# Patient Record
Sex: MALE | Race: BLACK OR AFRICAN AMERICAN | Employment: FULL TIME | URBAN - METROPOLITAN AREA
[De-identification: names, ages, dates, MRNs, and addresses within clinical notes are randomized per-mention and may not be internally consistent; named-entity substitution may affect disease eponyms.]

---

## 2017-01-01 ENCOUNTER — HOSPITAL ENCOUNTER (INPATIENT)
Age: 67
LOS: 9 days | Discharge: HOSPICE | DRG: 308 | End: 2017-07-28
Attending: EMERGENCY MEDICINE | Admitting: HOSPITALIST
Payer: MEDICARE

## 2017-01-01 ENCOUNTER — HOSPICE ADMISSION (OUTPATIENT)
Dept: HOSPICE | Facility: HOSPICE | Age: 67
End: 2017-01-01

## 2017-01-01 ENCOUNTER — APPOINTMENT (OUTPATIENT)
Dept: GENERAL RADIOLOGY | Age: 67
DRG: 308 | End: 2017-01-01
Attending: INTERNAL MEDICINE
Payer: MEDICARE

## 2017-01-01 ENCOUNTER — APPOINTMENT (OUTPATIENT)
Dept: GENERAL RADIOLOGY | Age: 67
DRG: 308 | End: 2017-01-01
Attending: PHYSICIAN ASSISTANT
Payer: MEDICARE

## 2017-01-01 ENCOUNTER — APPOINTMENT (OUTPATIENT)
Dept: GENERAL RADIOLOGY | Age: 67
DRG: 308 | End: 2017-01-01
Attending: EMERGENCY MEDICINE
Payer: MEDICARE

## 2017-01-01 ENCOUNTER — APPOINTMENT (OUTPATIENT)
Dept: CT IMAGING | Age: 67
DRG: 308 | End: 2017-01-01
Attending: NURSE PRACTITIONER
Payer: MEDICARE

## 2017-01-01 ENCOUNTER — HOME CARE VISIT (OUTPATIENT)
Dept: HOSPICE | Facility: HOSPICE | Age: 67
End: 2017-01-01

## 2017-01-01 ENCOUNTER — HOSPITAL ENCOUNTER (INPATIENT)
Age: 67
LOS: 1 days | DRG: 297 | End: 2017-07-28
Attending: INTERNAL MEDICINE | Admitting: INTERNAL MEDICINE
Payer: MEDICARE

## 2017-01-01 VITALS
WEIGHT: 217 LBS | DIASTOLIC BLOOD PRESSURE: 40 MMHG | OXYGEN SATURATION: 84 % | BODY MASS INDEX: 28.76 KG/M2 | HEART RATE: 76 BPM | SYSTOLIC BLOOD PRESSURE: 74 MMHG | TEMPERATURE: 100.2 F | HEIGHT: 73 IN | RESPIRATION RATE: 14 BRPM

## 2017-01-01 DIAGNOSIS — I46.9 CARDIAC ARREST (HCC): Primary | ICD-10-CM

## 2017-01-01 DIAGNOSIS — I47.20 VENTRICULAR TACHYCARDIA: ICD-10-CM

## 2017-01-01 DIAGNOSIS — R73.9 HYPERGLYCEMIA: ICD-10-CM

## 2017-01-01 DIAGNOSIS — N28.9 RENAL INSUFFICIENCY: ICD-10-CM

## 2017-01-01 LAB
ADMINISTERED INITIALS, ADMINIT: NORMAL
ALBUMIN SERPL BCP-MCNC: 1.7 G/DL (ref 3.4–5)
ALBUMIN SERPL BCP-MCNC: 2.1 G/DL (ref 3.4–5)
ALBUMIN SERPL BCP-MCNC: 2.4 G/DL (ref 3.4–5)
ALBUMIN SERPL BCP-MCNC: 2.9 G/DL (ref 3.4–5)
ALBUMIN SERPL BCP-MCNC: 2.9 G/DL (ref 3.4–5)
ALBUMIN SERPL BCP-MCNC: 3 G/DL (ref 3.4–5)
ALBUMIN SERPL BCP-MCNC: 3.1 G/DL (ref 3.4–5)
ALBUMIN SERPL BCP-MCNC: 3.2 G/DL (ref 3.4–5)
ALBUMIN SERPL BCP-MCNC: 3.4 G/DL (ref 3.4–5)
ALBUMIN SERPL BCP-MCNC: 3.5 G/DL (ref 3.4–5)
ALBUMIN SERPL BCP-MCNC: 3.6 G/DL (ref 3.4–5)
ALBUMIN/GLOB SERPL: 0.3 {RATIO} (ref 0.8–1.7)
ALBUMIN/GLOB SERPL: 0.4 {RATIO} (ref 0.8–1.7)
ALBUMIN/GLOB SERPL: 0.5 {RATIO} (ref 0.8–1.7)
ALBUMIN/GLOB SERPL: 0.6 {RATIO} (ref 0.8–1.7)
ALBUMIN/GLOB SERPL: 0.8 {RATIO} (ref 0.8–1.7)
ALBUMIN/GLOB SERPL: 0.9 {RATIO} (ref 0.8–1.7)
ALBUMIN/GLOB SERPL: 0.9 {RATIO} (ref 0.8–1.7)
ALBUMIN/GLOB SERPL: 1 {RATIO} (ref 0.8–1.7)
ALBUMIN/GLOB SERPL: 1 {RATIO} (ref 0.8–1.7)
ALP SERPL-CCNC: 101 U/L (ref 45–117)
ALP SERPL-CCNC: 59 U/L (ref 45–117)
ALP SERPL-CCNC: 63 U/L (ref 45–117)
ALP SERPL-CCNC: 67 U/L (ref 45–117)
ALP SERPL-CCNC: 68 U/L (ref 45–117)
ALP SERPL-CCNC: 69 U/L (ref 45–117)
ALP SERPL-CCNC: 72 U/L (ref 45–117)
ALP SERPL-CCNC: 82 U/L (ref 45–117)
ALP SERPL-CCNC: 83 U/L (ref 45–117)
ALP SERPL-CCNC: 83 U/L (ref 45–117)
ALP SERPL-CCNC: 86 U/L (ref 45–117)
ALP SERPL-CCNC: 91 U/L (ref 45–117)
ALP SERPL-CCNC: 92 U/L (ref 45–117)
ALP SERPL-CCNC: 97 U/L (ref 45–117)
ALT SERPL-CCNC: 132 U/L (ref 16–61)
ALT SERPL-CCNC: 207 U/L (ref 16–61)
ALT SERPL-CCNC: 242 U/L (ref 16–61)
ALT SERPL-CCNC: 316 U/L (ref 16–61)
ALT SERPL-CCNC: 386 U/L (ref 16–61)
ALT SERPL-CCNC: 394 U/L (ref 16–61)
ALT SERPL-CCNC: 401 U/L (ref 16–61)
ALT SERPL-CCNC: 43 U/L (ref 16–61)
ALT SERPL-CCNC: 437 U/L (ref 16–61)
ALT SERPL-CCNC: 54 U/L (ref 16–61)
ALT SERPL-CCNC: 548 U/L (ref 16–61)
ALT SERPL-CCNC: 574 U/L (ref 16–61)
ALT SERPL-CCNC: 65 U/L (ref 16–61)
ALT SERPL-CCNC: 85 U/L (ref 16–61)
AMPHET UR QL SCN: NEGATIVE
ANION GAP BLD CALC-SCNC: 10 MMOL/L (ref 3–18)
ANION GAP BLD CALC-SCNC: 10 MMOL/L (ref 3–18)
ANION GAP BLD CALC-SCNC: 11 MMOL/L (ref 3–18)
ANION GAP BLD CALC-SCNC: 12 MMOL/L (ref 3–18)
ANION GAP BLD CALC-SCNC: 13 MMOL/L (ref 3–18)
ANION GAP BLD CALC-SCNC: 13 MMOL/L (ref 3–18)
ANION GAP BLD CALC-SCNC: 15 MMOL/L (ref 3–18)
ANION GAP BLD CALC-SCNC: 6 MMOL/L (ref 3–18)
ANION GAP BLD CALC-SCNC: 8 MMOL/L (ref 3–18)
ANION GAP BLD CALC-SCNC: 8 MMOL/L (ref 3–18)
ANION GAP BLD CALC-SCNC: 9 MMOL/L (ref 3–18)
ANION GAP BLD CALC-SCNC: 9 MMOL/L (ref 3–18)
APTT PPP: 35.4 SEC (ref 23–36.4)
APTT PPP: 37.6 SEC (ref 23–36.4)
APTT PPP: 42.1 SEC (ref 23–36.4)
APTT PPP: 50.6 SEC (ref 23–36.4)
APTT PPP: 58 SEC (ref 23–36.4)
APTT PPP: 59.5 SEC (ref 23–36.4)
APTT PPP: 71.7 SEC (ref 23–36.4)
APTT PPP: 79 SEC (ref 23–36.4)
ARTERIAL PATENCY WRIST A: ABNORMAL
ARTERIAL PATENCY WRIST A: YES
AST SERPL W P-5'-P-CCNC: 173 U/L (ref 15–37)
AST SERPL W P-5'-P-CCNC: 257 U/L (ref 15–37)
AST SERPL W P-5'-P-CCNC: 26 U/L (ref 15–37)
AST SERPL W P-5'-P-CCNC: 268 U/L (ref 15–37)
AST SERPL W P-5'-P-CCNC: 319 U/L (ref 15–37)
AST SERPL W P-5'-P-CCNC: 32 U/L (ref 15–37)
AST SERPL W P-5'-P-CCNC: 38 U/L (ref 15–37)
AST SERPL W P-5'-P-CCNC: 44 U/L (ref 15–37)
AST SERPL W P-5'-P-CCNC: 440 U/L (ref 15–37)
AST SERPL W P-5'-P-CCNC: 53 U/L (ref 15–37)
AST SERPL W P-5'-P-CCNC: 617 U/L (ref 15–37)
AST SERPL W P-5'-P-CCNC: 686 U/L (ref 15–37)
AST SERPL W P-5'-P-CCNC: 77 U/L (ref 15–37)
AST SERPL W P-5'-P-CCNC: 892 U/L (ref 15–37)
ATRIAL RATE: 127 BPM
ATRIAL RATE: 258 BPM
ATRIAL RATE: 59 BPM
ATRIAL RATE: 59 BPM
ATRIAL RATE: 60 BPM
ATRIAL RATE: 61 BPM
ATRIAL RATE: 63 BPM
ATRIAL RATE: 63 BPM
ATRIAL RATE: 72 BPM
ATRIAL RATE: 79 BPM
BACTERIA SPEC CULT: ABNORMAL
BACTERIA SPEC CULT: NORMAL
BARBITURATES UR QL SCN: NEGATIVE
BASE DEFICIT BLD-SCNC: 10 MMOL/L
BASE DEFICIT BLD-SCNC: 3 MMOL/L
BASE DEFICIT BLD-SCNC: 3 MMOL/L
BASE DEFICIT BLD-SCNC: 4 MMOL/L
BASE DEFICIT BLD-SCNC: 5 MMOL/L
BASE DEFICIT BLD-SCNC: 6 MMOL/L
BASE DEFICIT BLD-SCNC: 6 MMOL/L
BASE DEFICIT BLD-SCNC: 7 MMOL/L
BASE DEFICIT BLD-SCNC: 7 MMOL/L
BASE DEFICIT BLD-SCNC: 8 MMOL/L
BASE DEFICIT BLD-SCNC: 8 MMOL/L
BASE DEFICIT BLD-SCNC: 9 MMOL/L
BASOPHILS # BLD AUTO: 0 K/UL (ref 0–0.06)
BASOPHILS # BLD AUTO: 0 K/UL (ref 0–0.1)
BASOPHILS # BLD AUTO: 0.1 K/UL (ref 0–0.06)
BASOPHILS # BLD AUTO: 0.1 K/UL (ref 0–0.06)
BASOPHILS # BLD: 0 % (ref 0–2)
BASOPHILS # BLD: 0 % (ref 0–3)
BASOPHILS # BLD: 0 % (ref 0–3)
BASOPHILS # BLD: 1 % (ref 0–3)
BASOPHILS # BLD: 1 % (ref 0–3)
BDY SITE: ABNORMAL
BENZODIAZ UR QL: NEGATIVE
BILIRUB SERPL-MCNC: 0.2 MG/DL (ref 0.2–1)
BILIRUB SERPL-MCNC: 0.3 MG/DL (ref 0.2–1)
BILIRUB SERPL-MCNC: 0.4 MG/DL (ref 0.2–1)
BNP SERPL-MCNC: 391 PG/ML (ref 0–900)
BODY TEMPERATURE: 32.9
BODY TEMPERATURE: 33.1
BODY TEMPERATURE: 33.2
BODY TEMPERATURE: 33.2
BODY TEMPERATURE: 37
BODY TEMPERATURE: 38.4
BODY TEMPERATURE: 91.9
BODY TEMPERATURE: 92.1
BODY TEMPERATURE: 98.2
BODY TEMPERATURE: 98.2
BODY TEMPERATURE: 98.6
BODY TEMPERATURE: 98.6
BODY TEMPERATURE: 99.2
BODY TEMPERATURE: 99.6
BODY TEMPERATURE: 99.7
BODY TEMPERATURE: 99.8
BUN SERPL-MCNC: 18 MG/DL (ref 7–18)
BUN SERPL-MCNC: 20 MG/DL (ref 7–18)
BUN SERPL-MCNC: 20 MG/DL (ref 7–18)
BUN SERPL-MCNC: 21 MG/DL (ref 7–18)
BUN SERPL-MCNC: 22 MG/DL (ref 7–18)
BUN SERPL-MCNC: 22 MG/DL (ref 7–18)
BUN SERPL-MCNC: 33 MG/DL (ref 7–18)
BUN SERPL-MCNC: 53 MG/DL (ref 7–18)
BUN SERPL-MCNC: 65 MG/DL (ref 7–18)
BUN SERPL-MCNC: 67 MG/DL (ref 7–18)
BUN SERPL-MCNC: 69 MG/DL (ref 7–18)
BUN SERPL-MCNC: 69 MG/DL (ref 7–18)
BUN/CREAT SERPL: 10 (ref 12–20)
BUN/CREAT SERPL: 12 (ref 12–20)
BUN/CREAT SERPL: 13 (ref 12–20)
BUN/CREAT SERPL: 13 (ref 12–20)
BUN/CREAT SERPL: 14 (ref 12–20)
BUN/CREAT SERPL: 16 (ref 12–20)
BUN/CREAT SERPL: 16 (ref 12–20)
BUN/CREAT SERPL: 18 (ref 12–20)
BUN/CREAT SERPL: 18 (ref 12–20)
BUN/CREAT SERPL: 9 (ref 12–20)
CA-I SERPL-SCNC: 1.06 MMOL/L (ref 1.12–1.32)
CA-I SERPL-SCNC: 1.09 MMOL/L (ref 1.12–1.32)
CA-I SERPL-SCNC: 1.1 MMOL/L (ref 1.12–1.32)
CA-I SERPL-SCNC: 1.13 MMOL/L (ref 1.12–1.32)
CA-I SERPL-SCNC: 1.13 MMOL/L (ref 1.12–1.32)
CA-I SERPL-SCNC: 1.15 MMOL/L (ref 1.12–1.32)
CA-I SERPL-SCNC: 1.17 MMOL/L (ref 1.12–1.32)
CALCIUM SERPL-MCNC: 7.4 MG/DL (ref 8.5–10.1)
CALCIUM SERPL-MCNC: 7.6 MG/DL (ref 8.5–10.1)
CALCIUM SERPL-MCNC: 7.8 MG/DL (ref 8.5–10.1)
CALCIUM SERPL-MCNC: 7.9 MG/DL (ref 8.5–10.1)
CALCIUM SERPL-MCNC: 8.1 MG/DL (ref 8.5–10.1)
CALCIUM SERPL-MCNC: 8.1 MG/DL (ref 8.5–10.1)
CALCIUM SERPL-MCNC: 8.2 MG/DL (ref 8.5–10.1)
CALCIUM SERPL-MCNC: 8.3 MG/DL (ref 8.5–10.1)
CALCIUM SERPL-MCNC: 8.4 MG/DL (ref 8.5–10.1)
CALCIUM SERPL-MCNC: 8.9 MG/DL (ref 8.5–10.1)
CALCULATED P AXIS, ECG09: 66 DEGREES
CALCULATED P AXIS, ECG09: 80 DEGREES
CALCULATED P AXIS, ECG09: 88 DEGREES
CALCULATED P AXIS, ECG09: 94 DEGREES
CALCULATED P AXIS, ECG09: 95 DEGREES
CALCULATED P AXIS, ECG09: 99 DEGREES
CALCULATED R AXIS, ECG10: -74 DEGREES
CALCULATED R AXIS, ECG10: -77 DEGREES
CALCULATED R AXIS, ECG10: -90 DEGREES
CALCULATED R AXIS, ECG10: -94 DEGREES
CALCULATED R AXIS, ECG10: 108 DEGREES
CALCULATED R AXIS, ECG10: 113 DEGREES
CALCULATED R AXIS, ECG10: 116 DEGREES
CALCULATED R AXIS, ECG10: 116 DEGREES
CALCULATED R AXIS, ECG10: 85 DEGREES
CALCULATED R AXIS, ECG10: 92 DEGREES
CALCULATED T AXIS, ECG11: -121 DEGREES
CALCULATED T AXIS, ECG11: -21 DEGREES
CALCULATED T AXIS, ECG11: -61 DEGREES
CALCULATED T AXIS, ECG11: -73 DEGREES
CALCULATED T AXIS, ECG11: -74 DEGREES
CALCULATED T AXIS, ECG11: -89 DEGREES
CALCULATED T AXIS, ECG11: 101 DEGREES
CALCULATED T AXIS, ECG11: 89 DEGREES
CALCULATED T AXIS, ECG11: 93 DEGREES
CALCULATED T AXIS, ECG11: 99 DEGREES
CANNABINOIDS UR QL SCN: NEGATIVE
CHLORIDE SERPL-SCNC: 106 MMOL/L (ref 100–108)
CHLORIDE SERPL-SCNC: 107 MMOL/L (ref 100–108)
CHLORIDE SERPL-SCNC: 108 MMOL/L (ref 100–108)
CHLORIDE SERPL-SCNC: 108 MMOL/L (ref 100–108)
CHLORIDE SERPL-SCNC: 109 MMOL/L (ref 100–108)
CHLORIDE SERPL-SCNC: 110 MMOL/L (ref 100–108)
CHLORIDE SERPL-SCNC: 110 MMOL/L (ref 100–108)
CHLORIDE SERPL-SCNC: 111 MMOL/L (ref 100–108)
CHLORIDE SERPL-SCNC: 113 MMOL/L (ref 100–108)
CHLORIDE SERPL-SCNC: 117 MMOL/L (ref 100–108)
CHLORIDE SERPL-SCNC: 123 MMOL/L (ref 100–108)
CK MB CFR SERPL CALC: 0.9 % (ref 0–4)
CK MB CFR SERPL CALC: 5.8 % (ref 0–4)
CK MB CFR SERPL CALC: 5.9 % (ref 0–4)
CK MB CFR SERPL CALC: 6 % (ref 0–4)
CK MB SERPL-MCNC: 2.5 NG/ML (ref 5–25)
CK MB SERPL-MCNC: 29.5 NG/ML (ref 5–25)
CK MB SERPL-MCNC: 39.5 NG/ML (ref 5–25)
CK MB SERPL-MCNC: 41.2 NG/ML (ref 5–25)
CK SERPL-CCNC: 279 U/L (ref 39–308)
CK SERPL-CCNC: 509 U/L (ref 39–308)
CK SERPL-CCNC: 674 U/L (ref 39–308)
CK SERPL-CCNC: 683 U/L (ref 39–308)
CO2 SERPL-SCNC: 17 MMOL/L (ref 21–32)
CO2 SERPL-SCNC: 17 MMOL/L (ref 21–32)
CO2 SERPL-SCNC: 20 MMOL/L (ref 21–32)
CO2 SERPL-SCNC: 20 MMOL/L (ref 21–32)
CO2 SERPL-SCNC: 21 MMOL/L (ref 21–32)
CO2 SERPL-SCNC: 22 MMOL/L (ref 21–32)
COCAINE UR QL SCN: NEGATIVE
CREAT SERPL-MCNC: 1.39 MG/DL (ref 0.6–1.3)
CREAT SERPL-MCNC: 1.41 MG/DL (ref 0.6–1.3)
CREAT SERPL-MCNC: 1.63 MG/DL (ref 0.6–1.3)
CREAT SERPL-MCNC: 1.76 MG/DL (ref 0.6–1.3)
CREAT SERPL-MCNC: 2.11 MG/DL (ref 0.6–1.3)
CREAT SERPL-MCNC: 2.14 MG/DL (ref 0.6–1.3)
CREAT SERPL-MCNC: 2.15 MG/DL (ref 0.6–1.3)
CREAT SERPL-MCNC: 2.36 MG/DL (ref 0.6–1.3)
CREAT SERPL-MCNC: 2.73 MG/DL (ref 0.6–1.3)
CREAT SERPL-MCNC: 3.77 MG/DL (ref 0.6–1.3)
CREAT SERPL-MCNC: 3.85 MG/DL (ref 0.6–1.3)
CREAT SERPL-MCNC: 4.13 MG/DL (ref 0.6–1.3)
CREAT SERPL-MCNC: 4.14 MG/DL (ref 0.6–1.3)
CREAT SERPL-MCNC: 4.26 MG/DL (ref 0.6–1.3)
D DIMER PPP FEU-MCNC: 0.98 UG/ML(FEU)
D50 ADMINISTERED, D50ADM: 0 ML
D50 ADMINISTERED, D50ADM: 16 ML
D50 ORDER, D50ORD: 0 ML
D50 ORDER, D50ORD: 16 ML
DIAGNOSIS, 93000: NORMAL
DIFFERENTIAL METHOD BLD: ABNORMAL
EOSINOPHIL # BLD: 0 K/UL (ref 0–0.4)
EOSINOPHIL # BLD: 0.1 K/UL (ref 0–0.4)
EOSINOPHIL # BLD: 0.2 K/UL (ref 0–0.4)
EOSINOPHIL # BLD: 0.3 K/UL (ref 0–0.4)
EOSINOPHIL # BLD: 0.3 K/UL (ref 0–0.4)
EOSINOPHIL NFR BLD: 0 % (ref 0–5)
EOSINOPHIL NFR BLD: 1 % (ref 0–5)
EOSINOPHIL NFR BLD: 2 % (ref 0–5)
EOSINOPHIL NFR BLD: 3 % (ref 0–5)
EOSINOPHIL NFR BLD: 3 % (ref 0–5)
ERYTHROCYTE [DISTWIDTH] IN BLOOD BY AUTOMATED COUNT: 13.7 % (ref 11.6–14.5)
ERYTHROCYTE [DISTWIDTH] IN BLOOD BY AUTOMATED COUNT: 13.8 % (ref 11.6–14.5)
ERYTHROCYTE [DISTWIDTH] IN BLOOD BY AUTOMATED COUNT: 14.1 % (ref 11.6–14.5)
ERYTHROCYTE [DISTWIDTH] IN BLOOD BY AUTOMATED COUNT: 14.2 % (ref 11.6–14.5)
ERYTHROCYTE [DISTWIDTH] IN BLOOD BY AUTOMATED COUNT: 14.4 % (ref 11.6–14.5)
ERYTHROCYTE [DISTWIDTH] IN BLOOD BY AUTOMATED COUNT: 14.5 % (ref 11.6–14.5)
ERYTHROCYTE [DISTWIDTH] IN BLOOD BY AUTOMATED COUNT: 14.7 % (ref 11.6–14.5)
ERYTHROCYTE [DISTWIDTH] IN BLOOD BY AUTOMATED COUNT: 14.8 % (ref 11.6–14.5)
ERYTHROCYTE [DISTWIDTH] IN BLOOD BY AUTOMATED COUNT: 14.9 % (ref 11.6–14.5)
ERYTHROCYTE [DISTWIDTH] IN BLOOD BY AUTOMATED COUNT: 14.9 % (ref 11.6–14.5)
ERYTHROCYTE [DISTWIDTH] IN BLOOD BY AUTOMATED COUNT: 15.2 % (ref 11.6–14.5)
EST. AVERAGE GLUCOSE BLD GHB EST-MCNC: 177 MG/DL
GAS FLOW.O2 O2 DELIVERY SYS: ABNORMAL L/MIN
GAS FLOW.O2 SETTING OXYMISER: 14 BPM
GAS FLOW.O2 SETTING OXYMISER: 16 BPM
GAS FLOW.O2 SETTING OXYMISER: 18 BPM
GLOBULIN SER CALC-MCNC: 3.1 G/DL (ref 2–4)
GLOBULIN SER CALC-MCNC: 3.4 G/DL (ref 2–4)
GLOBULIN SER CALC-MCNC: 3.6 G/DL (ref 2–4)
GLOBULIN SER CALC-MCNC: 3.7 G/DL (ref 2–4)
GLOBULIN SER CALC-MCNC: 3.9 G/DL (ref 2–4)
GLOBULIN SER CALC-MCNC: 4 G/DL (ref 2–4)
GLOBULIN SER CALC-MCNC: 4 G/DL (ref 2–4)
GLOBULIN SER CALC-MCNC: 4.1 G/DL (ref 2–4)
GLOBULIN SER CALC-MCNC: 4.2 G/DL (ref 2–4)
GLOBULIN SER CALC-MCNC: 4.5 G/DL (ref 2–4)
GLOBULIN SER CALC-MCNC: 4.5 G/DL (ref 2–4)
GLOBULIN SER CALC-MCNC: 4.9 G/DL (ref 2–4)
GLUCOSE BLD STRIP.AUTO-MCNC: 101 MG/DL (ref 70–110)
GLUCOSE BLD STRIP.AUTO-MCNC: 104 MG/DL (ref 70–110)
GLUCOSE BLD STRIP.AUTO-MCNC: 108 MG/DL (ref 70–110)
GLUCOSE BLD STRIP.AUTO-MCNC: 108 MG/DL (ref 70–110)
GLUCOSE BLD STRIP.AUTO-MCNC: 111 MG/DL (ref 70–110)
GLUCOSE BLD STRIP.AUTO-MCNC: 111 MG/DL (ref 70–110)
GLUCOSE BLD STRIP.AUTO-MCNC: 112 MG/DL (ref 70–110)
GLUCOSE BLD STRIP.AUTO-MCNC: 116 MG/DL (ref 70–110)
GLUCOSE BLD STRIP.AUTO-MCNC: 119 MG/DL (ref 70–110)
GLUCOSE BLD STRIP.AUTO-MCNC: 119 MG/DL (ref 70–110)
GLUCOSE BLD STRIP.AUTO-MCNC: 120 MG/DL (ref 70–110)
GLUCOSE BLD STRIP.AUTO-MCNC: 121 MG/DL (ref 70–110)
GLUCOSE BLD STRIP.AUTO-MCNC: 130 MG/DL (ref 70–110)
GLUCOSE BLD STRIP.AUTO-MCNC: 130 MG/DL (ref 70–110)
GLUCOSE BLD STRIP.AUTO-MCNC: 135 MG/DL (ref 70–110)
GLUCOSE BLD STRIP.AUTO-MCNC: 137 MG/DL (ref 70–110)
GLUCOSE BLD STRIP.AUTO-MCNC: 139 MG/DL (ref 70–110)
GLUCOSE BLD STRIP.AUTO-MCNC: 142 MG/DL (ref 70–110)
GLUCOSE BLD STRIP.AUTO-MCNC: 145 MG/DL (ref 70–110)
GLUCOSE BLD STRIP.AUTO-MCNC: 148 MG/DL (ref 70–110)
GLUCOSE BLD STRIP.AUTO-MCNC: 150 MG/DL (ref 70–110)
GLUCOSE BLD STRIP.AUTO-MCNC: 152 MG/DL (ref 70–110)
GLUCOSE BLD STRIP.AUTO-MCNC: 157 MG/DL (ref 70–110)
GLUCOSE BLD STRIP.AUTO-MCNC: 159 MG/DL (ref 70–110)
GLUCOSE BLD STRIP.AUTO-MCNC: 160 MG/DL (ref 70–110)
GLUCOSE BLD STRIP.AUTO-MCNC: 160 MG/DL (ref 70–110)
GLUCOSE BLD STRIP.AUTO-MCNC: 161 MG/DL (ref 70–110)
GLUCOSE BLD STRIP.AUTO-MCNC: 161 MG/DL (ref 70–110)
GLUCOSE BLD STRIP.AUTO-MCNC: 168 MG/DL (ref 70–110)
GLUCOSE BLD STRIP.AUTO-MCNC: 169 MG/DL (ref 70–110)
GLUCOSE BLD STRIP.AUTO-MCNC: 170 MG/DL (ref 70–110)
GLUCOSE BLD STRIP.AUTO-MCNC: 176 MG/DL (ref 70–110)
GLUCOSE BLD STRIP.AUTO-MCNC: 177 MG/DL (ref 70–110)
GLUCOSE BLD STRIP.AUTO-MCNC: 180 MG/DL (ref 70–110)
GLUCOSE BLD STRIP.AUTO-MCNC: 181 MG/DL (ref 70–110)
GLUCOSE BLD STRIP.AUTO-MCNC: 184 MG/DL (ref 70–110)
GLUCOSE BLD STRIP.AUTO-MCNC: 185 MG/DL (ref 70–110)
GLUCOSE BLD STRIP.AUTO-MCNC: 185 MG/DL (ref 70–110)
GLUCOSE BLD STRIP.AUTO-MCNC: 190 MG/DL (ref 70–110)
GLUCOSE BLD STRIP.AUTO-MCNC: 190 MG/DL (ref 70–110)
GLUCOSE BLD STRIP.AUTO-MCNC: 192 MG/DL (ref 70–110)
GLUCOSE BLD STRIP.AUTO-MCNC: 194 MG/DL (ref 70–110)
GLUCOSE BLD STRIP.AUTO-MCNC: 194 MG/DL (ref 70–110)
GLUCOSE BLD STRIP.AUTO-MCNC: 196 MG/DL (ref 70–110)
GLUCOSE BLD STRIP.AUTO-MCNC: 202 MG/DL (ref 70–110)
GLUCOSE BLD STRIP.AUTO-MCNC: 202 MG/DL (ref 70–110)
GLUCOSE BLD STRIP.AUTO-MCNC: 203 MG/DL (ref 70–110)
GLUCOSE BLD STRIP.AUTO-MCNC: 209 MG/DL (ref 70–110)
GLUCOSE BLD STRIP.AUTO-MCNC: 212 MG/DL (ref 70–110)
GLUCOSE BLD STRIP.AUTO-MCNC: 219 MG/DL (ref 70–110)
GLUCOSE BLD STRIP.AUTO-MCNC: 220 MG/DL (ref 70–110)
GLUCOSE BLD STRIP.AUTO-MCNC: 224 MG/DL (ref 70–110)
GLUCOSE BLD STRIP.AUTO-MCNC: 224 MG/DL (ref 70–110)
GLUCOSE BLD STRIP.AUTO-MCNC: 231 MG/DL (ref 70–110)
GLUCOSE BLD STRIP.AUTO-MCNC: 241 MG/DL (ref 70–110)
GLUCOSE BLD STRIP.AUTO-MCNC: 242 MG/DL (ref 70–110)
GLUCOSE BLD STRIP.AUTO-MCNC: 263 MG/DL (ref 70–110)
GLUCOSE BLD STRIP.AUTO-MCNC: 266 MG/DL (ref 70–110)
GLUCOSE BLD STRIP.AUTO-MCNC: 278 MG/DL (ref 70–110)
GLUCOSE BLD STRIP.AUTO-MCNC: 281 MG/DL (ref 70–110)
GLUCOSE BLD STRIP.AUTO-MCNC: 286 MG/DL (ref 70–110)
GLUCOSE BLD STRIP.AUTO-MCNC: 322 MG/DL (ref 70–110)
GLUCOSE BLD STRIP.AUTO-MCNC: 323 MG/DL (ref 70–110)
GLUCOSE BLD STRIP.AUTO-MCNC: 338 MG/DL (ref 70–110)
GLUCOSE BLD STRIP.AUTO-MCNC: 43 MG/DL (ref 70–110)
GLUCOSE BLD STRIP.AUTO-MCNC: 59 MG/DL (ref 70–110)
GLUCOSE BLD STRIP.AUTO-MCNC: 86 MG/DL (ref 70–110)
GLUCOSE SERPL-MCNC: 110 MG/DL (ref 74–99)
GLUCOSE SERPL-MCNC: 127 MG/DL (ref 74–99)
GLUCOSE SERPL-MCNC: 146 MG/DL (ref 74–99)
GLUCOSE SERPL-MCNC: 180 MG/DL (ref 74–99)
GLUCOSE SERPL-MCNC: 193 MG/DL (ref 74–99)
GLUCOSE SERPL-MCNC: 196 MG/DL (ref 74–99)
GLUCOSE SERPL-MCNC: 204 MG/DL (ref 74–99)
GLUCOSE SERPL-MCNC: 206 MG/DL (ref 74–99)
GLUCOSE SERPL-MCNC: 213 MG/DL (ref 74–99)
GLUCOSE SERPL-MCNC: 220 MG/DL (ref 74–99)
GLUCOSE SERPL-MCNC: 244 MG/DL (ref 74–99)
GLUCOSE SERPL-MCNC: 255 MG/DL (ref 74–99)
GLUCOSE SERPL-MCNC: 258 MG/DL (ref 74–99)
GLUCOSE SERPL-MCNC: 368 MG/DL (ref 74–99)
GLUCOSE, GLC: 101 MG/DL
GLUCOSE, GLC: 104 MG/DL
GLUCOSE, GLC: 108 MG/DL
GLUCOSE, GLC: 111 MG/DL
GLUCOSE, GLC: 111 MG/DL
GLUCOSE, GLC: 112 MG/DL
GLUCOSE, GLC: 116 MG/DL
GLUCOSE, GLC: 119 MG/DL
GLUCOSE, GLC: 120 MG/DL
GLUCOSE, GLC: 121 MG/DL
GLUCOSE, GLC: 130 MG/DL
GLUCOSE, GLC: 130 MG/DL
GLUCOSE, GLC: 135 MG/DL
GLUCOSE, GLC: 139 MG/DL
GLUCOSE, GLC: 145 MG/DL
GLUCOSE, GLC: 148 MG/DL
GLUCOSE, GLC: 152 MG/DL
GLUCOSE, GLC: 157 MG/DL
GLUCOSE, GLC: 159 MG/DL
GLUCOSE, GLC: 160 MG/DL
GLUCOSE, GLC: 160 MG/DL
GLUCOSE, GLC: 161 MG/DL
GLUCOSE, GLC: 161 MG/DL
GLUCOSE, GLC: 168 MG/DL
GLUCOSE, GLC: 169 MG/DL
GLUCOSE, GLC: 170 MG/DL
GLUCOSE, GLC: 176 MG/DL
GLUCOSE, GLC: 180 MG/DL
GLUCOSE, GLC: 185 MG/DL
GLUCOSE, GLC: 190 MG/DL
GLUCOSE, GLC: 190 MG/DL
GLUCOSE, GLC: 192 MG/DL
GLUCOSE, GLC: 194 MG/DL
GLUCOSE, GLC: 202 MG/DL
GLUCOSE, GLC: 212 MG/DL
GLUCOSE, GLC: 224 MG/DL
GLUCOSE, GLC: 278 MG/DL
GLUCOSE, GLC: 286 MG/DL
GLUCOSE, GLC: 322 MG/DL
GLUCOSE, GLC: 338 MG/DL
GLUCOSE, GLC: 59 MG/DL
GLUCOSE, GLC: 86 MG/DL
GRAM STN SPEC: ABNORMAL
HBA1C MFR BLD: 7.8 % (ref 4.2–5.6)
HCO3 BLD-SCNC: 16.9 MMOL/L (ref 22–26)
HCO3 BLD-SCNC: 17.3 MMOL/L (ref 22–26)
HCO3 BLD-SCNC: 17.4 MMOL/L (ref 22–26)
HCO3 BLD-SCNC: 17.8 MMOL/L (ref 22–26)
HCO3 BLD-SCNC: 18.2 MMOL/L (ref 22–26)
HCO3 BLD-SCNC: 18.2 MMOL/L (ref 22–26)
HCO3 BLD-SCNC: 18.7 MMOL/L (ref 22–26)
HCO3 BLD-SCNC: 19.1 MMOL/L (ref 22–26)
HCO3 BLD-SCNC: 19.5 MMOL/L (ref 22–26)
HCO3 BLD-SCNC: 19.6 MMOL/L (ref 22–26)
HCO3 BLD-SCNC: 19.9 MMOL/L (ref 22–26)
HCO3 BLD-SCNC: 20 MMOL/L (ref 22–26)
HCO3 BLD-SCNC: 20.1 MMOL/L (ref 22–26)
HCO3 BLD-SCNC: 20.1 MMOL/L (ref 22–26)
HCO3 BLD-SCNC: 20.6 MMOL/L (ref 22–26)
HCO3 BLD-SCNC: 20.9 MMOL/L (ref 22–26)
HCO3 BLD-SCNC: 21.3 MMOL/L (ref 22–26)
HCO3 BLD-SCNC: 21.3 MMOL/L (ref 22–26)
HCO3 BLD-SCNC: 21.7 MMOL/L (ref 22–26)
HCO3 BLD-SCNC: 22.8 MMOL/L (ref 22–26)
HCT VFR BLD AUTO: 22.1 % (ref 36–48)
HCT VFR BLD AUTO: 22.8 % (ref 36–48)
HCT VFR BLD AUTO: 23.1 % (ref 36–48)
HCT VFR BLD AUTO: 24.8 % (ref 36–48)
HCT VFR BLD AUTO: 26.1 % (ref 36–48)
HCT VFR BLD AUTO: 29.7 % (ref 36–48)
HCT VFR BLD AUTO: 31.5 % (ref 36–48)
HCT VFR BLD AUTO: 31.9 % (ref 36–48)
HCT VFR BLD AUTO: 32.6 % (ref 36–48)
HCT VFR BLD AUTO: 34 % (ref 36–48)
HCT VFR BLD AUTO: 34.4 % (ref 36–48)
HCT VFR BLD AUTO: 35.8 % (ref 36–48)
HCT VFR BLD AUTO: 36 % (ref 36–48)
HCT VFR BLD AUTO: 37.3 % (ref 36–48)
HDSCOM,HDSCOM: NORMAL
HGB BLD-MCNC: 10.5 G/DL (ref 13–16)
HGB BLD-MCNC: 10.6 G/DL (ref 13–16)
HGB BLD-MCNC: 10.6 G/DL (ref 13–16)
HGB BLD-MCNC: 11.3 G/DL (ref 13–16)
HGB BLD-MCNC: 11.6 G/DL (ref 13–16)
HGB BLD-MCNC: 11.6 G/DL (ref 13–16)
HGB BLD-MCNC: 11.8 G/DL (ref 13–16)
HGB BLD-MCNC: 12 G/DL (ref 13–16)
HGB BLD-MCNC: 7.2 G/DL (ref 13–16)
HGB BLD-MCNC: 7.3 G/DL (ref 13–16)
HGB BLD-MCNC: 7.6 G/DL (ref 13–16)
HGB BLD-MCNC: 8 G/DL (ref 13–16)
HGB BLD-MCNC: 8.7 G/DL (ref 13–16)
HGB BLD-MCNC: 9.6 G/DL (ref 13–16)
HIGH TARGET, HITG: 180 MG/DL
INR PPP: 1.5 (ref 0.8–1.2)
INR PPP: 1.7 (ref 0.8–1.2)
INR PPP: 2 (ref 0.8–1.2)
INR PPP: 2 (ref 0.8–1.2)
INR PPP: 2.7 (ref 0.8–1.2)
INR PPP: 2.8 (ref 0.8–1.2)
INR PPP: 3.3 (ref 0.8–1.2)
INR PPP: 3.7 (ref 0.8–1.2)
INR PPP: 4 (ref 0.8–1.2)
INR PPP: 4.5 (ref 0.8–1.2)
INR PPP: 5 (ref 0.8–1.2)
INR PPP: 5.6 (ref 0.8–1.2)
INSPIRATION.DURATION SETTING TIME VENT: 0.6 SEC
INSPIRATION.DURATION SETTING TIME VENT: 0.8 SEC
INSPIRATION.DURATION SETTING TIME VENT: 0.85 SEC
INSPIRATION.DURATION SETTING TIME VENT: 0.9 SEC
INSPIRATION.DURATION SETTING TIME VENT: 1 SEC
INSPIRATION.DURATION SETTING TIME VENT: 1.25 SEC
INSPIRATION.DURATION SETTING TIME VENT: 1.25 SEC
INSPIRATION.DURATION SETTING TIME VENT: 1.5 SEC
INSPIRATION.DURATION SETTING TIME VENT: 1.5 SEC
INSULIN ADMINSTERED, INSADM: 0 UNITS/HOUR
INSULIN ADMINSTERED, INSADM: 0.2 UNITS/HOUR
INSULIN ADMINSTERED, INSADM: 0.7 UNITS/HOUR
INSULIN ADMINSTERED, INSADM: 0.8 UNITS/HOUR
INSULIN ADMINSTERED, INSADM: 0.9 UNITS/HOUR
INSULIN ADMINSTERED, INSADM: 1 UNITS/HOUR
INSULIN ADMINSTERED, INSADM: 1.1 UNITS/HOUR
INSULIN ADMINSTERED, INSADM: 1.2 UNITS/HOUR
INSULIN ADMINSTERED, INSADM: 1.3 UNITS/HOUR
INSULIN ADMINSTERED, INSADM: 1.3 UNITS/HOUR
INSULIN ADMINSTERED, INSADM: 1.5 UNITS/HOUR
INSULIN ADMINSTERED, INSADM: 1.8 UNITS/HOUR
INSULIN ADMINSTERED, INSADM: 11.5 UNITS/HOUR
INSULIN ADMINSTERED, INSADM: 13.6 UNITS/HOUR
INSULIN ADMINSTERED, INSADM: 2.6 UNITS/HOUR
INSULIN ADMINSTERED, INSADM: 2.6 UNITS/HOUR
INSULIN ADMINSTERED, INSADM: 3 UNITS/HOUR
INSULIN ADMINSTERED, INSADM: 3 UNITS/HOUR
INSULIN ADMINSTERED, INSADM: 3.2 UNITS/HOUR
INSULIN ADMINSTERED, INSADM: 3.3 UNITS/HOUR
INSULIN ADMINSTERED, INSADM: 3.4 UNITS/HOUR
INSULIN ADMINSTERED, INSADM: 4.3 UNITS/HOUR
INSULIN ADMINSTERED, INSADM: 5.2 UNITS/HOUR
INSULIN ADMINSTERED, INSADM: 7.6 UNITS/HOUR
INSULIN ADMINSTERED, INSADM: 8.1 UNITS/HOUR
INSULIN ADMINSTERED, INSADM: 8.3 UNITS/HOUR
INSULIN ADMINSTERED, INSADM: 8.7 UNITS/HOUR
INSULIN ORDER, INSORD: 0 UNITS/HOUR
INSULIN ORDER, INSORD: 0.2 UNITS/HOUR
INSULIN ORDER, INSORD: 0.7 UNITS/HOUR
INSULIN ORDER, INSORD: 0.8 UNITS/HOUR
INSULIN ORDER, INSORD: 0.9 UNITS/HOUR
INSULIN ORDER, INSORD: 1 UNITS/HOUR
INSULIN ORDER, INSORD: 1.1 UNITS/HOUR
INSULIN ORDER, INSORD: 1.2 UNITS/HOUR
INSULIN ORDER, INSORD: 1.3 UNITS/HOUR
INSULIN ORDER, INSORD: 1.3 UNITS/HOUR
INSULIN ORDER, INSORD: 1.5 UNITS/HOUR
INSULIN ORDER, INSORD: 1.8 UNITS/HOUR
INSULIN ORDER, INSORD: 11.5 UNITS/HOUR
INSULIN ORDER, INSORD: 13.6 UNITS/HOUR
INSULIN ORDER, INSORD: 2.6 UNITS/HOUR
INSULIN ORDER, INSORD: 2.6 UNITS/HOUR
INSULIN ORDER, INSORD: 3 UNITS/HOUR
INSULIN ORDER, INSORD: 3 UNITS/HOUR
INSULIN ORDER, INSORD: 3.2 UNITS/HOUR
INSULIN ORDER, INSORD: 3.3 UNITS/HOUR
INSULIN ORDER, INSORD: 3.4 UNITS/HOUR
INSULIN ORDER, INSORD: 4.3 UNITS/HOUR
INSULIN ORDER, INSORD: 5.2 UNITS/HOUR
INSULIN ORDER, INSORD: 7.6 UNITS/HOUR
INSULIN ORDER, INSORD: 8.1 UNITS/HOUR
INSULIN ORDER, INSORD: 8.3 UNITS/HOUR
INSULIN ORDER, INSORD: 8.7 UNITS/HOUR
LACTATE SERPL-SCNC: 1 MMOL/L (ref 0.4–2)
LACTATE SERPL-SCNC: 1 MMOL/L (ref 0.4–2)
LACTATE SERPL-SCNC: 1.1 MMOL/L (ref 0.4–2)
LACTATE SERPL-SCNC: 1.2 MMOL/L (ref 0.4–2)
LACTATE SERPL-SCNC: 1.9 MMOL/L (ref 0.4–2)
LACTATE SERPL-SCNC: 2.3 MMOL/L (ref 0.4–2)
LACTATE SERPL-SCNC: 4.1 MMOL/L (ref 0.4–2)
LOW TARGET, LOT: 140 MG/DL
LYMPHOCYTES # BLD AUTO: 10 % (ref 21–52)
LYMPHOCYTES # BLD AUTO: 2 % (ref 20–51)
LYMPHOCYTES # BLD AUTO: 3 % (ref 21–52)
LYMPHOCYTES # BLD AUTO: 3 % (ref 21–52)
LYMPHOCYTES # BLD AUTO: 32 % (ref 20–51)
LYMPHOCYTES # BLD AUTO: 4 % (ref 21–52)
LYMPHOCYTES # BLD AUTO: 4 % (ref 21–52)
LYMPHOCYTES # BLD AUTO: 5 % (ref 20–51)
LYMPHOCYTES # BLD AUTO: 6 % (ref 21–52)
LYMPHOCYTES # BLD AUTO: 6 % (ref 21–52)
LYMPHOCYTES # BLD AUTO: 8 % (ref 21–52)
LYMPHOCYTES # BLD AUTO: 9 % (ref 20–51)
LYMPHOCYTES # BLD AUTO: 9 % (ref 21–52)
LYMPHOCYTES # BLD AUTO: 9 % (ref 21–52)
LYMPHOCYTES # BLD: 0.2 K/UL (ref 0.8–3.5)
LYMPHOCYTES # BLD: 0.4 K/UL (ref 0.8–3.5)
LYMPHOCYTES # BLD: 0.4 K/UL (ref 0.9–3.6)
LYMPHOCYTES # BLD: 0.5 K/UL (ref 0.9–3.6)
LYMPHOCYTES # BLD: 0.5 K/UL (ref 0.9–3.6)
LYMPHOCYTES # BLD: 0.6 K/UL (ref 0.9–3.6)
LYMPHOCYTES # BLD: 0.7 K/UL (ref 0.9–3.6)
LYMPHOCYTES # BLD: 0.8 K/UL (ref 0.8–3.5)
LYMPHOCYTES # BLD: 0.9 K/UL (ref 0.9–3.6)
LYMPHOCYTES # BLD: 1 K/UL (ref 0.9–3.6)
LYMPHOCYTES # BLD: 1.1 K/UL (ref 0.9–3.6)
LYMPHOCYTES # BLD: 3.1 K/UL (ref 0.8–3.5)
MAGNESIUM SERPL-MCNC: 2.1 MG/DL (ref 1.6–2.6)
MAGNESIUM SERPL-MCNC: 2.2 MG/DL (ref 1.6–2.6)
MAGNESIUM SERPL-MCNC: 2.3 MG/DL (ref 1.6–2.6)
MAGNESIUM SERPL-MCNC: 2.4 MG/DL (ref 1.6–2.6)
MAGNESIUM SERPL-MCNC: 2.4 MG/DL (ref 1.6–2.6)
MAGNESIUM SERPL-MCNC: 2.6 MG/DL (ref 1.6–2.6)
MAGNESIUM SERPL-MCNC: 2.7 MG/DL (ref 1.6–2.6)
MAGNESIUM SERPL-MCNC: 2.8 MG/DL (ref 1.6–2.6)
MAGNESIUM SERPL-MCNC: 2.8 MG/DL (ref 1.6–2.6)
MCH RBC QN AUTO: 26.4 PG (ref 24–34)
MCH RBC QN AUTO: 26.7 PG (ref 24–34)
MCH RBC QN AUTO: 26.8 PG (ref 24–34)
MCH RBC QN AUTO: 26.9 PG (ref 24–34)
MCH RBC QN AUTO: 27 PG (ref 24–34)
MCH RBC QN AUTO: 27.2 PG (ref 24–34)
MCH RBC QN AUTO: 27.3 PG (ref 24–34)
MCH RBC QN AUTO: 27.4 PG (ref 24–34)
MCHC RBC AUTO-ENTMCNC: 31.2 G/DL (ref 31–37)
MCHC RBC AUTO-ENTMCNC: 31.6 G/DL (ref 31–37)
MCHC RBC AUTO-ENTMCNC: 32.3 G/DL (ref 31–37)
MCHC RBC AUTO-ENTMCNC: 32.3 G/DL (ref 31–37)
MCHC RBC AUTO-ENTMCNC: 32.4 G/DL (ref 31–37)
MCHC RBC AUTO-ENTMCNC: 32.5 G/DL (ref 31–37)
MCHC RBC AUTO-ENTMCNC: 33 G/DL (ref 31–37)
MCHC RBC AUTO-ENTMCNC: 33.2 G/DL (ref 31–37)
MCHC RBC AUTO-ENTMCNC: 33.2 G/DL (ref 31–37)
MCHC RBC AUTO-ENTMCNC: 33.3 G/DL (ref 31–37)
MCHC RBC AUTO-ENTMCNC: 33.7 G/DL (ref 31–37)
MCV RBC AUTO: 81 FL (ref 74–97)
MCV RBC AUTO: 81.1 FL (ref 74–97)
MCV RBC AUTO: 81.3 FL (ref 74–97)
MCV RBC AUTO: 81.4 FL (ref 74–97)
MCV RBC AUTO: 81.8 FL (ref 74–97)
MCV RBC AUTO: 82 FL (ref 74–97)
MCV RBC AUTO: 82.2 FL (ref 74–97)
MCV RBC AUTO: 82.3 FL (ref 74–97)
MCV RBC AUTO: 82.5 FL (ref 74–97)
MCV RBC AUTO: 83.1 FL (ref 74–97)
MCV RBC AUTO: 83.4 FL (ref 74–97)
MCV RBC AUTO: 84.1 FL (ref 74–97)
MCV RBC AUTO: 85.2 FL (ref 74–97)
MCV RBC AUTO: 85.6 FL (ref 74–97)
METHADONE UR QL: NEGATIVE
MINUTES UNTIL NEXT BG, NBG: 15 MIN
MINUTES UNTIL NEXT BG, NBG: 60 MIN
MONOCYTES # BLD: 0.3 K/UL (ref 0–1)
MONOCYTES # BLD: 0.5 K/UL (ref 0–1)
MONOCYTES # BLD: 0.6 K/UL (ref 0.05–1.2)
MONOCYTES # BLD: 0.7 K/UL (ref 0.05–1.2)
MONOCYTES # BLD: 0.8 K/UL (ref 0.05–1.2)
MONOCYTES # BLD: 0.8 K/UL (ref 0.05–1.2)
MONOCYTES # BLD: 0.8 K/UL (ref 0–1)
MONOCYTES # BLD: 1 K/UL (ref 0.05–1.2)
MONOCYTES # BLD: 1 K/UL (ref 0.05–1.2)
MONOCYTES # BLD: 1.1 K/UL (ref 0.05–1.2)
MONOCYTES # BLD: 1.3 K/UL (ref 0.05–1.2)
MONOCYTES # BLD: 1.3 K/UL (ref 0–1)
MONOCYTES NFR BLD AUTO: 10 % (ref 3–10)
MONOCYTES NFR BLD AUTO: 12 % (ref 3–10)
MONOCYTES NFR BLD AUTO: 12 % (ref 3–10)
MONOCYTES NFR BLD AUTO: 14 % (ref 2–9)
MONOCYTES NFR BLD AUTO: 3 % (ref 2–9)
MONOCYTES NFR BLD AUTO: 4 % (ref 3–10)
MONOCYTES NFR BLD AUTO: 4 % (ref 3–10)
MONOCYTES NFR BLD AUTO: 5 % (ref 3–10)
MONOCYTES NFR BLD AUTO: 6 % (ref 3–10)
MONOCYTES NFR BLD AUTO: 7 % (ref 2–9)
MONOCYTES NFR BLD AUTO: 7 % (ref 3–10)
MONOCYTES NFR BLD AUTO: 7 % (ref 3–10)
MONOCYTES NFR BLD AUTO: 8 % (ref 3–10)
MONOCYTES NFR BLD AUTO: 9 % (ref 2–9)
MULTIPLIER, MUL: 0
MULTIPLIER, MUL: 0.01
MULTIPLIER, MUL: 0.02
MULTIPLIER, MUL: 0.03
MULTIPLIER, MUL: 0.04
MULTIPLIER, MUL: 0.05
MULTIPLIER, MUL: 0.06
MULTIPLIER, MUL: 0.06
MULTIPLIER, MUL: 0.07
MULTIPLIER, MUL: 0.07
NEUTS BAND NFR BLD MANUAL: 1 % (ref 0–5)
NEUTS BAND NFR BLD MANUAL: 14 % (ref 0–5)
NEUTS BAND NFR BLD MANUAL: 15 % (ref 0–5)
NEUTS BAND NFR BLD MANUAL: 49 % (ref 0–5)
NEUTS SEG # BLD: 10.6 K/UL (ref 1.8–8)
NEUTS SEG # BLD: 11 K/UL (ref 1.8–8)
NEUTS SEG # BLD: 12.7 K/UL (ref 1.8–8)
NEUTS SEG # BLD: 13.2 K/UL (ref 1.8–8)
NEUTS SEG # BLD: 13.2 K/UL (ref 1.8–8)
NEUTS SEG # BLD: 14 K/UL (ref 1.8–8)
NEUTS SEG # BLD: 14.5 K/UL (ref 1.8–8)
NEUTS SEG # BLD: 2.8 K/UL (ref 1.8–8)
NEUTS SEG # BLD: 4.8 K/UL (ref 1.8–8)
NEUTS SEG # BLD: 6.4 K/UL (ref 1.8–8)
NEUTS SEG # BLD: 6.4 K/UL (ref 1.8–8)
NEUTS SEG # BLD: 7.4 K/UL (ref 1.8–8)
NEUTS SEG # BLD: 7.8 K/UL (ref 1.8–8)
NEUTS SEG # BLD: 8.4 K/UL (ref 1.8–8)
NEUTS SEG NFR BLD AUTO: 33 % (ref 42–75)
NEUTS SEG NFR BLD AUTO: 49 % (ref 42–75)
NEUTS SEG NFR BLD AUTO: 73 % (ref 42–75)
NEUTS SEG NFR BLD AUTO: 76 % (ref 40–73)
NEUTS SEG NFR BLD AUTO: 78 % (ref 40–73)
NEUTS SEG NFR BLD AUTO: 78 % (ref 40–73)
NEUTS SEG NFR BLD AUTO: 80 % (ref 42–75)
NEUTS SEG NFR BLD AUTO: 84 % (ref 40–73)
NEUTS SEG NFR BLD AUTO: 87 % (ref 40–73)
NEUTS SEG NFR BLD AUTO: 90 % (ref 40–73)
NEUTS SEG NFR BLD AUTO: 90 % (ref 40–73)
NEUTS SEG NFR BLD AUTO: 91 % (ref 40–73)
NEUTS SEG NFR BLD AUTO: 91 % (ref 40–73)
NEUTS SEG NFR BLD AUTO: 92 % (ref 40–73)
O2/TOTAL GAS SETTING VFR VENT: 0.35 %
O2/TOTAL GAS SETTING VFR VENT: 0.35 %
O2/TOTAL GAS SETTING VFR VENT: 0.4 %
O2/TOTAL GAS SETTING VFR VENT: 0.4 %
O2/TOTAL GAS SETTING VFR VENT: 0.7 %
O2/TOTAL GAS SETTING VFR VENT: 0.8 %
O2/TOTAL GAS SETTING VFR VENT: 100 %
O2/TOTAL GAS SETTING VFR VENT: 35 %
O2/TOTAL GAS SETTING VFR VENT: 35 %
O2/TOTAL GAS SETTING VFR VENT: 40 %
O2/TOTAL GAS SETTING VFR VENT: 45 %
O2/TOTAL GAS SETTING VFR VENT: 50 %
O2/TOTAL GAS SETTING VFR VENT: 70 %
O2/TOTAL GAS SETTING VFR VENT: 70 %
O2/TOTAL GAS SETTING VFR VENT: 80 %
O2/TOTAL GAS SETTING VFR VENT: 80 %
OPIATES UR QL: NEGATIVE
ORDER INITIALS, ORDINIT: NORMAL
OSMOLALITY UR: 315 MOSM/KG H2O (ref 300–900)
P-R INTERVAL, ECG05: 178 MS
P-R INTERVAL, ECG05: 226 MS
P-R INTERVAL, ECG05: 246 MS
P-R INTERVAL, ECG05: 264 MS
P-R INTERVAL, ECG05: 264 MS
P-R INTERVAL, ECG05: 80 MS
PCO2 BLD: 26.2 MMHG (ref 35–45)
PCO2 BLD: 26.7 MMHG (ref 35–45)
PCO2 BLD: 29.7 MMHG (ref 35–45)
PCO2 BLD: 29.7 MMHG (ref 35–45)
PCO2 BLD: 29.9 MMHG (ref 35–45)
PCO2 BLD: 30.2 MMHG (ref 35–45)
PCO2 BLD: 30.3 MMHG (ref 35–45)
PCO2 BLD: 30.8 MMHG (ref 35–45)
PCO2 BLD: 30.9 MMHG (ref 35–45)
PCO2 BLD: 30.9 MMHG (ref 35–45)
PCO2 BLD: 31.4 MMHG (ref 35–45)
PCO2 BLD: 31.9 MMHG (ref 35–45)
PCO2 BLD: 32.4 MMHG (ref 35–45)
PCO2 BLD: 32.4 MMHG (ref 35–45)
PCO2 BLD: 32.7 MMHG (ref 35–45)
PCO2 BLD: 33.2 MMHG (ref 35–45)
PCO2 BLD: 34.5 MMHG (ref 35–45)
PCO2 BLD: 34.8 MMHG (ref 35–45)
PCO2 BLD: 40.3 MMHG (ref 35–45)
PCO2 BLD: 41.7 MMHG (ref 35–45)
PCP UR QL: NEGATIVE
PEEP RESPIRATORY: 10 CMH2O
PEEP RESPIRATORY: 5 CMH2O
PEEP RESPIRATORY: 8 CMH2O
PH BLD: 7.24 [PH] (ref 7.35–7.45)
PH BLD: 7.33 [PH] (ref 7.35–7.45)
PH BLD: 7.35 [PH] (ref 7.35–7.45)
PH BLD: 7.38 [PH] (ref 7.35–7.45)
PH BLD: 7.39 [PH] (ref 7.35–7.45)
PH BLD: 7.4 [PH] (ref 7.35–7.45)
PH BLD: 7.41 [PH] (ref 7.35–7.45)
PH BLD: 7.42 [PH] (ref 7.35–7.45)
PH BLD: 7.43 [PH] (ref 7.35–7.45)
PH BLD: 7.44 [PH] (ref 7.35–7.45)
PH BLD: 7.44 [PH] (ref 7.35–7.45)
PH BLD: 7.5 [PH] (ref 7.35–7.45)
PHOSPHATE SERPL-MCNC: 1.5 MG/DL (ref 2.5–4.9)
PHOSPHATE SERPL-MCNC: 1.8 MG/DL (ref 2.5–4.9)
PHOSPHATE SERPL-MCNC: 2.7 MG/DL (ref 2.5–4.9)
PHOSPHATE SERPL-MCNC: 2.8 MG/DL (ref 2.5–4.9)
PHOSPHATE SERPL-MCNC: 3.3 MG/DL (ref 2.5–4.9)
PHOSPHATE SERPL-MCNC: 3.4 MG/DL (ref 2.5–4.9)
PHOSPHATE SERPL-MCNC: 3.5 MG/DL (ref 2.5–4.9)
PHOSPHATE SERPL-MCNC: 3.6 MG/DL (ref 2.5–4.9)
PHOSPHATE SERPL-MCNC: 3.7 MG/DL (ref 2.5–4.9)
PHOSPHATE SERPL-MCNC: 3.9 MG/DL (ref 2.5–4.9)
PHOSPHATE SERPL-MCNC: 4.1 MG/DL (ref 2.5–4.9)
PHOSPHATE SERPL-MCNC: 4.3 MG/DL (ref 2.5–4.9)
PHOSPHATE SERPL-MCNC: 4.3 MG/DL (ref 2.5–4.9)
PIP ISTAT,IPIP: 13
PIP ISTAT,IPIP: 18
PIP ISTAT,IPIP: 19
PIP ISTAT,IPIP: 19
PIP ISTAT,IPIP: 20
PIP ISTAT,IPIP: 22
PIP ISTAT,IPIP: 23
PIP ISTAT,IPIP: 24
PIP ISTAT,IPIP: 25
PIP ISTAT,IPIP: 26
PIP ISTAT,IPIP: 27
PLATELET # BLD AUTO: 164 K/UL (ref 135–420)
PLATELET # BLD AUTO: 172 K/UL (ref 135–420)
PLATELET # BLD AUTO: 173 K/UL (ref 135–420)
PLATELET # BLD AUTO: 185 K/UL (ref 135–420)
PLATELET # BLD AUTO: 192 K/UL (ref 135–420)
PLATELET # BLD AUTO: 201 K/UL (ref 135–420)
PLATELET # BLD AUTO: 223 K/UL (ref 135–420)
PLATELET # BLD AUTO: 228 K/UL (ref 135–420)
PLATELET # BLD AUTO: 232 K/UL (ref 135–420)
PLATELET # BLD AUTO: 233 K/UL (ref 135–420)
PLATELET # BLD AUTO: 241 K/UL (ref 135–420)
PLATELET # BLD AUTO: 244 K/UL (ref 135–420)
PLATELET # BLD AUTO: 244 K/UL (ref 135–420)
PLATELET # BLD AUTO: 266 K/UL (ref 135–420)
PLATELET COMMENTS,PCOM: ABNORMAL
PMV BLD AUTO: 10 FL (ref 9.2–11.8)
PMV BLD AUTO: 10.5 FL (ref 9.2–11.8)
PMV BLD AUTO: 10.5 FL (ref 9.2–11.8)
PMV BLD AUTO: 10.7 FL (ref 9.2–11.8)
PMV BLD AUTO: 10.9 FL (ref 9.2–11.8)
PMV BLD AUTO: 9.5 FL (ref 9.2–11.8)
PMV BLD AUTO: 9.7 FL (ref 9.2–11.8)
PMV BLD AUTO: 9.8 FL (ref 9.2–11.8)
PMV BLD AUTO: 9.8 FL (ref 9.2–11.8)
PMV BLD AUTO: 9.9 FL (ref 9.2–11.8)
PMV BLD AUTO: 9.9 FL (ref 9.2–11.8)
PO2 BLD: 121 MMHG (ref 80–100)
PO2 BLD: 124 MMHG (ref 80–100)
PO2 BLD: 128 MMHG (ref 80–100)
PO2 BLD: 139 MMHG (ref 80–100)
PO2 BLD: 413 MMHG (ref 80–100)
PO2 BLD: 48 MMHG (ref 80–100)
PO2 BLD: 57 MMHG (ref 80–100)
PO2 BLD: 67 MMHG (ref 80–100)
PO2 BLD: 68 MMHG (ref 80–100)
PO2 BLD: 74 MMHG (ref 80–100)
PO2 BLD: 75 MMHG (ref 80–100)
PO2 BLD: 77 MMHG (ref 80–100)
PO2 BLD: 80 MMHG (ref 80–100)
PO2 BLD: 80 MMHG (ref 80–100)
PO2 BLD: 86 MMHG (ref 80–100)
PO2 BLD: 88 MMHG (ref 80–100)
PO2 BLD: 89 MMHG (ref 80–100)
PO2 BLD: 92 MMHG (ref 80–100)
PO2 BLD: 94 MMHG (ref 80–100)
PO2 BLD: 96 MMHG (ref 80–100)
POTASSIUM SERPL-SCNC: 3.6 MMOL/L (ref 3.5–5.5)
POTASSIUM SERPL-SCNC: 3.8 MMOL/L (ref 3.5–5.5)
POTASSIUM SERPL-SCNC: 3.9 MMOL/L (ref 3.5–5.5)
POTASSIUM SERPL-SCNC: 4.1 MMOL/L (ref 3.5–5.5)
POTASSIUM SERPL-SCNC: 4.3 MMOL/L (ref 3.5–5.5)
POTASSIUM SERPL-SCNC: 4.4 MMOL/L (ref 3.5–5.5)
POTASSIUM SERPL-SCNC: 4.5 MMOL/L (ref 3.5–5.5)
POTASSIUM SERPL-SCNC: 4.6 MMOL/L (ref 3.5–5.5)
POTASSIUM SERPL-SCNC: 4.8 MMOL/L (ref 3.5–5.5)
POTASSIUM SERPL-SCNC: 5.3 MMOL/L (ref 3.5–5.5)
PRESSURE CONTROL, IPC: YES
PROT SERPL-MCNC: 5.6 G/DL (ref 6.4–8.2)
PROT SERPL-MCNC: 5.9 G/DL (ref 6.4–8.2)
PROT SERPL-MCNC: 6.1 G/DL (ref 6.4–8.2)
PROT SERPL-MCNC: 6.2 G/DL (ref 6.4–8.2)
PROT SERPL-MCNC: 6.5 G/DL (ref 6.4–8.2)
PROT SERPL-MCNC: 6.6 G/DL (ref 6.4–8.2)
PROT SERPL-MCNC: 7.2 G/DL (ref 6.4–8.2)
PROT SERPL-MCNC: 7.3 G/DL (ref 6.4–8.2)
PROT SERPL-MCNC: 7.5 G/DL (ref 6.4–8.2)
PROT SERPL-MCNC: 7.9 G/DL (ref 6.4–8.2)
PROTHROMBIN TIME: 17.1 SEC (ref 11.5–15.2)
PROTHROMBIN TIME: 17.3 SEC (ref 11.5–15.2)
PROTHROMBIN TIME: 17.8 SEC (ref 11.5–15.2)
PROTHROMBIN TIME: 19.1 SEC (ref 11.5–15.2)
PROTHROMBIN TIME: 21.6 SEC (ref 11.5–15.2)
PROTHROMBIN TIME: 21.9 SEC (ref 11.5–15.2)
PROTHROMBIN TIME: 27.1 SEC (ref 11.5–15.2)
PROTHROMBIN TIME: 28 SEC (ref 11.5–15.2)
PROTHROMBIN TIME: 31.6 SEC (ref 11.5–15.2)
PROTHROMBIN TIME: 34.8 SEC (ref 11.5–15.2)
PROTHROMBIN TIME: 36.6 SEC (ref 11.5–15.2)
PROTHROMBIN TIME: 40.1 SEC (ref 11.5–15.2)
PROTHROMBIN TIME: 43.5 SEC (ref 11.5–15.2)
PROTHROMBIN TIME: 47.4 SEC (ref 11.5–15.2)
Q-T INTERVAL, ECG07: 414 MS
Q-T INTERVAL, ECG07: 420 MS
Q-T INTERVAL, ECG07: 422 MS
Q-T INTERVAL, ECG07: 422 MS
Q-T INTERVAL, ECG07: 426 MS
Q-T INTERVAL, ECG07: 436 MS
Q-T INTERVAL, ECG07: 442 MS
Q-T INTERVAL, ECG07: 532 MS
Q-T INTERVAL, ECG07: 556 MS
Q-T INTERVAL, ECG07: 650 MS
QRS DURATION, ECG06: 104 MS
QRS DURATION, ECG06: 106 MS
QRS DURATION, ECG06: 110 MS
QRS DURATION, ECG06: 110 MS
QRS DURATION, ECG06: 116 MS
QRS DURATION, ECG06: 138 MS
QRS DURATION, ECG06: 192 MS
QRS DURATION, ECG06: 240 MS
QRS DURATION, ECG06: 246 MS
QRS DURATION, ECG06: 92 MS
QTC CALCULATION (BEZET), ECG08: 416 MS
QTC CALCULATION (BEZET), ECG08: 422 MS
QTC CALCULATION (BEZET), ECG08: 429 MS
QTC CALCULATION (BEZET), ECG08: 435 MS
QTC CALCULATION (BEZET), ECG08: 436 MS
QTC CALCULATION (BEZET), ECG08: 506 MS
QTC CALCULATION (BEZET), ECG08: 538 MS
QTC CALCULATION (BEZET), ECG08: 556 MS
QTC CALCULATION (BEZET), ECG08: 582 MS
QTC CALCULATION (BEZET), ECG08: 643 MS
RBC # BLD AUTO: 2.66 M/UL (ref 4.7–5.5)
RBC # BLD AUTO: 2.7 M/UL (ref 4.7–5.5)
RBC # BLD AUTO: 2.78 M/UL (ref 4.7–5.5)
RBC # BLD AUTO: 3.03 M/UL (ref 4.7–5.5)
RBC # BLD AUTO: 3.17 M/UL (ref 4.7–5.5)
RBC # BLD AUTO: 3.53 M/UL (ref 4.7–5.5)
RBC # BLD AUTO: 3.89 M/UL (ref 4.7–5.5)
RBC # BLD AUTO: 3.92 M/UL (ref 4.7–5.5)
RBC # BLD AUTO: 3.95 M/UL (ref 4.7–5.5)
RBC # BLD AUTO: 4.19 M/UL (ref 4.7–5.5)
RBC # BLD AUTO: 4.23 M/UL (ref 4.7–5.5)
RBC # BLD AUTO: 4.29 M/UL (ref 4.7–5.5)
RBC # BLD AUTO: 4.38 M/UL (ref 4.7–5.5)
RBC # BLD AUTO: 4.38 M/UL (ref 4.7–5.5)
RBC MORPH BLD: ABNORMAL
SAO2 % BLD: 100 % (ref 92–97)
SAO2 % BLD: 85 % (ref 92–97)
SAO2 % BLD: 90 % (ref 92–97)
SAO2 % BLD: 91 % (ref 92–97)
SAO2 % BLD: 93 % (ref 92–97)
SAO2 % BLD: 95 % (ref 92–97)
SAO2 % BLD: 95 % (ref 92–97)
SAO2 % BLD: 96 % (ref 92–97)
SAO2 % BLD: 96 % (ref 92–97)
SAO2 % BLD: 97 % (ref 92–97)
SAO2 % BLD: 98 % (ref 92–97)
SAO2 % BLD: 98 % (ref 92–97)
SAO2 % BLD: 99 % (ref 92–97)
SERVICE CMNT-IMP: ABNORMAL
SERVICE CMNT-IMP: NORMAL
SODIUM SERPL-SCNC: 138 MMOL/L (ref 136–145)
SODIUM SERPL-SCNC: 139 MMOL/L (ref 136–145)
SODIUM SERPL-SCNC: 140 MMOL/L (ref 136–145)
SODIUM SERPL-SCNC: 141 MMOL/L (ref 136–145)
SODIUM SERPL-SCNC: 142 MMOL/L (ref 136–145)
SODIUM SERPL-SCNC: 142 MMOL/L (ref 136–145)
SODIUM SERPL-SCNC: 143 MMOL/L (ref 136–145)
SODIUM SERPL-SCNC: 146 MMOL/L (ref 136–145)
SODIUM SERPL-SCNC: 151 MMOL/L (ref 136–145)
SPECIMEN TYPE: ABNORMAL
TOTAL RESP. RATE, ITRR: 14
TOTAL RESP. RATE, ITRR: 15
TOTAL RESP. RATE, ITRR: 16
TOTAL RESP. RATE, ITRR: 18
TOTAL RESP. RATE, ITRR: 19
TOTAL RESP. RATE, ITRR: 21
TOTAL RESP. RATE, ITRR: 27
TOTAL RESP. RATE, ITRR: 30
TOTAL RESP. RATE, ITRR: 30
TOTAL RESP. RATE, ITRR: 34
TOTAL RESP. RATE, ITRR: 37
TOTAL RESP. RATE, ITRR: 38
TOTAL RESP. RATE, ITRR: 39
TROPONIN I SERPL-MCNC: 0.06 NG/ML (ref 0–0.04)
TROPONIN I SERPL-MCNC: 3.62 NG/ML (ref 0–0.04)
TROPONIN I SERPL-MCNC: 4.16 NG/ML (ref 0–0.04)
TROPONIN I SERPL-MCNC: 4.52 NG/ML (ref 0–0.04)
VANCOMYCIN SERPL-MCNC: 11.3 UG/ML (ref 5–40)
VENTILATION MODE VENT: ABNORMAL
VENTRICULAR RATE, ECG03: 59 BPM
VENTRICULAR RATE, ECG03: 60 BPM
VENTRICULAR RATE, ECG03: 61 BPM
VENTRICULAR RATE, ECG03: 63 BPM
VENTRICULAR RATE, ECG03: 63 BPM
VENTRICULAR RATE, ECG03: 72 BPM
VENTRICULAR RATE, ECG03: 79 BPM
VENTRICULAR RATE, ECG03: 98 BPM
VOLUME CONTROL PLUS IVLCP: YES
VT SETTING VENT: 429 ML
VT SETTING VENT: 450 ML
WBC # BLD AUTO: 10 K/UL (ref 4.6–13.2)
WBC # BLD AUTO: 11.1 K/UL (ref 4.6–13.2)
WBC # BLD AUTO: 11.6 K/UL (ref 4.6–13.2)
WBC # BLD AUTO: 12.2 K/UL (ref 4.6–13.2)
WBC # BLD AUTO: 13.9 K/UL (ref 4.6–13.2)
WBC # BLD AUTO: 14.3 K/UL (ref 4.6–13.2)
WBC # BLD AUTO: 14.4 K/UL (ref 4.6–13.2)
WBC # BLD AUTO: 15.6 K/UL (ref 4.6–13.2)
WBC # BLD AUTO: 16.2 K/UL (ref 4.6–13.2)
WBC # BLD AUTO: 7.3 K/UL (ref 4.6–13.2)
WBC # BLD AUTO: 8.2 K/UL (ref 4.6–13.2)
WBC # BLD AUTO: 8.5 K/UL (ref 4.6–13.2)
WBC # BLD AUTO: 8.8 K/UL (ref 4.6–13.2)
WBC # BLD AUTO: 9.6 K/UL (ref 4.6–13.2)
WBC MORPH BLD: ABNORMAL

## 2017-01-01 PROCEDURE — 36600 WITHDRAWAL OF ARTERIAL BLOOD: CPT

## 2017-01-01 PROCEDURE — C9113 INJ PANTOPRAZOLE SODIUM, VIA: HCPCS | Performed by: INTERNAL MEDICINE

## 2017-01-01 PROCEDURE — 85730 THROMBOPLASTIN TIME PARTIAL: CPT | Performed by: INTERNAL MEDICINE

## 2017-01-01 PROCEDURE — 83735 ASSAY OF MAGNESIUM: CPT | Performed by: EMERGENCY MEDICINE

## 2017-01-01 PROCEDURE — 31500 INSERT EMERGENCY AIRWAY: CPT

## 2017-01-01 PROCEDURE — 80053 COMPREHEN METABOLIC PANEL: CPT | Performed by: INTERNAL MEDICINE

## 2017-01-01 PROCEDURE — 74011250636 HC RX REV CODE- 250/636: Performed by: NURSE PRACTITIONER

## 2017-01-01 PROCEDURE — 93005 ELECTROCARDIOGRAM TRACING: CPT

## 2017-01-01 PROCEDURE — 65610000006 HC RM INTENSIVE CARE

## 2017-01-01 PROCEDURE — 74011250636 HC RX REV CODE- 250/636: Performed by: INTERNAL MEDICINE

## 2017-01-01 PROCEDURE — 36592 COLLECT BLOOD FROM PICC: CPT

## 2017-01-01 PROCEDURE — 77030011256 HC DRSG MEPILEX <16IN NO BORD MOLN -A

## 2017-01-01 PROCEDURE — 77030008875 HC ADAPT FEED BARD -A

## 2017-01-01 PROCEDURE — 74011636637 HC RX REV CODE- 636/637: Performed by: INTERNAL MEDICINE

## 2017-01-01 PROCEDURE — 36415 COLL VENOUS BLD VENIPUNCTURE: CPT | Performed by: INTERNAL MEDICINE

## 2017-01-01 PROCEDURE — 82962 GLUCOSE BLOOD TEST: CPT

## 2017-01-01 PROCEDURE — 83605 ASSAY OF LACTIC ACID: CPT | Performed by: HOSPITALIST

## 2017-01-01 PROCEDURE — 84100 ASSAY OF PHOSPHORUS: CPT | Performed by: INTERNAL MEDICINE

## 2017-01-01 PROCEDURE — 85025 COMPLETE CBC W/AUTO DIFF WBC: CPT | Performed by: INTERNAL MEDICINE

## 2017-01-01 PROCEDURE — 74011000250 HC RX REV CODE- 250: Performed by: INTERNAL MEDICINE

## 2017-01-01 PROCEDURE — 77030019896 HC KT ARTERIAL LN TELE -B

## 2017-01-01 PROCEDURE — 87070 CULTURE OTHR SPECIMN AEROBIC: CPT | Performed by: PHYSICIAN ASSISTANT

## 2017-01-01 PROCEDURE — 85610 PROTHROMBIN TIME: CPT | Performed by: INTERNAL MEDICINE

## 2017-01-01 PROCEDURE — 95822 EEG COMA OR SLEEP ONLY: CPT

## 2017-01-01 PROCEDURE — 0BH17EZ INSERTION OF ENDOTRACHEAL AIRWAY INTO TRACHEA, VIA NATURAL OR ARTIFICIAL OPENING: ICD-10-PCS | Performed by: EMERGENCY MEDICINE

## 2017-01-01 PROCEDURE — 96365 THER/PROPH/DIAG IV INF INIT: CPT

## 2017-01-01 PROCEDURE — 83880 ASSAY OF NATRIURETIC PEPTIDE: CPT | Performed by: EMERGENCY MEDICINE

## 2017-01-01 PROCEDURE — 87086 URINE CULTURE/COLONY COUNT: CPT | Performed by: PHYSICIAN ASSISTANT

## 2017-01-01 PROCEDURE — 74011000258 HC RX REV CODE- 258: Performed by: INTERNAL MEDICINE

## 2017-01-01 PROCEDURE — 74011250636 HC RX REV CODE- 250/636

## 2017-01-01 PROCEDURE — 80307 DRUG TEST PRSMV CHEM ANLYZR: CPT | Performed by: EMERGENCY MEDICINE

## 2017-01-01 PROCEDURE — 74011000258 HC RX REV CODE- 258: Performed by: NURSE PRACTITIONER

## 2017-01-01 PROCEDURE — 71010 XR CHEST PORT: CPT

## 2017-01-01 PROCEDURE — 87040 BLOOD CULTURE FOR BACTERIA: CPT | Performed by: INTERNAL MEDICINE

## 2017-01-01 PROCEDURE — 95816 EEG AWAKE AND DROWSY: CPT | Performed by: NURSE PRACTITIONER

## 2017-01-01 PROCEDURE — 83735 ASSAY OF MAGNESIUM: CPT | Performed by: INTERNAL MEDICINE

## 2017-01-01 PROCEDURE — 74011000258 HC RX REV CODE- 258: Performed by: PSYCHIATRY & NEUROLOGY

## 2017-01-01 PROCEDURE — 70450 CT HEAD/BRAIN W/O DYE: CPT

## 2017-01-01 PROCEDURE — 74011250636 HC RX REV CODE- 250/636: Performed by: EMERGENCY MEDICINE

## 2017-01-01 PROCEDURE — 82330 ASSAY OF CALCIUM: CPT | Performed by: INTERNAL MEDICINE

## 2017-01-01 PROCEDURE — 74011250637 HC RX REV CODE- 250/637: Performed by: PHYSICIAN ASSISTANT

## 2017-01-01 PROCEDURE — 74011250636 HC RX REV CODE- 250/636: Performed by: PHYSICIAN ASSISTANT

## 2017-01-01 PROCEDURE — 82553 CREATINE MB FRACTION: CPT | Performed by: EMERGENCY MEDICINE

## 2017-01-01 PROCEDURE — 82550 ASSAY OF CK (CPK): CPT | Performed by: INTERNAL MEDICINE

## 2017-01-01 PROCEDURE — 94002 VENT MGMT INPAT INIT DAY: CPT

## 2017-01-01 PROCEDURE — 83605 ASSAY OF LACTIC ACID: CPT | Performed by: INTERNAL MEDICINE

## 2017-01-01 PROCEDURE — 82803 BLOOD GASES ANY COMBINATION: CPT

## 2017-01-01 PROCEDURE — 99285 EMERGENCY DEPT VISIT HI MDM: CPT

## 2017-01-01 PROCEDURE — C8929 TTE W OR WO FOL WCON,DOPPLER: HCPCS

## 2017-01-01 PROCEDURE — 77030013797 HC KT TRNSDUC PRSSR EDWD -A

## 2017-01-01 PROCEDURE — 87186 SC STD MICRODIL/AGAR DIL: CPT | Performed by: INTERNAL MEDICINE

## 2017-01-01 PROCEDURE — 74011000250 HC RX REV CODE- 250: Performed by: PSYCHIATRY & NEUROLOGY

## 2017-01-01 PROCEDURE — 77030005514 HC CATH URETH FOL14 BARD -A

## 2017-01-01 PROCEDURE — 5A1955Z RESPIRATORY VENTILATION, GREATER THAN 96 CONSECUTIVE HOURS: ICD-10-PCS | Performed by: EMERGENCY MEDICINE

## 2017-01-01 PROCEDURE — 74011636637 HC RX REV CODE- 636/637: Performed by: NURSE PRACTITIONER

## 2017-01-01 PROCEDURE — 74011250637 HC RX REV CODE- 250/637: Performed by: INTERNAL MEDICINE

## 2017-01-01 PROCEDURE — 77030029698 HC PAD INNRCOOL STX L/XL PHIL -F

## 2017-01-01 PROCEDURE — 77030008768 HC TU NG VYGC -A

## 2017-01-01 PROCEDURE — 87077 CULTURE AEROBIC IDENTIFY: CPT | Performed by: INTERNAL MEDICINE

## 2017-01-01 PROCEDURE — 96366 THER/PROPH/DIAG IV INF ADDON: CPT

## 2017-01-01 PROCEDURE — 02HV33Z INSERTION OF INFUSION DEVICE INTO SUPERIOR VENA CAVA, PERCUTANEOUS APPROACH: ICD-10-PCS | Performed by: INTERNAL MEDICINE

## 2017-01-01 PROCEDURE — 36620 INSERTION CATHETER ARTERY: CPT

## 2017-01-01 PROCEDURE — 96368 THER/DIAG CONCURRENT INF: CPT

## 2017-01-01 PROCEDURE — 94003 VENT MGMT INPAT SUBQ DAY: CPT

## 2017-01-01 PROCEDURE — 80202 ASSAY OF VANCOMYCIN: CPT | Performed by: PHYSICIAN ASSISTANT

## 2017-01-01 PROCEDURE — 80053 COMPREHEN METABOLIC PANEL: CPT | Performed by: EMERGENCY MEDICINE

## 2017-01-01 PROCEDURE — 87040 BLOOD CULTURE FOR BACTERIA: CPT | Performed by: PHYSICIAN ASSISTANT

## 2017-01-01 PROCEDURE — 85730 THROMBOPLASTIN TIME PARTIAL: CPT | Performed by: EMERGENCY MEDICINE

## 2017-01-01 PROCEDURE — 85379 FIBRIN DEGRADATION QUANT: CPT | Performed by: EMERGENCY MEDICINE

## 2017-01-01 PROCEDURE — C1894 INTRO/SHEATH, NON-LASER: HCPCS

## 2017-01-01 PROCEDURE — 65270000029 HC RM PRIVATE

## 2017-01-01 PROCEDURE — 51702 INSERT TEMP BLADDER CATH: CPT

## 2017-01-01 PROCEDURE — 74011000250 HC RX REV CODE- 250: Performed by: PHYSICIAN ASSISTANT

## 2017-01-01 PROCEDURE — C1751 CATH, INF, PER/CENT/MIDLINE: HCPCS

## 2017-01-01 PROCEDURE — 83036 HEMOGLOBIN GLYCOSYLATED A1C: CPT | Performed by: INTERNAL MEDICINE

## 2017-01-01 PROCEDURE — 77030008683 HC TU ET CUF COVD -A

## 2017-01-01 PROCEDURE — 74011000250 HC RX REV CODE- 250: Performed by: EMERGENCY MEDICINE

## 2017-01-01 PROCEDURE — 87070 CULTURE OTHR SPECIMN AEROBIC: CPT | Performed by: INTERNAL MEDICINE

## 2017-01-01 PROCEDURE — 77030029697 HC PAD INNRCOOL STX S/M PHIL -F

## 2017-01-01 PROCEDURE — 93970 EXTREMITY STUDY: CPT

## 2017-01-01 PROCEDURE — 87186 SC STD MICRODIL/AGAR DIL: CPT | Performed by: PHYSICIAN ASSISTANT

## 2017-01-01 PROCEDURE — 85025 COMPLETE CBC W/AUTO DIFF WBC: CPT | Performed by: EMERGENCY MEDICINE

## 2017-01-01 PROCEDURE — 83935 ASSAY OF URINE OSMOLALITY: CPT | Performed by: INTERNAL MEDICINE

## 2017-01-01 PROCEDURE — 85610 PROTHROMBIN TIME: CPT | Performed by: EMERGENCY MEDICINE

## 2017-01-01 PROCEDURE — 95816 EEG AWAKE AND DROWSY: CPT | Performed by: PSYCHIATRY & NEUROLOGY

## 2017-01-01 PROCEDURE — 36556 INSERT NON-TUNNEL CV CATH: CPT

## 2017-01-01 PROCEDURE — 92950 HEART/LUNG RESUSCITATION CPR: CPT

## 2017-01-01 PROCEDURE — 87077 CULTURE AEROBIC IDENTIFY: CPT | Performed by: PHYSICIAN ASSISTANT

## 2017-01-01 RX ORDER — PHYTONADIONE 10 MG/ML
1 INJECTION, EMULSION INTRAMUSCULAR; INTRAVENOUS; SUBCUTANEOUS ONCE
Status: COMPLETED | OUTPATIENT
Start: 2017-01-01 | End: 2017-01-01

## 2017-01-01 RX ORDER — MORPHINE SULFATE 5 MG/ML
INJECTION, SOLUTION INTRAVENOUS
Status: DISCONTINUED | OUTPATIENT
Start: 2017-01-01 | End: 2017-01-01 | Stop reason: HOSPADM

## 2017-01-01 RX ORDER — VECURONIUM BROMIDE FOR INJECTION 1 MG/ML
9 INJECTION, POWDER, LYOPHILIZED, FOR SOLUTION INTRAVENOUS
Status: DISPENSED | OUTPATIENT
Start: 2017-01-01 | End: 2017-01-01

## 2017-01-01 RX ORDER — LORAZEPAM 2 MG/ML
1 CONCENTRATE ORAL
Qty: 1 BOTTLE | Refills: 0 | Status: SHIPPED | OUTPATIENT
Start: 2017-01-01

## 2017-01-01 RX ORDER — MIDAZOLAM HYDROCHLORIDE 1 MG/ML
2 INJECTION, SOLUTION INTRAMUSCULAR; INTRAVENOUS ONCE
Status: COMPLETED | OUTPATIENT
Start: 2017-01-01 | End: 2017-01-01

## 2017-01-01 RX ORDER — SCOLOPAMINE TRANSDERMAL SYSTEM 1 MG/1
1.5 PATCH, EXTENDED RELEASE TRANSDERMAL
Status: DISCONTINUED | OUTPATIENT
Start: 2017-01-01 | End: 2017-01-01 | Stop reason: HOSPADM

## 2017-01-01 RX ORDER — DEXTROSE 50 % IN WATER (D50W) INTRAVENOUS SYRINGE
Status: COMPLETED | OUTPATIENT
Start: 2017-01-01 | End: 2017-01-01

## 2017-01-01 RX ORDER — SCOLOPAMINE TRANSDERMAL SYSTEM 1 MG/1
1.5 PATCH, EXTENDED RELEASE TRANSDERMAL
Qty: 2 PATCH | Refills: 0 | Status: SHIPPED | OUTPATIENT
Start: 2017-01-01

## 2017-01-01 RX ORDER — MAGNESIUM SULFATE HEPTAHYDRATE 40 MG/ML
4 INJECTION, SOLUTION INTRAVENOUS
Status: DISCONTINUED | OUTPATIENT
Start: 2017-01-01 | End: 2017-01-01

## 2017-01-01 RX ORDER — SODIUM CHLORIDE 9 MG/ML
50 INJECTION, SOLUTION INTRAVENOUS CONTINUOUS
Status: DISCONTINUED | OUTPATIENT
Start: 2017-01-01 | End: 2017-01-01

## 2017-01-01 RX ORDER — LORAZEPAM 2 MG/ML
1 INJECTION INTRAMUSCULAR
Qty: 1 VIAL | Refills: 0 | Status: SHIPPED | OUTPATIENT
Start: 2017-01-01

## 2017-01-01 RX ORDER — EPINEPHRINE 0.1 MG/ML
INJECTION INTRACARDIAC; INTRAVENOUS
Status: COMPLETED | OUTPATIENT
Start: 2017-01-01 | End: 2017-01-01

## 2017-01-01 RX ORDER — MORPHINE SULFATE 5 MG/ML
INJECTION, SOLUTION INTRAVENOUS
Status: DISCONTINUED | OUTPATIENT
Start: 2017-01-01 | End: 2017-01-01 | Stop reason: DRUGHIGH

## 2017-01-01 RX ORDER — VECURONIUM BROMIDE FOR INJECTION 1 MG/ML
0.1 INJECTION, POWDER, LYOPHILIZED, FOR SOLUTION INTRAVENOUS ONCE
Status: COMPLETED | OUTPATIENT
Start: 2017-01-01 | End: 2017-01-01

## 2017-01-01 RX ORDER — MIDAZOLAM HYDROCHLORIDE 1 MG/ML
5 INJECTION, SOLUTION INTRAMUSCULAR; INTRAVENOUS ONCE
Status: COMPLETED | OUTPATIENT
Start: 2017-01-01 | End: 2017-01-01

## 2017-01-01 RX ORDER — INSULIN LISPRO 100 [IU]/ML
INJECTION, SOLUTION INTRAVENOUS; SUBCUTANEOUS EVERY 6 HOURS
Status: DISCONTINUED | OUTPATIENT
Start: 2017-01-01 | End: 2017-01-01

## 2017-01-01 RX ORDER — AMIODARONE HYDROCHLORIDE 200 MG/1
200 TABLET ORAL 2 TIMES DAILY
Status: DISCONTINUED | OUTPATIENT
Start: 2017-01-01 | End: 2017-01-01

## 2017-01-01 RX ORDER — MORPHINE SULFATE IN 0.9 % NACL 1 MG/ML
1-8 PLASTIC BAG, INJECTION (ML) INTRAVENOUS
Status: DISCONTINUED | OUTPATIENT
Start: 2017-01-01 | End: 2017-01-01 | Stop reason: CLARIF

## 2017-01-01 RX ORDER — PROPOFOL 10 MG/ML
INJECTION, EMULSION INTRAVENOUS
Status: DISPENSED
Start: 2017-01-01 | End: 2017-01-01

## 2017-01-01 RX ORDER — CALCIUM CHLORIDE INJECTION 100 MG/ML
INJECTION, SOLUTION INTRAVENOUS
Status: COMPLETED | OUTPATIENT
Start: 2017-01-01 | End: 2017-01-01

## 2017-01-01 RX ORDER — SODIUM BICARBONATE 1 MEQ/ML
SYRINGE (ML) INTRAVENOUS
Status: COMPLETED | OUTPATIENT
Start: 2017-01-01 | End: 2017-01-01

## 2017-01-01 RX ORDER — FENTANYL CITRATE 50 UG/ML
50-100 INJECTION, SOLUTION INTRAMUSCULAR; INTRAVENOUS
Status: DISCONTINUED | OUTPATIENT
Start: 2017-01-01 | End: 2017-01-01

## 2017-01-01 RX ORDER — DEXTROSE 50 % IN WATER (D50W) INTRAVENOUS SYRINGE
25-50 AS NEEDED
Status: DISCONTINUED | OUTPATIENT
Start: 2017-01-01 | End: 2017-01-01

## 2017-01-01 RX ORDER — PROPOFOL 10 MG/ML
5-50 VIAL (ML) INTRAVENOUS
Status: DISCONTINUED | OUTPATIENT
Start: 2017-01-01 | End: 2017-01-01

## 2017-01-01 RX ORDER — PHENOBARBITAL SODIUM 65 MG/ML
30 INJECTION INTRAMUSCULAR EVERY 6 HOURS
Status: DISCONTINUED | OUTPATIENT
Start: 2017-01-01 | End: 2017-01-01 | Stop reason: HOSPADM

## 2017-01-01 RX ORDER — LORAZEPAM 2 MG/ML
2-4 INJECTION INTRAMUSCULAR
Status: DISCONTINUED | OUTPATIENT
Start: 2017-01-01 | End: 2017-01-01

## 2017-01-01 RX ORDER — MORPHINE SULFATE 4 MG/ML
4 INJECTION, SOLUTION INTRAMUSCULAR; INTRAVENOUS
Status: DISCONTINUED | OUTPATIENT
Start: 2017-01-01 | End: 2017-01-01 | Stop reason: HOSPADM

## 2017-01-01 RX ORDER — LEVETIRACETAM 5 MG/ML
500 INJECTION INTRAVASCULAR EVERY 12 HOURS
Status: DISCONTINUED | OUTPATIENT
Start: 2017-01-01 | End: 2017-01-01

## 2017-01-01 RX ORDER — AMIODARONE HYDROCHLORIDE 150 MG/3ML
INJECTION, SOLUTION INTRAVENOUS
Status: COMPLETED | OUTPATIENT
Start: 2017-01-01 | End: 2017-01-01

## 2017-01-01 RX ORDER — MIDAZOLAM HYDROCHLORIDE 1 MG/ML
INJECTION, SOLUTION INTRAMUSCULAR; INTRAVENOUS
Status: COMPLETED
Start: 2017-01-01 | End: 2017-01-01

## 2017-01-01 RX ORDER — LORAZEPAM 2 MG/ML
2 INJECTION INTRAMUSCULAR
Status: DISCONTINUED | OUTPATIENT
Start: 2017-01-01 | End: 2017-01-01

## 2017-01-01 RX ORDER — LORAZEPAM 2 MG/ML
1 INJECTION INTRAMUSCULAR
Status: DISCONTINUED | OUTPATIENT
Start: 2017-01-01 | End: 2017-01-01 | Stop reason: HOSPADM

## 2017-01-01 RX ORDER — LEVETIRACETAM 10 MG/ML
1000 INJECTION INTRAVASCULAR EVERY 12 HOURS
Status: DISCONTINUED | OUTPATIENT
Start: 2017-01-01 | End: 2017-01-01

## 2017-01-01 RX ORDER — LORAZEPAM 2 MG/ML
INJECTION INTRAMUSCULAR
Status: COMPLETED
Start: 2017-01-01 | End: 2017-01-01

## 2017-01-01 RX ORDER — MIDAZOLAM IN 0.9 % SOD.CHLORID 1 MG/ML
2-10 PLASTIC BAG, INJECTION (ML) INTRAVENOUS CONTINUOUS
Status: DISCONTINUED | OUTPATIENT
Start: 2017-01-01 | End: 2017-01-01

## 2017-01-01 RX ORDER — MAGNESIUM SULFATE 100 %
4 CRYSTALS MISCELLANEOUS AS NEEDED
Status: DISCONTINUED | OUTPATIENT
Start: 2017-01-01 | End: 2017-01-01

## 2017-01-01 RX ORDER — MAGNESIUM SULFATE HEPTAHYDRATE 40 MG/ML
4 INJECTION, SOLUTION INTRAVENOUS
Status: COMPLETED | OUTPATIENT
Start: 2017-01-01 | End: 2017-01-01

## 2017-01-01 RX ORDER — PHENOBARBITAL SODIUM 65 MG/ML
30 INJECTION INTRAMUSCULAR EVERY 6 HOURS
Qty: 1 VIAL | Refills: 0 | Status: SHIPPED | OUTPATIENT
Start: 2017-01-01

## 2017-01-01 RX ORDER — GLYCOPYRROLATE 0.2 MG/ML
0.2 INJECTION INTRAMUSCULAR; INTRAVENOUS
Qty: 1 VIAL | Refills: 0 | Status: SHIPPED | OUTPATIENT
Start: 2017-01-01

## 2017-01-01 RX ORDER — LORAZEPAM 2 MG/ML
1 CONCENTRATE ORAL
Status: DISCONTINUED | OUTPATIENT
Start: 2017-01-01 | End: 2017-01-01 | Stop reason: HOSPADM

## 2017-01-01 RX ORDER — MIDAZOLAM IN 0.9 % SOD.CHLORID 1 MG/ML
1-8 PLASTIC BAG, INJECTION (ML) INTRAVENOUS
Status: DISCONTINUED | OUTPATIENT
Start: 2017-01-01 | End: 2017-01-01

## 2017-01-01 RX ORDER — MORPHINE SULFATE 8 MG/ML
.8-8 INJECTION, SOLUTION INTRAMUSCULAR; INTRAVENOUS CONTINUOUS
Status: DISCONTINUED | OUTPATIENT
Start: 2017-01-01 | End: 2017-01-01 | Stop reason: CLARIF

## 2017-01-01 RX ORDER — LEVOFLOXACIN 5 MG/ML
750 INJECTION, SOLUTION INTRAVENOUS
Status: DISCONTINUED | OUTPATIENT
Start: 2017-01-01 | End: 2017-01-01

## 2017-01-01 RX ORDER — MIDAZOLAM HYDROCHLORIDE 1 MG/ML
2-5 INJECTION, SOLUTION INTRAMUSCULAR; INTRAVENOUS
Status: DISCONTINUED | OUTPATIENT
Start: 2017-01-01 | End: 2017-01-01

## 2017-01-01 RX ORDER — FENTANYL CITRATE 50 UG/ML
50 INJECTION, SOLUTION INTRAMUSCULAR; INTRAVENOUS ONCE
Status: COMPLETED | OUTPATIENT
Start: 2017-01-01 | End: 2017-01-01

## 2017-01-01 RX ORDER — MIDAZOLAM IN 0.9 % SOD.CHLORID 1 MG/ML
0-10 PLASTIC BAG, INJECTION (ML) INTRAVENOUS
Status: DISCONTINUED | OUTPATIENT
Start: 2017-01-01 | End: 2017-01-01

## 2017-01-01 RX ORDER — MORPHINE SULFATE 4 MG/ML
4 INJECTION, SOLUTION INTRAMUSCULAR; INTRAVENOUS
Qty: 20 ML | Refills: 0 | Status: SHIPPED | OUTPATIENT
Start: 2017-01-01

## 2017-01-01 RX ORDER — GLYCOPYRROLATE 0.2 MG/ML
0.2 INJECTION INTRAMUSCULAR; INTRAVENOUS
Status: DISCONTINUED | OUTPATIENT
Start: 2017-01-01 | End: 2017-01-01 | Stop reason: HOSPADM

## 2017-01-01 RX ORDER — PHENOBARBITAL SODIUM 65 MG/ML
65 INJECTION INTRAMUSCULAR EVERY 6 HOURS
Status: DISCONTINUED | OUTPATIENT
Start: 2017-01-01 | End: 2017-01-01

## 2017-01-01 RX ORDER — BUSPIRONE HYDROCHLORIDE 5 MG/1
20 TABLET ORAL ONCE
Status: COMPLETED | OUTPATIENT
Start: 2017-01-01 | End: 2017-01-01

## 2017-01-01 RX ADMIN — PROPOFOL 20 MCG/KG/MIN: 10 INJECTION, EMULSION INTRAVENOUS at 10:58

## 2017-01-01 RX ADMIN — AMIODARONE HYDROCHLORIDE 1 MG/MIN: 1.8 INJECTION, SOLUTION INTRAVENOUS at 10:34

## 2017-01-01 RX ADMIN — CEFTRIAXONE 1 G: 1 INJECTION, POWDER, FOR SOLUTION INTRAMUSCULAR; INTRAVENOUS at 10:56

## 2017-01-01 RX ADMIN — AMIODARONE HYDROCHLORIDE 0.5 MG/MIN: 1.8 INJECTION, SOLUTION INTRAVENOUS at 17:16

## 2017-01-01 RX ADMIN — PROPOFOL 20 MCG/KG/MIN: 10 INJECTION, EMULSION INTRAVENOUS at 15:27

## 2017-01-01 RX ADMIN — LORAZEPAM 4 MG: 2 INJECTION INTRAMUSCULAR; INTRAVENOUS at 02:00

## 2017-01-01 RX ADMIN — HYPROMELLOSE 2910 (4000 MPA.S) 1 DROP: 25 SOLUTION/ DROPS OPHTHALMIC at 14:00

## 2017-01-01 RX ADMIN — Medication 8 MG/HR: at 21:00

## 2017-01-01 RX ADMIN — AMIODARONE HYDROCHLORIDE 1 MG/MIN: 1.8 INJECTION, SOLUTION INTRAVENOUS at 06:00

## 2017-01-01 RX ADMIN — PIPERACILLIN SODIUM AND TAZOBACTAM SODIUM 3.38 G: 3; .375 INJECTION, POWDER, LYOPHILIZED, FOR SOLUTION INTRAVENOUS at 15:51

## 2017-01-01 RX ADMIN — LORAZEPAM 4 MG: 2 INJECTION INTRAMUSCULAR; INTRAVENOUS at 10:20

## 2017-01-01 RX ADMIN — VECURONIUM BROMIDE 0.8 MCG/KG/MIN: 1 INJECTION, POWDER, LYOPHILIZED, FOR SOLUTION INTRAVENOUS at 15:00

## 2017-01-01 RX ADMIN — INSULIN LISPRO 4 UNITS: 100 INJECTION, SOLUTION INTRAVENOUS; SUBCUTANEOUS at 00:00

## 2017-01-01 RX ADMIN — LORAZEPAM 2 MG: 2 INJECTION INTRAMUSCULAR; INTRAVENOUS at 21:08

## 2017-01-01 RX ADMIN — SODIUM CHLORIDE 40 MG: 9 INJECTION INTRAMUSCULAR; INTRAVENOUS; SUBCUTANEOUS at 08:02

## 2017-01-01 RX ADMIN — Medication 10 MG/HR: at 21:00

## 2017-01-01 RX ADMIN — VALPROATE SODIUM 250 MG: 100 INJECTION, SOLUTION INTRAVENOUS at 23:00

## 2017-01-01 RX ADMIN — PHYTONADIONE 1 MG: 10 INJECTION, EMULSION INTRAMUSCULAR; INTRAVENOUS; SUBCUTANEOUS at 23:44

## 2017-01-01 RX ADMIN — SODIUM CHLORIDE 40 MG: 9 INJECTION INTRAMUSCULAR; INTRAVENOUS; SUBCUTANEOUS at 08:49

## 2017-01-01 RX ADMIN — SODIUM CHLORIDE 40 MG: 9 INJECTION INTRAMUSCULAR; INTRAVENOUS; SUBCUTANEOUS at 08:05

## 2017-01-01 RX ADMIN — GLYCOPYRROLATE 0.2 MG: 0.2 INJECTION, SOLUTION INTRAMUSCULAR; INTRAVENOUS at 08:06

## 2017-01-01 RX ADMIN — PIPERACILLIN SODIUM AND TAZOBACTAM SODIUM 3.38 G: 3; .375 INJECTION, POWDER, LYOPHILIZED, FOR SOLUTION INTRAVENOUS at 16:59

## 2017-01-01 RX ADMIN — PROPOFOL 20 MCG/KG/MIN: 10 INJECTION, EMULSION INTRAVENOUS at 02:58

## 2017-01-01 RX ADMIN — FENTANYL CITRATE 50 MCG: 50 INJECTION INTRAMUSCULAR; INTRAVENOUS at 19:20

## 2017-01-01 RX ADMIN — PIPERACILLIN SODIUM AND TAZOBACTAM SODIUM 3.38 G: 3; .375 INJECTION, POWDER, LYOPHILIZED, FOR SOLUTION INTRAVENOUS at 04:01

## 2017-01-01 RX ADMIN — FENTANYL CITRATE 100 MCG: 50 INJECTION INTRAMUSCULAR; INTRAVENOUS at 00:00

## 2017-01-01 RX ADMIN — HYPROMELLOSE 2910 (4000 MPA.S) 1 DROP: 25 SOLUTION/ DROPS OPHTHALMIC at 02:00

## 2017-01-01 RX ADMIN — INSULIN LISPRO 2 UNITS: 100 INJECTION, SOLUTION INTRAVENOUS; SUBCUTANEOUS at 06:13

## 2017-01-01 RX ADMIN — HYPROMELLOSE 2910 (4000 MPA.S) 1 DROP: 25 SOLUTION/ DROPS OPHTHALMIC at 13:15

## 2017-01-01 RX ADMIN — VECURONIUM BROMIDE 9.71 MG: 1 INJECTION, POWDER, LYOPHILIZED, FOR SOLUTION INTRAVENOUS at 13:56

## 2017-01-01 RX ADMIN — LORAZEPAM 2 MG: 2 INJECTION INTRAMUSCULAR; INTRAVENOUS at 13:07

## 2017-01-01 RX ADMIN — LORAZEPAM 4 MG: 2 INJECTION INTRAMUSCULAR; INTRAVENOUS at 15:09

## 2017-01-01 RX ADMIN — LEVETIRACETAM 500 MG: 5 INJECTION INTRAVENOUS at 02:00

## 2017-01-01 RX ADMIN — INSULIN LISPRO 6 UNITS: 100 INJECTION, SOLUTION INTRAVENOUS; SUBCUTANEOUS at 00:08

## 2017-01-01 RX ADMIN — FENTANYL CITRATE 100 MCG: 50 INJECTION INTRAMUSCULAR; INTRAVENOUS at 04:00

## 2017-01-01 RX ADMIN — AMIODARONE HYDROCHLORIDE 1 MG/MIN: 1.8 INJECTION, SOLUTION INTRAVENOUS at 11:10

## 2017-01-01 RX ADMIN — SODIUM CHLORIDE 5.2 UNITS/HR: 900 INJECTION, SOLUTION INTRAVENOUS at 16:30

## 2017-01-01 RX ADMIN — SODIUM CHLORIDE 1000 ML: 900 INJECTION, SOLUTION INTRAVENOUS at 10:02

## 2017-01-01 RX ADMIN — AMIODARONE HYDROCHLORIDE 200 MG: 200 TABLET ORAL at 08:50

## 2017-01-01 RX ADMIN — HYPROMELLOSE 2910 (4000 MPA.S) 1 DROP: 25 SOLUTION/ DROPS OPHTHALMIC at 02:30

## 2017-01-01 RX ADMIN — INSULIN LISPRO 2 UNITS: 100 INJECTION, SOLUTION INTRAVENOUS; SUBCUTANEOUS at 11:21

## 2017-01-01 RX ADMIN — MIDAZOLAM HYDROCHLORIDE: 1 INJECTION, SOLUTION INTRAMUSCULAR; INTRAVENOUS at 21:48

## 2017-01-01 RX ADMIN — AMIODARONE HYDROCHLORIDE 0.5 MG/MIN: 1.8 INJECTION, SOLUTION INTRAVENOUS at 13:39

## 2017-01-01 RX ADMIN — HYPROMELLOSE 2910 (4000 MPA.S) 1 DROP: 25 SOLUTION/ DROPS OPHTHALMIC at 13:00

## 2017-01-01 RX ADMIN — VALPROATE SODIUM 250 MG: 100 INJECTION, SOLUTION INTRAVENOUS at 15:13

## 2017-01-01 RX ADMIN — INSULIN LISPRO 4 UNITS: 100 INJECTION, SOLUTION INTRAVENOUS; SUBCUTANEOUS at 17:10

## 2017-01-01 RX ADMIN — PIPERACILLIN SODIUM AND TAZOBACTAM SODIUM 3.38 G: 3; .375 INJECTION, POWDER, LYOPHILIZED, FOR SOLUTION INTRAVENOUS at 15:48

## 2017-01-01 RX ADMIN — INSULIN LISPRO 2 UNITS: 100 INJECTION, SOLUTION INTRAVENOUS; SUBCUTANEOUS at 06:00

## 2017-01-01 RX ADMIN — PROPOFOL 30 MCG/KG/MIN: 10 INJECTION, EMULSION INTRAVENOUS at 13:05

## 2017-01-01 RX ADMIN — INSULIN LISPRO 6 UNITS: 100 INJECTION, SOLUTION INTRAVENOUS; SUBCUTANEOUS at 17:09

## 2017-01-01 RX ADMIN — FENTANYL CITRATE 50 MCG: 50 INJECTION, SOLUTION INTRAMUSCULAR; INTRAVENOUS at 22:35

## 2017-01-01 RX ADMIN — MORPHINE SULFATE: 5 INJECTION, SOLUTION INTRAVENOUS at 08:49

## 2017-01-01 RX ADMIN — SODIUM CHLORIDE 600 MG: 900 INJECTION, SOLUTION INTRAVENOUS at 17:27

## 2017-01-01 RX ADMIN — PROPOFOL 20 MCG/KG/MIN: 10 INJECTION, EMULSION INTRAVENOUS at 08:40

## 2017-01-01 RX ADMIN — INSULIN LISPRO 2 UNITS: 100 INJECTION, SOLUTION INTRAVENOUS; SUBCUTANEOUS at 12:37

## 2017-01-01 RX ADMIN — INSULIN LISPRO 3 UNITS: 100 INJECTION, SOLUTION INTRAVENOUS; SUBCUTANEOUS at 00:00

## 2017-01-01 RX ADMIN — INSULIN LISPRO 4 UNITS: 100 INJECTION, SOLUTION INTRAVENOUS; SUBCUTANEOUS at 00:50

## 2017-01-01 RX ADMIN — Medication 8 MG/HR: at 06:00

## 2017-01-01 RX ADMIN — AMIODARONE HYDROCHLORIDE 0.5 MG/MIN: 1.8 INJECTION, SOLUTION INTRAVENOUS at 17:17

## 2017-01-01 RX ADMIN — FENTANYL CITRATE 100 MCG: 50 INJECTION INTRAMUSCULAR; INTRAVENOUS at 19:05

## 2017-01-01 RX ADMIN — SODIUM CHLORIDE 2 MG/HR: 900 INJECTION, SOLUTION INTRAVENOUS at 11:00

## 2017-01-01 RX ADMIN — LEVETIRACETAM 500 MG: 5 INJECTION INTRAVENOUS at 04:06

## 2017-01-01 RX ADMIN — SODIUM CHLORIDE 40 MG: 9 INJECTION INTRAMUSCULAR; INTRAVENOUS; SUBCUTANEOUS at 08:38

## 2017-01-01 RX ADMIN — SODIUM CHLORIDE 1000 ML: 900 INJECTION, SOLUTION INTRAVENOUS at 10:06

## 2017-01-01 RX ADMIN — INSULIN LISPRO 6 UNITS: 100 INJECTION, SOLUTION INTRAVENOUS; SUBCUTANEOUS at 11:58

## 2017-01-01 RX ADMIN — VECURONIUM BROMIDE 9 MG: 1 INJECTION, POWDER, LYOPHILIZED, FOR SOLUTION INTRAVENOUS at 12:31

## 2017-01-01 RX ADMIN — DEXTROSE MONOHYDRATE 50 ML: 25 INJECTION, SOLUTION INTRAVENOUS at 09:54

## 2017-01-01 RX ADMIN — SODIUM CHLORIDE 50 ML/HR: 900 INJECTION, SOLUTION INTRAVENOUS at 17:16

## 2017-01-01 RX ADMIN — LEVETIRACETAM 500 MG: 5 INJECTION INTRAVENOUS at 14:01

## 2017-01-01 RX ADMIN — SODIUM CHLORIDE 50 ML/HR: 900 INJECTION, SOLUTION INTRAVENOUS at 15:00

## 2017-01-01 RX ADMIN — CALCIUM CHLORIDE 1 G: 100 INJECTION, SOLUTION INTRAVENOUS at 09:53

## 2017-01-01 RX ADMIN — LEVETIRACETAM 1000 MG: 1000 INJECTION, SOLUTION INTRAVENOUS at 13:40

## 2017-01-01 RX ADMIN — LORAZEPAM 2 MG: 2 INJECTION INTRAMUSCULAR; INTRAVENOUS at 11:46

## 2017-01-01 RX ADMIN — SODIUM CHLORIDE 600 MG: 900 INJECTION, SOLUTION INTRAVENOUS at 00:29

## 2017-01-01 RX ADMIN — AMIODARONE HYDROCHLORIDE 0.5 MG/MIN: 1.8 INJECTION, SOLUTION INTRAVENOUS at 06:00

## 2017-01-01 RX ADMIN — AMIODARONE HYDROCHLORIDE 0.5 MG/MIN: 1.8 INJECTION, SOLUTION INTRAVENOUS at 04:57

## 2017-01-01 RX ADMIN — SODIUM CHLORIDE 40 MG: 9 INJECTION INTRAMUSCULAR; INTRAVENOUS; SUBCUTANEOUS at 08:40

## 2017-01-01 RX ADMIN — LEVETIRACETAM 500 MG: 5 INJECTION INTRAVENOUS at 13:00

## 2017-01-01 RX ADMIN — VECURONIUM BROMIDE 0.8 MCG/KG/MIN: 1 INJECTION, POWDER, LYOPHILIZED, FOR SOLUTION INTRAVENOUS at 14:00

## 2017-01-01 RX ADMIN — LEVETIRACETAM 1000 MG: 1000 INJECTION, SOLUTION INTRAVENOUS at 02:00

## 2017-01-01 RX ADMIN — PIPERACILLIN SODIUM AND TAZOBACTAM SODIUM 3.38 G: 3; .375 INJECTION, POWDER, LYOPHILIZED, FOR SOLUTION INTRAVENOUS at 10:23

## 2017-01-01 RX ADMIN — AMIODARONE HYDROCHLORIDE 200 MG: 200 TABLET ORAL at 17:14

## 2017-01-01 RX ADMIN — MIDAZOLAM HYDROCHLORIDE 2 MG: 1 INJECTION, SOLUTION INTRAMUSCULAR; INTRAVENOUS at 18:30

## 2017-01-01 RX ADMIN — PIPERACILLIN SODIUM AND TAZOBACTAM SODIUM 3.38 G: 3; .375 INJECTION, POWDER, LYOPHILIZED, FOR SOLUTION INTRAVENOUS at 15:24

## 2017-01-01 RX ADMIN — PIPERACILLIN SODIUM AND TAZOBACTAM SODIUM 3.38 G: 3; .375 INJECTION, POWDER, LYOPHILIZED, FOR SOLUTION INTRAVENOUS at 21:07

## 2017-01-01 RX ADMIN — FENTANYL CITRATE 100 MCG: 50 INJECTION INTRAMUSCULAR; INTRAVENOUS at 20:20

## 2017-01-01 RX ADMIN — SODIUM CHLORIDE 600 MG: 900 INJECTION, SOLUTION INTRAVENOUS at 15:15

## 2017-01-01 RX ADMIN — PROPOFOL 30 MCG/KG/MIN: 10 INJECTION, EMULSION INTRAVENOUS at 06:00

## 2017-01-01 RX ADMIN — FENTANYL CITRATE 50 MCG: 50 INJECTION INTRAMUSCULAR; INTRAVENOUS at 12:25

## 2017-01-01 RX ADMIN — VALPROATE SODIUM 250 MG: 100 INJECTION, SOLUTION INTRAVENOUS at 07:29

## 2017-01-01 RX ADMIN — LORAZEPAM 2 MG: 2 INJECTION INTRAMUSCULAR; INTRAVENOUS at 08:38

## 2017-01-01 RX ADMIN — LEVETIRACETAM 500 MG: 5 INJECTION INTRAVENOUS at 13:39

## 2017-01-01 RX ADMIN — PROPOFOL 30 MCG/KG/MIN: 10 INJECTION, EMULSION INTRAVENOUS at 23:48

## 2017-01-01 RX ADMIN — HYPROMELLOSE 2910 (4000 MPA.S) 1 DROP: 25 SOLUTION/ DROPS OPHTHALMIC at 02:16

## 2017-01-01 RX ADMIN — LORAZEPAM 2 MG: 2 INJECTION INTRAMUSCULAR; INTRAVENOUS at 20:00

## 2017-01-01 RX ADMIN — INSULIN LISPRO 6 UNITS: 100 INJECTION, SOLUTION INTRAVENOUS; SUBCUTANEOUS at 06:00

## 2017-01-01 RX ADMIN — MORPHINE SULFATE: 5 INJECTION, SOLUTION INTRAVENOUS at 23:24

## 2017-01-01 RX ADMIN — PROPOFOL 20 MCG/KG/MIN: 10 INJECTION, EMULSION INTRAVENOUS at 18:55

## 2017-01-01 RX ADMIN — PROPOFOL 20 MCG/KG/MIN: 10 INJECTION, EMULSION INTRAVENOUS at 02:09

## 2017-01-01 RX ADMIN — VALPROATE SODIUM 250 MG: 100 INJECTION, SOLUTION INTRAVENOUS at 23:23

## 2017-01-01 RX ADMIN — MAGNESIUM SULFATE IN WATER 4 G: 40 INJECTION, SOLUTION INTRAVENOUS at 10:24

## 2017-01-01 RX ADMIN — LORAZEPAM 2 MG: 2 INJECTION INTRAMUSCULAR; INTRAVENOUS at 00:57

## 2017-01-01 RX ADMIN — PIPERACILLIN SODIUM AND TAZOBACTAM SODIUM 3.38 G: 3; .375 INJECTION, POWDER, LYOPHILIZED, FOR SOLUTION INTRAVENOUS at 04:55

## 2017-01-01 RX ADMIN — PROPOFOL 15 MCG/KG/MIN: 10 INJECTION, EMULSION INTRAVENOUS at 23:00

## 2017-01-01 RX ADMIN — AMIODARONE HYDROCHLORIDE 0.5 MG/MIN: 1.8 INJECTION, SOLUTION INTRAVENOUS at 03:24

## 2017-01-01 RX ADMIN — LORAZEPAM 2 MG: 2 INJECTION INTRAMUSCULAR; INTRAVENOUS at 00:00

## 2017-01-01 RX ADMIN — HYPROMELLOSE 2910 (4000 MPA.S) 1 DROP: 25 SOLUTION/ DROPS OPHTHALMIC at 14:01

## 2017-01-01 RX ADMIN — SODIUM CHLORIDE 2000 MG: 900 INJECTION, SOLUTION INTRAVENOUS at 15:00

## 2017-01-01 RX ADMIN — AMIODARONE HYDROCHLORIDE 1 MG/MIN: 1.8 INJECTION, SOLUTION INTRAVENOUS at 00:00

## 2017-01-01 RX ADMIN — VALPROATE SODIUM 250 MG: 100 INJECTION, SOLUTION INTRAVENOUS at 14:09

## 2017-01-01 RX ADMIN — ACETAMINOPHEN 1000 MG: 650 SOLUTION ORAL at 00:56

## 2017-01-01 RX ADMIN — ACETAMINOPHEN 1000 MG: 650 SOLUTION ORAL at 17:05

## 2017-01-01 RX ADMIN — PIPERACILLIN SODIUM AND TAZOBACTAM SODIUM 3.38 G: 3; .375 INJECTION, POWDER, LYOPHILIZED, FOR SOLUTION INTRAVENOUS at 04:00

## 2017-01-01 RX ADMIN — INSULIN LISPRO 6 UNITS: 100 INJECTION, SOLUTION INTRAVENOUS; SUBCUTANEOUS at 19:00

## 2017-01-01 RX ADMIN — VALPROATE SODIUM 250 MG: 100 INJECTION, SOLUTION INTRAVENOUS at 07:38

## 2017-01-01 RX ADMIN — AMIODARONE HYDROCHLORIDE 1 MG/MIN: 1.8 INJECTION, SOLUTION INTRAVENOUS at 00:13

## 2017-01-01 RX ADMIN — SODIUM BICARBONATE 50 MEQ: 84 INJECTION, SOLUTION INTRAVENOUS at 09:54

## 2017-01-01 RX ADMIN — PHYTONADIONE 10 MG: 10 INJECTION, EMULSION INTRAMUSCULAR; INTRAVENOUS; SUBCUTANEOUS at 08:54

## 2017-01-01 RX ADMIN — INSULIN LISPRO 6 UNITS: 100 INJECTION, SOLUTION INTRAVENOUS; SUBCUTANEOUS at 18:39

## 2017-01-01 RX ADMIN — INSULIN LISPRO 9 UNITS: 100 INJECTION, SOLUTION INTRAVENOUS; SUBCUTANEOUS at 11:58

## 2017-01-01 RX ADMIN — LORAZEPAM 1 MG: 2 INJECTION INTRAMUSCULAR; INTRAVENOUS at 15:27

## 2017-01-01 RX ADMIN — PROPOFOL 15 MCG/KG/MIN: 10 INJECTION, EMULSION INTRAVENOUS at 08:16

## 2017-01-01 RX ADMIN — LEVETIRACETAM 500 MG: 5 INJECTION INTRAVENOUS at 02:58

## 2017-01-01 RX ADMIN — SODIUM CHLORIDE 600 MG: 900 INJECTION, SOLUTION INTRAVENOUS at 08:52

## 2017-01-01 RX ADMIN — SODIUM CHLORIDE 50 ML/HR: 900 INJECTION, SOLUTION INTRAVENOUS at 06:00

## 2017-01-01 RX ADMIN — LORAZEPAM 4 MG: 2 INJECTION INTRAMUSCULAR; INTRAVENOUS at 12:31

## 2017-01-01 RX ADMIN — LEVETIRACETAM 500 MG: 5 INJECTION INTRAVENOUS at 01:22

## 2017-01-01 RX ADMIN — SODIUM CHLORIDE 50 ML/HR: 900 INJECTION, SOLUTION INTRAVENOUS at 08:49

## 2017-01-01 RX ADMIN — SODIUM CHLORIDE 0.8 UNITS/HR: 900 INJECTION, SOLUTION INTRAVENOUS at 04:00

## 2017-01-01 RX ADMIN — SODIUM CHLORIDE 50 ML/HR: 900 INJECTION, SOLUTION INTRAVENOUS at 12:34

## 2017-01-01 RX ADMIN — AMIODARONE HYDROCHLORIDE 0.5 MG/MIN: 1.8 INJECTION, SOLUTION INTRAVENOUS at 05:04

## 2017-01-01 RX ADMIN — PHENOBARBITAL SODIUM 65 MG: 65 INJECTION INTRAMUSCULAR; INTRAVENOUS at 13:50

## 2017-01-01 RX ADMIN — Medication 10 MG/HR: at 16:34

## 2017-01-01 RX ADMIN — VANCOMYCIN HYDROCHLORIDE 2000 MG: 1 INJECTION, POWDER, LYOPHILIZED, FOR SOLUTION INTRAVENOUS at 16:59

## 2017-01-01 RX ADMIN — HYPROMELLOSE 2910 (4000 MPA.S) 1 DROP: 25 SOLUTION/ DROPS OPHTHALMIC at 02:03

## 2017-01-01 RX ADMIN — HYPROMELLOSE 2910 (4000 MPA.S) 1 DROP: 25 SOLUTION/ DROPS OPHTHALMIC at 14:57

## 2017-01-01 RX ADMIN — AMIODARONE HYDROCHLORIDE 300 MG: 50 INJECTION, SOLUTION INTRAVENOUS at 09:56

## 2017-01-01 RX ADMIN — MIDAZOLAM HYDROCHLORIDE 2 MG: 1 INJECTION, SOLUTION INTRAMUSCULAR; INTRAVENOUS at 11:42

## 2017-01-01 RX ADMIN — AMIODARONE HYDROCHLORIDE 0.5 MG/MIN: 1.8 INJECTION, SOLUTION INTRAVENOUS at 15:48

## 2017-01-01 RX ADMIN — INSULIN LISPRO 6 UNITS: 100 INJECTION, SOLUTION INTRAVENOUS; SUBCUTANEOUS at 18:53

## 2017-01-01 RX ADMIN — LEVETIRACETAM 500 MG: 5 INJECTION INTRAVENOUS at 02:41

## 2017-01-01 RX ADMIN — MEPERIDINE HYDROCHLORIDE 25 MG: 25 INJECTION INTRAMUSCULAR; INTRAVENOUS; SUBCUTANEOUS at 00:06

## 2017-01-01 RX ADMIN — PIPERACILLIN SODIUM AND TAZOBACTAM SODIUM 3.38 G: 3; .375 INJECTION, POWDER, LYOPHILIZED, FOR SOLUTION INTRAVENOUS at 21:00

## 2017-01-01 RX ADMIN — LORAZEPAM 4 MG: 2 INJECTION INTRAMUSCULAR; INTRAVENOUS at 22:00

## 2017-01-01 RX ADMIN — FENTANYL CITRATE 50 MCG: 50 INJECTION INTRAMUSCULAR; INTRAVENOUS at 05:13

## 2017-01-01 RX ADMIN — LEVETIRACETAM 500 MG: 5 INJECTION INTRAVENOUS at 02:06

## 2017-01-01 RX ADMIN — AMIODARONE HYDROCHLORIDE 0.5 MG/MIN: 1.8 INJECTION, SOLUTION INTRAVENOUS at 15:18

## 2017-01-01 RX ADMIN — AMIODARONE HYDROCHLORIDE 1 MG/MIN: 1.8 INJECTION, SOLUTION INTRAVENOUS at 10:13

## 2017-01-01 RX ADMIN — SODIUM CHLORIDE 7.6 UNITS/HR: 900 INJECTION, SOLUTION INTRAVENOUS at 21:10

## 2017-01-01 RX ADMIN — EPINEPHRINE 1 MG: 0.1 INJECTION, SOLUTION ENDOTRACHEAL; INTRACARDIAC; INTRAVENOUS at 09:52

## 2017-01-01 RX ADMIN — LEVETIRACETAM 500 MG: 5 INJECTION INTRAVENOUS at 03:00

## 2017-01-01 RX ADMIN — SODIUM CHLORIDE 50 ML/HR: 900 INJECTION, SOLUTION INTRAVENOUS at 13:17

## 2017-01-01 RX ADMIN — MIDAZOLAM HYDROCHLORIDE 5 MG: 1 INJECTION, SOLUTION INTRAMUSCULAR; INTRAVENOUS at 21:48

## 2017-01-01 RX ADMIN — Medication 50 MCG/HR: at 00:14

## 2017-01-01 RX ADMIN — ACETAMINOPHEN 1000 MG: 650 SOLUTION ORAL at 17:28

## 2017-01-01 RX ADMIN — Medication 4 MG: at 14:05

## 2017-01-01 RX ADMIN — Medication 2 MG/HR: at 22:37

## 2017-01-01 RX ADMIN — LORAZEPAM 2 MG: 2 INJECTION INTRAMUSCULAR; INTRAVENOUS at 02:00

## 2017-01-01 RX ADMIN — LEVETIRACETAM 500 MG: 5 INJECTION INTRAVENOUS at 13:14

## 2017-01-01 RX ADMIN — LORAZEPAM 4 MG: 2 INJECTION INTRAMUSCULAR; INTRAVENOUS at 23:00

## 2017-01-01 RX ADMIN — HYPROMELLOSE 2910 (4000 MPA.S) 1 DROP: 25 SOLUTION/ DROPS OPHTHALMIC at 13:32

## 2017-01-01 RX ADMIN — DEXTROSE MONOHYDRATE 12.5 G: 25 INJECTION, SOLUTION INTRAVENOUS at 13:14

## 2017-01-01 RX ADMIN — LEVETIRACETAM 1000 MG: 1000 INJECTION, SOLUTION INTRAVENOUS at 15:13

## 2017-01-01 RX ADMIN — LORAZEPAM 2 MG: 2 INJECTION INTRAMUSCULAR; INTRAVENOUS at 04:00

## 2017-01-01 RX ADMIN — CALCIUM GLUCONATE 1 G: 94 INJECTION, SOLUTION INTRAVENOUS at 09:27

## 2017-01-01 RX ADMIN — AMIODARONE HYDROCHLORIDE 150 MG: 1.5 INJECTION, SOLUTION INTRAVENOUS at 13:08

## 2017-01-01 RX ADMIN — INSULIN LISPRO 6 UNITS: 100 INJECTION, SOLUTION INTRAVENOUS; SUBCUTANEOUS at 06:42

## 2017-01-01 RX ADMIN — AMIODARONE HYDROCHLORIDE 0.5 MG/MIN: 1.8 INJECTION, SOLUTION INTRAVENOUS at 16:42

## 2017-01-01 RX ADMIN — BUSPIRONE HYDROCHLORIDE 20 MG: 5 TABLET ORAL at 00:06

## 2017-01-01 RX ADMIN — POTASSIUM PHOSPHATE, MONOBASIC AND POTASSIUM PHOSPHATE, DIBASIC: 224; 236 INJECTION, SOLUTION INTRAVENOUS at 00:41

## 2017-01-01 RX ADMIN — LORAZEPAM 2 MG: 2 INJECTION INTRAMUSCULAR; INTRAVENOUS at 06:00

## 2017-01-01 RX ADMIN — LORAZEPAM 4 MG: 2 INJECTION INTRAMUSCULAR; INTRAVENOUS at 05:36

## 2017-01-01 RX ADMIN — LORAZEPAM 4 MG: 2 INJECTION INTRAMUSCULAR; INTRAVENOUS at 20:00

## 2017-01-01 RX ADMIN — FENTANYL CITRATE 50 MCG: 50 INJECTION INTRAMUSCULAR; INTRAVENOUS at 16:29

## 2017-01-01 RX ADMIN — SODIUM CHLORIDE 40 MG: 9 INJECTION INTRAMUSCULAR; INTRAVENOUS; SUBCUTANEOUS at 08:54

## 2017-01-01 RX ADMIN — PIPERACILLIN SODIUM AND TAZOBACTAM SODIUM 3.38 G: 3; .375 INJECTION, POWDER, LYOPHILIZED, FOR SOLUTION INTRAVENOUS at 10:20

## 2017-01-01 RX ADMIN — PIPERACILLIN SODIUM AND TAZOBACTAM SODIUM 3.38 G: 3; .375 INJECTION, POWDER, LYOPHILIZED, FOR SOLUTION INTRAVENOUS at 22:05

## 2017-01-01 RX ADMIN — FENTANYL CITRATE 100 MCG: 50 INJECTION INTRAMUSCULAR; INTRAVENOUS at 23:00

## 2017-01-01 RX ADMIN — PIPERACILLIN SODIUM AND TAZOBACTAM SODIUM 3.38 G: 3; .375 INJECTION, POWDER, LYOPHILIZED, FOR SOLUTION INTRAVENOUS at 09:00

## 2017-01-01 RX ADMIN — LEVETIRACETAM 500 MG: 5 INJECTION INTRAVENOUS at 13:28

## 2017-01-01 RX ADMIN — AMIODARONE HYDROCHLORIDE 200 MG: 200 TABLET ORAL at 18:57

## 2017-01-01 RX ADMIN — LORAZEPAM 2 MG: 2 INJECTION INTRAMUSCULAR; INTRAVENOUS at 22:00

## 2017-01-01 RX ADMIN — Medication 10 MG/HR: at 02:04

## 2017-01-01 RX ADMIN — LEVETIRACETAM 500 MG: 5 INJECTION INTRAVENOUS at 14:10

## 2017-01-01 RX ADMIN — HYPROMELLOSE 2910 (4000 MPA.S) 1 DROP: 25 SOLUTION/ DROPS OPHTHALMIC at 18:04

## 2017-01-01 RX ADMIN — SODIUM CHLORIDE 40 MG: 9 INJECTION INTRAMUSCULAR; INTRAVENOUS; SUBCUTANEOUS at 08:24

## 2017-01-01 RX ADMIN — Medication 10 MG/HR: at 15:13

## 2017-01-01 RX ADMIN — LORAZEPAM 4 MG: 2 INJECTION INTRAMUSCULAR; INTRAVENOUS at 00:09

## 2017-01-01 RX ADMIN — SODIUM CHLORIDE 1.2 UNITS/HR: 900 INJECTION, SOLUTION INTRAVENOUS at 22:00

## 2017-01-01 RX ADMIN — SODIUM CHLORIDE 50 ML/HR: 900 INJECTION, SOLUTION INTRAVENOUS at 15:48

## 2017-01-01 RX ADMIN — LORAZEPAM 4 MG: 2 INJECTION INTRAMUSCULAR; INTRAVENOUS at 16:13

## 2017-01-01 RX ADMIN — LORAZEPAM 4 MG: 2 INJECTION INTRAMUSCULAR; INTRAVENOUS at 16:58

## 2017-01-01 RX ADMIN — PERFLUTREN 2 ML: 6.52 INJECTION, SUSPENSION INTRAVENOUS at 15:46

## 2017-01-01 RX ADMIN — SODIUM CHLORIDE 0.2 UNITS/HR: 900 INJECTION, SOLUTION INTRAVENOUS at 06:00

## 2017-01-01 RX ADMIN — Medication 8 MG/HR: at 22:11

## 2017-07-19 PROBLEM — I46.9 CARDIAC ARREST (HCC): Status: ACTIVE | Noted: 2017-01-01

## 2017-07-19 NOTE — PROCEDURES
[facility]  *** FINAL REPORT ***    Name: Yolanda Shaw  MRN: [id number]  : 1950  HIS Order #: [id number]  [address] [facility] Visit #: [id number]  Date: 2017    TYPE OF TEST: Peripheral Venous Testing    REASON FOR TEST  Cardiac Arrest    Right Leg:-  Deep venous thrombosis:           No  Superficial venous thrombosis:    No  Deep venous insufficiency:        Not examined  Superficial venous insufficiency: Not examined    Left Leg:-  Deep venous thrombosis:           No  Superficial venous thrombosis:    No  Deep venous insufficiency:        Not examined  Superficial venous insufficiency: Not examined      INTERPRETATION/FINDINGS  Duplex images were obtained using 2-D gray scale, color flow, and  spectral Doppler analysis. Right leg :  1. No evidence of deep venous thrombosis detected in the veins  visualized. 2. Deep vein(s) visualized include the common femoral, femoral,  popliteal, posterior tibial, and peroneal veins. 3. No evidence of superficial thrombosis detected. 4. Superficial vein(s) visualized include the great saphenous vein at  the sapheno-femoral junction. Left leg :  1. No evidence of deep venous thrombosis detected in the veins  visualized. 2. Deep vein(s) visualized include the common femoral, femoral,  popliteal, posterior tibial, and peroneal veins. 3. No evidence of superficial thrombosis detected. 4. Superficial vein(s) visualized include the great saphenous vein at  the sapheno-femoral junction. ADDITIONAL COMMENTS  Technically difficult exam due to patient's inability to position legs   properly. Patient also convulsing throughout exam.    I have personally reviewed the data relevant to the interpretation of  this  study. TECHNOLOGIST: Rekha Lozano RVT  Signed: 2017 01:41 PM    PHYSICIAN: Ti Henderson MD  Signed: 2017 02:57 PM

## 2017-07-19 NOTE — ED NOTES
Et tub eplaced by DR Josiane Velez, 7.0 tube, 23 at gum.  Color metric change +., + lung sounds all field,

## 2017-07-19 NOTE — PROCEDURES
[facility]  [facility] [place] [facility] Procedure Note with US guidance    Indication: Hypothermia Protocol    Risks, benefits, alternatives explained and consent obtained from Shakira Jean Baptiste, pt's brother. Full sterile barrier precautions used. Sterility Protocol followed. (cap, mask sterile gown, sterile gloves, large sterile sheet, hand hygiene, 2% chlorhexidine for cutaneous antisepsis, sterile probe cover). Local anesthetic with Lidocaine 1% - about 3 cc. Using ultrasound guidance, 7 Fr triple lumen cath placed and secured at 16 cm.  right femoral vein cannulated x 1 attempt(s) utilizing the Seldinger technique. Position of wire confirmed in vein using US before dilating. Wire was removed. No immediate complications. Good blood return, carefully flushed. Catheter secured & Biopatch applied. Sterile Tegaderm placed.         Patricia Najera PA-C

## 2017-07-19 NOTE — CONSULTS
[facility]  Pulmonary, Critical Care, and Sleep Medicine    Name: Chanda Grigsby MRN: [id number]   : 1950 Hospital: [facility]   Date: 2017        Critical Care Initial Patient Consult      IMPRESSION:   · S/p arrest in pt with history of VT/VF, ROSC obtained in ED, downtime at least 8 minutes but likely more as pt was in POV  · History of VT with AICD  · Chronic systolic and diastolic heart failure. 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     Patient Active Problem List   Diagnosis Code    Hyperlipidemia E78.5    DM type 2 (diabetes mellitus, type 2) ([facility] E11.9    Essential hypertension, benign I10    Ventricular tachycardia (HCC) I47.2    Congestive heart failure (HCC) I50.9    Other and unspecified angina pectoris I20.9    Chronic kidney disease, stage III (moderate) N18.3    Type 2 diabetes mellitus without complication ([facility]) E81.4    SBO (small bowel obstruction) ([facility]) K56.69    Type 2 diabetes mellitus with stage 2 chronic kidney disease (HCC) E11.22, N18.2    Generalized ischemic myocardial dysfunction I25.5    Automatic implantable cardioverter-defibrillator in situ Z95.810    Hypertension I10    Diabetes mellitus type 1A ([facility] E10.9    Atrial paroxysmal tachycardia (HCC) I47.1    CKD (chronic kidney disease) stage 3, GFR 30-59 ml/min N18.3    Diabetes ([facility]) W84.0    Systolic heart failure (HCC) I50.20    AICD (automatic cardioverter/defibrillator) present Z95.810    Ischemic cardiomyopathy I25.5    MI (myocardial infarction) ([facility] I21.3    Apical mural thrombus [id number]    Cardiac arrest ([facility]) I46.9        RECOMMENDATIONS:   · ICU admission  · Vent support with VAP bundle  · Elevated INR due to Warfarin   · Initiate therapeutic hypothermia per protocol.  Sedation and NMB per orders  · Glycemic monitoring and control  · Echo requested to evaluate mural thrombus  · Duplex IJ for possible thrombus left IJ  · Serial Troponin and EKG  · No enteral feedings while on TH  · Prophylaxis issues addressed  · Discussed with RT, nursing  · Brother updated on condition     Subjective/History: This patient has been seen and evaluated at the request of Dr. Adela Whaley for post arrest management. 07/19/17    Patient is a 74 y.o. male who was brought to ED by his brother after complaining that his AICD was \"shocking him too much\". Pt was outside trimming hedges when he sat down abruptly but did not complain of chest pain or palpitations. Pt told his brother not to call EMS and just requested transport in POV. On the was to the hospital pt was noted to be slumped in the seat at least 5 minutes prior to arrival to ED where he was unresponsive and pulseless. Initial rhythm was VT and ROSC was achieved. The patient is critically ill and can not provide additional history due to Unconsciousness.      Past Medical History:   Diagnosis Date    AICD (automatic cardioverter/defibrillator) present     Aneurysm of left ventricle of heart     with vt    Apical mural thrombus (HCC)     CKD (chronic kidney disease) stage 3, GFR 30-59 ml/min     Diabetes ([address]     IDDM    Essential hypertension, benign     Ischemic cardiomyopathy     MI (myocardial infarction) ([address]     Mixed hyperlipidemia     Other and unspecified angina pectoris ([address]     Paroxysmal ventricular tachycardia ([address]     SBO (small bowel obstruction) ([address]     Systolic heart failure ([address]       Past Surgical History:   Procedure Laterality Date    HX CATARACT REMOVAL      HX COLECTOMY  7/2003    Laparoscopic; villous adenoma    HX COLONOSCOPY  12/2015; 2010    diverticula; h/o polyps    HX HEMORRHOIDECTOMY      HX HERNIA REPAIR  35/7587    DEISY/Umbilical Hernia Repair    HX PACEMAKER  1999    ICD, 1999 with most recent pulse generator replacement in 2010      Prior to Admission medications Medication Sig Start Date End Date Taking? Authorizing Provider   mexiletine (MEXITIL) 150 mg capsule Take 1 Cap by mouth three (3) times daily. 12/6/16, QTY #90, Thirty Day Supply by Dr. Jose Miguel Carver 12/6/16   Wally Merritt MD   spironolactone (ALDACTONE) 25 mg tablet Take 1 Tab by mouth daily. 12/9/16, QTY #30, [de-identified] Day Supply by Dr. Frederic Alexander. 12/9/16   Wally Merritt MD   warfarin (COUMADIN) 5 mg tablet Take 5 mg by mouth daily. Wally Merritt MD   amiodarone (CORDARONE) 200 mg tablet Take 100 mg by mouth two (2) times a day. 6/24/16   Wally Merritt MD   amLODIPine (NORVASC) 10 mg tablet Take 10 mg by mouth daily. Wally Merritt MD   enalapril (VASOTEC) 20 mg tablet Take 40 mg by mouth daily. Wally Merritt MD   glipiZIDE SR (GLUCOTROL XL) 10 mg CR tablet Take 10 mg by mouth daily. Wally Merritt MD   metoprolol succinate (TOPROL XL) 200 mg XL tablet Take 200 mg by mouth daily. Wally Merritt MD   aspirin 81 mg chewable tablet Take 81 mg by mouth daily. Wally Merritt MD   sildenafil citrate (VIAGRA) 100 mg tablet 1/2 or 1 po 1 hr prior to sex 8/9/16   Brandon Jordan MD   ferrous sulfate 325 mg (65 mg iron) tablet Take 1 Tab by mouth Daily (before breakfast). 7/21/16   Brandon Jordan MD   rosuvastatin (CRESTOR) 20 mg tablet Take 1 Tab by mouth nightly.  6/23/16   Brandon Jordan MD   insulin NPH (NOVOLIN N, HUMULIN N) 100 unit/mL injection 35 units qam; 25 qpm  Patient taking differently: 25 units qam; 25 qpm 12/11/15   Brandon Jordan MD     Current Facility-Administered Medications   Medication Dose Route Frequency    amiodarone (NEXTERONE) 360 mg in dextrose 200 mL (1.8 mg/mL) infusion  0.5-1 mg/min IntraVENous TITRATE    propofol (DIPRIVAN) infusion  5-50 mcg/kg/min IntraVENous TITRATE    0.9% sodium chloride infusion  50 mL/hr IntraVENous CONTINUOUS    vecuronium (NORCURON) 100 mg in 0.9% sodium chloride 100 mL infusion  0.8-1.2 mcg/kg/min IntraVENous TITRATE    hydroxypropyl methylcellulose (GONAK) 2.5 % ophthalmic solution 1 Drop  1 Drop Both Eyes Q12H    levETIRAcetam (KEPPRA) 2,000 mg in 0.9% sodium chloride IVPB  2,000 mg IntraVENous ONCE    perflutren lipid microspheres (DEFINITY) contrast injection 2 mL  2 mL IntraVENous ONCE     No Known Allergies   Social History   Substance Use Topics    Smoking status: Former Smoker     Quit date: 1/1/1985    Smokeless tobacco: Never Used    Alcohol use No      Family History   Problem Relation Age of Onset    Heart Disease Mother     Cancer Father      lung cancer    Cancer Sister     Heart Disease Brother         Review of Systems:  Review of systems not obtained due to patient factors. Objective:   Vital Signs:    Visit Vitals    /69    Pulse 60    Resp 30    Wt 97.1 kg (214 lb)    SpO2 100%    BMI 28.23 kg/m2               No data recorded. Intake/Output:   Last shift:         Last 3 shifts:    No intake or output data in the 24 hours ending 07/19/17 1514      Ventilator Settings:  Mode Rate Tidal Volume Pressure FiO2 PEEP   Assist control, Pressure control   450 ml    40 % 5 cm H20     Peak airway pressure: 20 cm H2O    Minute ventilation: 22 l/min      ARDS network Guidelines:   Lung protective strategy and Plateau  Pressure goal < 30 cm H2O goals  Oxygenation Goals PaO2 55-80 mm Hg or SaO2 88-95%  PH goal 7.30-7.45    VAP bundle;  Reviewed. [place] tube to suction at 20-30 cm Hg. Maintain [place] tube with 5-10ml air every 4 hours  Routine oral care every 4 hours  Elevation of head > 45 degree  Daily sedation holiday and SBT evaluation starting at 6.00am.    Physical Exam: ETT   OGT   Lines    Devices     Drains  S[place]     General:  Unresponsive, no distress. Head:  Normocephalic, without obvious abnormality, atraumatic. Eyes:  Conjunctivae/corneas clear. PERRL,   Nose: Nares normal.  Mucosa normal. No drainage or sinus tenderness.    Throat: Lips, mucosa, and tongue normal.    Neck: Supple, symmetrical, trachea midline, no adenopathy, thyroid: no enlargment/tenderness/nodules    Back:   Symmetric    Lungs:   Clear to auscultation bilaterally. Chest wall:  No tenderness or deformity. Heart:  Regular rate and rhythm, S1, S2 normal, no murmur, click, rub or gallop. Abdomen:   Soft, non-tender. Bowel sounds normal. No masses,  No organomegaly. Extremities: Extremities normal, atraumatic, no cyanosis, trace edema   Pulses: 2+ and symmetric all extremities. Skin: Skin color, texture, turgor normal. No rashes or lesions   Lymph nodes:      Cervical  nodes normal.   Neurologic: Unresponsive          Data:     Recent Results (from the past 24 hour(s))   CARDIAC PANEL,(CK, CKMB & TROPONIN)    Collection Time: 07/19/17  9:51 AM   Result Value Ref Range     39 - 308 U/L    CK - MB 2.5 <3.6 ng/ml    CK-MB Index 0.9 0.0 - 4.0 %    Troponin-I, Qt. 0.06 (H) 0.0 - 0.045 NG/ML   CBC WITH AUTOMATED DIFF    Collection Time: 07/19/17  9:51 AM   Result Value Ref Range    WBC 9.6 4.6 - 13.2 K/uL    RBC 4.38 (L) 4.70 - 5.50 M/uL    HGB 11.8 (L) 13.0 - 16.0 g/dL    HCT 37.3 36.0 - 48.0 %    MCV 85.2 74.0 - 97.0 FL    MCH 26.9 24.0 - 34.0 PG    MCHC 31.6 31.0 - 37.0 g/dL    RDW 13.8 11.6 - 14.5 %    PLATELET 286 483 - 606 K/uL    MPV 9.9 9.2 - 11.8 FL    NEUTROPHILS 49 42 - 75 %    BAND NEUTROPHILS 1 0 - 5 %    LYMPHOCYTES 32 20 - 51 %    MONOCYTES 14 (H) 2 - 9 %    EOSINOPHILS 3 0 - 5 %    BASOPHILS 1 0 - 3 %    ABS. NEUTROPHILS 4.8 1.8 - 8.0 K/UL    ABS. LYMPHOCYTES 3.1 0.8 - 3.5 K/UL    ABS. MONOCYTES 1.3 (H) 0 - 1.0 K/UL    ABS. EOSINOPHILS 0.3 0.0 - 0.4 K/UL    ABS.  BASOPHILS 0.1 (H) 0.0 - 0.06 K/UL    DF MANUAL      PLATELET COMMENTS ADEQUATE PLATELETS      RBC COMMENTS OVALOCYTES  1+       METABOLIC PANEL, COMPREHENSIVE    Collection Time: 07/19/17  9:51 AM   Result Value Ref Range    Sodium 139 136 - 145 mmol/L    Potassium 3.8 3.5 - 5.5 mmol/L    Chloride 106 100 - 108 mmol/L    CO2 21 21 - 32 mmol/L    Anion gap 12 3.0 - 18 mmol/L Glucose 258 (H) 74 - 99 mg/dL    BUN 21 (H) 7.0 - 18 MG/DL    Creatinine 2.36 (H) 0.6 - 1.3 MG/DL    BUN/Creatinine ratio 9 (L) 12 - 20      GFR est AA 34 (L) >60 ml/min/1.73m2    GFR est non-AA 28 (L) >60 ml/min/1.73m2    Calcium 8.4 (L) 8.5 - 10.1 MG/DL    Bilirubin, total 0.2 0.2 - 1.0 MG/DL    ALT (SGPT) 242 (H) 16 - 61 U/L    AST (SGOT) 268 (H) 15 - 37 U/L    Alk.  phosphatase 92 45 - 117 U/L    Protein, total 7.5 6.4 - 8.2 g/dL    Albumin 3.5 3.4 - 5.0 g/dL    Globulin 4.0 2.0 - 4.0 g/dL    A-G Ratio 0.9 0.8 - 1.7     PTT    Collection Time: 07/19/17  9:51 AM   Result Value Ref Range    aPTT 35.4 23.0 - 36.4 SEC   PROTHROMBIN TIME + INR    Collection Time: 07/19/17  9:51 AM   Result Value Ref Range    Prothrombin time 27.1 (H) 11.5 - 15.2 sec    INR 2.7 (H) 0.8 - 1.2     NT-PRO BNP    Collection Time: 07/19/17  9:51 AM   Result Value Ref Range    NT pro- 0 - 900 PG/ML   MAGNESIUM    Collection Time: 07/19/17  9:51 AM   Result Value Ref Range    Magnesium 2.1 1.6 - 2.6 mg/dL   D DIMER    Collection Time: 07/19/17  9:51 AM   Result Value Ref Range    D DIMER 0.98 (H) <0.46 ug/ml(FEU)   EKG, 12 LEAD, INITIAL    Collection Time: 07/19/17 10:04 AM   Result Value Ref Range    Ventricular Rate 79 BPM    Atrial Rate 79 BPM    QRS Duration 192 ms    Q-T Interval 442 ms    QTC Calculation (Bezet) 506 ms    Calculated R Axis -90 degrees    Calculated T Axis 89 degrees    Diagnosis       Demand pacemaker, interpretation is based on intrinsic rhythm  Wide QRS rhythm with fusion complexes  Left axis deviation    Confirmed by Cristopher Varghese MD, Blanca Landers ([id number]) on 7/19/2017 10:50:24 AM     DRUG SCREEN, URINE    Collection Time: 07/19/17 10:20 AM   Result Value Ref Range    BENZODIAZEPINE NEGATIVE  NEG      BARBITURATES NEGATIVE  NEG      THC (TH-CANNABINOL) NEGATIVE  NEG      OPIATES NEGATIVE  NEG      PCP(PHENCYCLIDINE) NEGATIVE  NEG      COCAINE NEGATIVE  NEG      AMPHETAMINES NEGATIVE  NEG      METHADONE NEGATIVE  NEG HDSCOM (NOTE)    POC G3    Collection Time: 07/19/17 10:54 AM   Result Value Ref Range    Device: VENT      FIO2 (POC) 100 %    pH (POC) 7.243 (LL) 7.35 - 7.45      pCO2 (POC) 40.3 35.0 - 45.0 MMHG    pO2 (POC) 413 (H) 80 - 100 MMHG    HCO3 (POC) 17.4 (L) 22 - 26 MMOL/L    sO2 (POC) 100 (H) 92 - 97 %    Base deficit (POC) 9 mmol/L    Mode ASSIST CONTROL      Tidal volume 450 ml    Set Rate 16 bpm    PEEP/CPAP (POC) 5 cmH2O    PIP (POC) 18      Allens test (POC) N/A      Inspiratory Time 0.90 sec    Total resp. rate 16      Site RIGHT RADIAL      Specimen type (POC) ARTERIAL      Performed by Loida Egan     Volume control plus YES     EKG, 12 LEAD, SUBSEQUENT    Collection Time: 07/19/17 12:09 PM   Result Value Ref Range    Ventricular Rate 60 BPM    Atrial Rate 258 BPM    QRS Duration 116 ms    Q-T Interval 436 ms    QTC Calculation (Bezet) 436 ms    Calculated R Axis 108 degrees    Calculated T Axis -74 degrees    Diagnosis       AV sequential or dual chamber electronic pacemaker  When compared with ECG of 19-JUL-2017 10:04,  Electronic ventricular pacemaker has replaced Wide QRS rhythm  Confirmed by Elly Briceño MD, Ronni Gutierrez ([id number]) on 7/19/2017 2:59:44 PM             Recent Labs      07/19/17   1054   FIO2I  100   HCO3I  17.4*   PCO2I  40.3   PHI  7.243*   PO2I  413*     Telemetry: paced     Imaging:  I have personally reviewed the patients radiographs and have reviewed the reports:  CXR Results  (Last 48 hours)               07/19/17 1033  XR CHEST PORT Final result    Impression:  IMPRESSION:       Support tubes in expected position. No pneumothorax. Stable mild cardiomegaly. Atherosclerosis. Narrative:  CHEST ONE VIEW portable 1026 hours       CPT CODE: [id number]       HISTORY: Unresponsive on arrival with no pulse, following pacemaker firing,   return to spontaneous circulation following CPR. COMPARISON: Chest x-ray January 27, 2016. FINDINGS:        Single view obtained.  Endotracheal tube terminates 5.6 cm proximal to the   susana. The esophagogastric tube enters the stomach and extends beyond off the   film. Right atrial and right ventricular leads of the left upper chest AICD in   stable position. The cardiac silhouette is mildly enlarged. There is tortuosity   of the aorta with calcified plaque. The lungs are clear. Pulmonary vascularity   is normal. The costophrenic angles are sharply defined. No bony abnormalities   are seen. Best practice :    Glycemic control  IHI ICU bundles:    MVentilated patients- VAP bundle , aim to keep peak plateau pressure 53-21RP H2O  Sress ulcer prophylaxis  DVT prophylaxis  Need for Lines, partida assessed  Palliative care consult       Central Line Bundle Followed , Partida Bundle Followed and Vent Bundle Followed, Vent Day 1       Total of 44    min critical care time spent at bedside during the course of care providing evaluation,management and care decisions and ordering appropriate treatment related to critical care problems exclusive of procedures. The reason for providing this level of medical care for this critically ill patient was due a critical illness that impaired one or more vital organ systems such that there was a high probability of imminent or life threatening deterioration in the patients condition. This care involved high complexity decision making to assess, manipulate, and support vital system functions, to treat this degree vital organ system failure and to prevent further life threatening deterioration of the patients condition.     Clara Mary MD

## 2017-07-19 NOTE — PROGRESS NOTES
Completed Echocardiogram with Definity at bedside. Report to follow.      Aaliyah Antonio, RCS, RDCS

## 2017-07-19 NOTE — PROCEDURES
[facility]  Arterial  Line Procedure Note    Indication: Hypothermia Therapy Protocol    Risks, benefits, alternatives explained and consent obtained. Line Bundle:   Full sterile barrier precautions used. 7-Step Sterility Protocol followed. (cap, mask sterile gown, sterile gloves, large sterile sheet, hand hygiene, 2% chlorhexidine for cutaneous antisepsis)  5 mL 1% Lidocaine placed at insertion site. Right cannulated x 1 attempt(s) utilizing the modified Seldinger technique. Good blood return. Catheter secured & Biopatch applied. Sterile Tegaderm placed.       Procedure supervised by Dr. Kelly Ricks, NP  07/19/17

## 2017-07-19 NOTE — PROGRESS NOTES
responded to Code for  Naresh Santamaria, who is a 74 y. o.,male,     The  provided the following Interventions:  Provided crisis pastoral care and pastoral support. Offered prayers on behalf for the patient. Chart reviewed. The following outcomes were achieved:  Patient was successfully resuscitated. To be moved to 310 of ICU. Assessment:  Provided support for the family including the brother, Jason Mcdonald. Plan:  Chaplains will continue to follow and will provide pastoral care on an as needed/requested basis.  recommends bedside caregivers page  on duty if patient shows signs of acute spiritual or emotional distress.        Wen Diadema 1903 [phone]

## 2017-07-19 NOTE — ED PROVIDER NOTES
HPI Comments: 9:45 AM:   Marilu Gutierrez is a 74 y.o. Male with PMHx of Systolic heart failure, MI, and DM presents to the ED on wheelchair with brother, pt is unrespondsive on arrival. Brother states pt's \"pacemaker was going off and shocking him\" and pt became unresponsive in the car. Per brother, pt had pacemaker checked 1 week ago and was working properly. Pt's brother states patient complained of chest pain and being shocked. Brother states otherwise had been in his previous state of health as far as he knew. HPI limited due to pt unresponsiveness. 9:47 AM: DONTE ACEVEDO was called in ED       Past Medical History:   Diagnosis Date    AICD (automatic cardioverter/defibrillator) present     Aneurysm of left ventricle of heart     with vt    Apical mural thrombus (HCC)     CKD (chronic kidney disease) stage 3, GFR 30-59 ml/min     Diabetes (HCC)     IDDM    Essential hypertension, benign     Ischemic cardiomyopathy     MI (myocardial infarction) ([address]     Mixed hyperlipidemia     Other and unspecified angina pectoris ([address]     Paroxysmal ventricular tachycardia (HCC)     SBO (small bowel obstruction) ([address]     Systolic heart failure ([address]        Past Surgical History:   Procedure Laterality Date    HX CATARACT REMOVAL      HX COLECTOMY  7/2003    Laparoscopic; villous adenoma    HX COLONOSCOPY  12/2015; 2010    diverticula; h/o polyps    HX HEMORRHOIDECTOMY      HX HERNIA REPAIR  64/2873    DEISY/Umbilical Hernia Repair    HX PACEMAKER  1999    ICD, 1999 with most recent pulse generator replacement in 2010         Family History:   Problem Relation Age of Onset    Heart Disease Mother     Cancer Father      lung cancer    Cancer Sister     Heart Disease Brother        Social History     Social History    Marital status: SINGLE     Spouse name: N/A    Number of children: N/A    Years of education: N/A     Occupational History    Not on file.      Social History Main Topics    Smoking status: Former Smoker     Quit date: 1/1/1985    Smokeless tobacco: Never Used    Alcohol use No    Drug use: No    Sexual activity: Not Currently     Partners: Female     Other Topics Concern    Not on file     Social History Narrative         ALLERGIES: Review of patient's allergies indicates no known allergies. Review of Systems   Unable to perform ROS: Patient unresponsive       Vitals:    07/19/17 1015 07/19/17 1016 07/19/17 1245 07/19/17 1249   BP: 94/62 91/59 138/69    Pulse: 67 60 60 60   Resp: 23 24 24 30   SpO2: 100% 100% 96% 100%   Weight:                Physical Exam   HENT:   Head: Atraumatic. Eyes: Conjunctivae are normal.   Pupils 2mm bilaterally, reactive. Neck: Neck supple. No JVD present. Pulmonary/Chest:   No spontaneous respirations   Abdominal: Soft. Bowel sounds are normal. He exhibits no distension. Musculoskeletal: He exhibits no edema or deformity. Neurological: GCS eye subscore is 1. GCS verbal subscore is 1. GCS motor subscore is 1. Skin: Skin is warm and dry. Nursing note and vitals reviewed. MDM  Number of Diagnoses or Management Options  Cardiac arrest [facility]): Hyperglycemia:   Renal insufficiency:   Ventricular tachycardia [facility]):   Diagnosis management comments: Tai Milligan is a 74 y.o. male presenting unresponsive, code blue called and code ran per ACLS protocol. Please see nurses note for details and doses. Initial rhytm VT, ROSC achieved. Pt intubated for airway protection, amiodarone drip started and family updated, questions answered.         Amount and/or Complexity of Data Reviewed  Clinical lab tests: ordered and reviewed  Tests in the radiology section of CPT®: ordered and reviewed (CXR. CT head)  Tests in the medicine section of CPT®: ordered and reviewed (EKG)    Critical Care  Total time providing critical care: 30-74 minutes    ED Course       Intubation  Date/Time: 7/19/2017 10:15 AM  Performed by: Nette Hay  Authorized by: Nette Hay Consent:     Consent obtained:  Emergent situation  Pre-procedure details:     Patient status:  Unresponsive    Paralytics:  None  Procedure details:     Preoxygenation:  Bag valve mask    CPR in progress: no      Intubation method:  Oral    Oral intubation technique:  Video-assisted    Laryngoscope blade: Mac 3    Tube size (mm):  7.0    Tube type:  Cuffed    Number of attempts:  1    Cricoid pressure: no      Tube visualized through cords: yes    Placement assessment:     ETT to teeth:  23cm to gums    Tube secured with:  ETT lala    Breath sounds:  Equal and absent over the epigastrium    Placement verification: chest rise, CXR verification, equal breath sounds and ETCO2 detector      CXR findings:  ETT in proper place  Post-procedure details:     Patient tolerance of procedure: Tolerated well, no immediate complications        Vitals:  Patient Vitals for the past 12 hrs:   Pulse Resp BP SpO2   07/19/17 1249 60 30 - 100 %   07/19/17 1245 60 24 138/69 96 %   07/19/17 1016 60 24 91/59 100 %   07/19/17 1015 67 23 94/62 100 %   07/19/17 1014 60 20 - 100 %   07/19/17 1009 78 14 - 100 %   07/19/17 1000 60 18 (!) 134/115 -   07/19/17 0956 81 17 138/77 -   07/19/17 0953 (!) 117 (!) 38 (!) 175/95 100 %           EKG interpretation by ED Physician:  10:05 AM: Paced rhythm. Rate: 79 bpm.     X-Ray, CT or other radiology findings or impressions:  Ct Head Wo Cont    Result Date: 7/19/2017  EXAM:  CT HEAD WO CONT INDICATION:   cardiac arrest, unresponsive COMPARISON: None. TECHNIQUE: Unenhanced CT of the head was performed using 5 mm images. Brain and bone windows were generated. CT dose reduction was achieved through use of a standardized protocol tailored for this examination and automatic exposure control for dose modulation. FINDINGS: The ventricles and sulci are normal in size, shape and configuration and midline. There is mild white matter disease. There are physiologic calcifications of the basal ganglia.  There is no intracranial hemorrhage, extra-axial collection, mass, mass effect or midline shift. The basilar cisterns are open. No acute infarct is identified. The bone windows demonstrate no abnormalities. The visualized portions of the paranasal sinuses and mastoid air cells are clear. IMPRESSION: Mild probably ischemic white matter disease. Otherwise unremarkable. Xr Chest Port    Result Date: 7/19/2017  CHEST ONE VIEW portable 1026 hours CPT CODE: 65450 HISTORY: Unresponsive on arrival with no pulse, following pacemaker firing, return to spontaneous circulation following CPR. COMPARISON: Chest x-ray January 27, 2016. FINDINGS: Single view obtained. Endotracheal tube terminates 5.6 cm proximal to the susana. The esophagogastric tube enters the stomach and extends beyond off the film. Right atrial and right ventricular leads of the left upper chest AICD in stable position. The cardiac silhouette is mildly enlarged. There is tortuosity of the aorta with calcified plaque. The lungs are clear. Pulmonary vascularity is normal. The costophrenic angles are sharply defined. No bony abnormalities are seen. IMPRESSION: Support tubes in expected position. No pneumothorax. Stable mild cardiomegaly. Atherosclerosis. Progress notes, Consult notes or additional Procedure notes:   10:22 AM: Dr. Danielle Dodson [facility] hospitalist) agrees with admission. 10:35 AM: Dr. Isaias Watkins (ICU) admission agrees with ICU admission. Disposition:  Diagnosis:   1. Cardiac arrest ([facility]    2. Ventricular tachycardia ([facility]    3. Renal insufficiency    4. Hyperglycemia        Disposition: Admitted      11:45 AM  I have spent 30-74 minutes of critical care time involved in lab review, consultations with specialist, family decision-making, and documentation. During this entire length of time I was immediately available to the patient. Critical Care:   The reason for providing this level of medical care for this critically ill patient was due a critical illness that impaired one or more vital organ systems such that there was a high probability of imminent or life threatening deterioration in the patients condition. This care involved high complexity decision making to assess, manipulate, and support vital system functions, to treat this degreee vital organ system failure and to prevent further life threatening deterioration of the patients condition. Scribe Attestation  [facility] for and in the presence of Pia Agrawal MD (07/19/17)      Physician Attestation  I personally performed the services described in this documentation, reviewed and edited the documentation which was dictated to the scribe in my presence, and it accurately records my words and actions.     Pia Agrawal MD (07/19/17)      Signed by: Shelley Huerta, July 19, 2017 at 10:01 AM

## 2017-07-19 NOTE — ED NOTES
2 shirts cut, 1 set coveralls , shorts, cut on arrival,   shoes,wallet, phone accu check strips, change,  placed in bag  no jewelry on patient/

## 2017-07-19 NOTE — CONSULTS
[facility] - EP Consult Note    Consultation request by No admitting provider for patient encounter. for advice/opinion related to evaluating There are no admission diagnoses documented for this encounter. VT    Date of  Admission: 7/19/2017  9:46 AM   Primary Care Physician:  Jennifer Ann MD     Assessment:     Patient Active Problem List   Diagnosis Code    Hyperlipidemia E78.5    DM type 2 (diabetes mellitus, type 2) [facility] E11.9    Essential hypertension, benign I10    Ventricular tachycardia ([facility] I47.2    Congestive heart failure (HCC) I50.9    Other and unspecified angina pectoris I20.9    Chronic kidney disease, stage III (moderate) N18.3    Type 2 diabetes mellitus without complication (HCC) A71.1    SBO (small bowel obstruction) ([facility] K56.69    Type 2 diabetes mellitus with stage 2 chronic kidney disease (HCC) E11.22, N18.2    Generalized ischemic myocardial dysfunction I25.5    Automatic implantable cardioverter-defibrillator in situ Z95.810    Hypertension I10    Diabetes mellitus type 1A ([facility] E10.9    Atrial paroxysmal tachycardia (HCC) I47.1    CKD (chronic kidney disease) stage 3, GFR 30-59 ml/min N18.3    Diabetes (HCC) M83.4    Systolic heart failure (HCC) I50.20    AICD (automatic cardioverter/defibrillator) present Z95.810    Ischemic cardiomyopathy I25.5    MI (myocardial infarction) ([facility] I21.3    Apical mural thrombus [id number]       -Cardiac arrest, arrived unresponsive, pulseless VT, s/p shock by AICD and external shock, currently intubated and unresponsive on hypothermia protocol. Device interrogation revealed appropriate shocks for VT storm this AM. No further events since arrest.  -Encephalopathy with seizure activity, anoxic brain injury versus possible neurologic event. Neurology consult and EEG pending.  -History of VT s/p dual chamber [employer] AICD. Patient is followed by Dr. Jennifer Carey, Dr. José Miguel Davenport, Dr. Armen Paige.  Previously scheduled for VT ablation, but cancelled due to apical thrombus. Maintained on amiodarone, mexiletine and BB. Device working appropriately on interrogation this admission. -H/o apical thrombus, present on echo 12/2016. Maintained on warfarin, INR 2.7.  -Ischemic cardiomyopathy EF 30% 12/2016 with apical thrombus. Not currently decompensated. -CAD s/p history of remote anterior MI, previously followed by Dr. Chela Spence. -Paroxysmal atrial fibrillation on remote AICD check.  -Hypertension. Low normal at present.  -Diabetes. On insulin.  -Dyslipidemia. On Crestor.  -Acute on chronic kidney disease. Patient is followed by Dr. Fani Fabian, Dr. Emmanuel Gramajo, Dr. Nubia Norris. Plan:     Would load with amiodarone. Will interrogate AICD. Will check echocardiogram for EF and LV thrombus. Will follow serial biomarkers and ECGs. Trend INR, heparin drip when INR <2.  Will await neurology evaluation. If makes meaningful recovery will need to consider EP study/ablation and possible ischemic evaluation. Continue supportive care and hypothermia protocol as outlined by ICU team.     History of Present Illness: This is a 74 y.o. male admitted for There are no admission diagnoses documented for this encounter. .      Patient complains of:  Unresponsive. Patient is currently intubated and unresponsive so unable to provide history. Patient was brought to ER by brother. Patient was shocked by AICD at house. Patient became unresponsive en route. Patient was found to have pulseless VT. CPR was started. He was shocked externally and by AICD. Cardiac risk factors:  CAD  HTN  DM  Lipids  ICMY  VT  AICD    Review of Symptoms:   Unable to provide history.      Past Medical History:     Past Medical History:   Diagnosis Date    AICD (automatic cardioverter/defibrillator) present     Aneurysm of left ventricle of heart     with vt    Apical mural thrombus (HCC)     CKD (chronic kidney disease) stage 3, GFR 30-59 ml/min     [facility]     IDDM    Essential hypertension, benign     Ischemic cardiomyopathy     MI [facility]ocardial infarction) [facility]     Mixed hyperlipidemia     Other and unspecified angina pectoris ([facility]     Paroxysmal ventricular tachycardia (HCC)     SBO (small bowel obstruction) (HCC)     Systolic heart failure (HCC)          Social History:     Social History     Social History    Marital status: SINGLE     Spouse name: N/A    Number of children: N/A    Years of education: N/A     Social History Main Topics    Smoking status: Former Smoker     Quit date: 1/1/1985    Smokeless tobacco: Never Used    Alcohol use No    Drug use: No    Sexual activity: Not Currently     Partners: Female     Other Topics Concern    Not on file     Social History Narrative        Family History:     Family History   Problem Relation Age of Onset    Heart Disease Mother     Cancer Father      lung cancer    Cancer Sister     Heart Disease Brother         Medications:   No Known Allergies     Current Facility-Administered Medications   Medication Dose Route Frequency    amiodarone (NEXTERONE) 360 mg in dextrose 200 mL (1.8 mg/mL) infusion  0.5-1 mg/min IntraVENous TITRATE    sodium chloride 0.9 % bolus infusion 1,000 mL  1,000 mL IntraVENous ONCE    sodium chloride 0.9 % bolus infusion 1,000 mL  1,000 mL IntraVENous ONCE    propofol (DIPRIVAN) infusion  5-50 mcg/kg/min IntraVENous TITRATE    magnesium sulfate 4 g/100 mL IVPB  4 g IntraVENous NOW     Current Outpatient Prescriptions   Medication Sig    mexiletine (MEXITIL) 150 mg capsule Take 1 Cap by mouth three (3) times daily. 12/6/16, QTY #90, Thirty Day Supply by Dr. Birgit Moura    spironolactone (ALDACTONE) 25 mg tablet Take 1 Tab by mouth daily. 12/9/16, QTY #30, [de-identified] Day Supply by Dr. Adams Durbin. Herre.  warfarin (COUMADIN) 5 mg tablet Take 5 mg by mouth daily.  amiodarone (CORDARONE) 200 mg tablet Take 100 mg by mouth two (2) times a day.     amLODIPine (NORVASC) 10 mg tablet Take 10 mg by mouth daily.  enalapril (VASOTEC) 20 mg tablet Take 40 mg by mouth daily.  glipiZIDE SR (GLUCOTROL XL) 10 mg CR tablet Take 10 mg by mouth daily.  metoprolol succinate (TOPROL XL) 200 mg XL tablet Take 200 mg by mouth daily.  aspirin 81 mg chewable tablet Take 81 mg by mouth daily.  sildenafil citrate (VIAGRA) 100 mg tablet 1/2 or 1 po 1 hr prior to sex    ferrous sulfate 325 mg (65 mg iron) tablet Take 1 Tab by mouth Daily (before breakfast).  rosuvastatin (CRESTOR) 20 mg tablet Take 1 Tab by mouth nightly.     insulin NPH (NOVOLIN N, HUMULIN N) 100 unit/mL injection 35 units qam; 25 qpm (Patient taking differently: 25 units qam; 25 qpm)         Physical Exam:     Visit Vitals    BP 91/59    Pulse 60    Resp 24    Wt 97.1 kg (214 lb)    SpO2 100%    BMI 28.23 kg/m2     BP Readings from Last 3 Encounters:   07/19/17 91/59   12/30/16 168/89   08/09/16 132/82     Pulse Readings from Last 3 Encounters:   07/19/17 60   12/30/16 60   08/09/16 60     Wt Readings from Last 3 Encounters:   07/19/17 97.1 kg (214 lb)   12/29/16 97.5 kg (215 lb)   08/09/16 99.3 kg (219 lb)       General:  unresponsive  Neck:  no JVD  Lungs:  clear to auscultation bilaterally  Heart:  regular rate and rhythm  Abdomen:  abdomen is soft   Extremities:  no edema  Skin:  warm and dry  Neuro: unresponsive  Psych: unresponsive     Data Review:     Recent Labs      07/19/17   0951   WBC  9.6   HGB  11.8*   HCT  37.3   PLT  266     Recent Labs      07/19/17   0951   NA  139   K  3.8   CL  106   CO2  21   GLU  258*   BUN  21*   CREA  2.36*   CA  8.4*   MG  2.1   ALB  3.5   SGOT  268*   ALT  242*   INR  2.7*       Results for orders placed or performed during the hospital encounter of 07/19/17   EKG, 12 LEAD, INITIAL   Result Value Ref Range    Ventricular Rate 79 BPM    Atrial Rate 79 BPM    QRS Duration 192 ms    Q-T Interval 442 ms    QTC Calculation (Bezet) 506 ms    Calculated R Axis -90 degrees    Calculated T Axis 89 degrees    Diagnosis       Demand pacemaker, interpretation is based on intrinsic rhythm  Wide QRS rhythm with fusion complexes  Left axis deviation    Confirmed by Yasmin Craven MD, Breann Ignacio ([id number]) on 7/19/2017 10:50:24 AM         All Cardiac Markers in the last 24 hours:    Lab Results   Component Value Date/Time     07/19/2017 09:51 AM    CKMB 2.5 07/19/2017 09:51 AM    CKND1 0.9 07/19/2017 09:51 AM    TROIQ 0.06 (H) 07/19/2017 09:51 AM       Last Lipid:    Lab Results   Component Value Date/Time    Cholesterol, total 206 09/21/2016 01:24 PM    HDL Cholesterol 51 09/21/2016 01:24 PM    LDL, calculated 129 09/21/2016 01:24 PM    Triglyceride 130 09/21/2016 01:24 PM    CHOL/HDL Ratio 4.0 09/21/2016 01:24 PM       Signed By: HERIBERTO Rice     July 19, 2017

## 2017-07-20 NOTE — PROGRESS NOTES
[facility] - Progress Note  Admit Date: 7/19/2017     The patient was seen, examined, and independently evaluated and I agree with the below assessment and plan by Miranda White PA-C with the following comments. Remains unresponsive and on hypothermic protocol. Nothing to offer at this time from cardiovascular view point. If any meaningful neurologic recovery would consider ischemic and EP evaluation. Will sign begin to follow as needed. Please call with questions or new CV symptoms. Assessment:     Hospital Problems  Date Reviewed: 8/9/2016          Codes Class Noted POA    Cardiac arrest [facility]) ICD-10-CM: I46.9  ICD-9-CM: 427.5  7/19/2017 Unknown               -Cardiac arrest, arrived unresponsive, pulseless VT, s/p shock 10 timed by AICD and external shock, currently intubated and unresponsive on hypothermia protocol. Device interrogation revealed appropriate shocks for VT storm. No further events since arrest.  -Encephalopathy with seizure activity, anoxic brain injury versus possible neurologic event. Neurology consult and EEG pending.  -History of VT s/p dual chamber [employer] AICD. Patient is followed by Dr. Alonzo Ozuna, Dr. Jeffrey Hackett, Dr. Viviana Santos. Previously scheduled for VT ablation, but cancelled due to apical thrombus. Maintained on amiodarone, mexiletine and BB. Device working appropriately on interrogation this admission.  -Elevated troponin, suspect secondary to VT with multiple shocks but known CAD as noted below. Not currently candidate for further coronary work-up.  -H/o apical thrombus, present on echo 12/2016. Maintained on warfarin, INR 2.7 on arrival. No evidence of thrombus on echo this admission.  -Ischemic cardiomyopathy EF 30% on echo this admission unchanged from 12/2016. Not currently decompensated. -CAD s/p history of remote anterior MI, previously followed by Dr. Mary Duff. -Paroxysmal atrial fibrillation on remote AICD check.  -Hypertension.  Low normal at present.  -Diabetes. On insulin. Hgb A1c 7.8.  -Dyslipidemia. On Crestor.  -Acute on chronic kidney disease.  -Elevated LFTs. -Coagulopathy with INR trending upward despite holding warfarin.     Patient is followed by Dr. Latha Estrada, Dr. Francie Dinh, Dr. Fariha Liu. Plan:     Echo yesterday with EF 30% without evidence of thrombus. Runs of VT noted overnight with decreased amio rate which terminated with ATP, amio increased back up. Will continue amiodarone infusion for now, continue to follow LFTs. Follow Hgb and INR, warfarin remains on hold. ASA if Hgb remains stable and INR trends down. Would continue hypothermia protocol and supportive care per ICU team.  If makes meaningful recovery, will need to consider ischemic evaluation and EP study with VT ablation. Subjective:      Intubated unresponsive    Objective:      Patient Vitals for the past 8 hrs:   Temp Pulse Resp BP SpO2   07/20/17 1215 - 60 14 - 100 %   07/20/17 1200 - 60 14 112/74 100 %   07/20/17 1145 - 60 14 - 100 %   07/20/17 1130 - 60 14 107/77 100 %   07/20/17 1115 - 60 (!) 0 - 100 %   07/20/17 1100 - 60 12 92/67 100 %   07/20/17 1045 - 60 10 - 100 %   07/20/17 1030 - 60 14 93/71 100 %   07/20/17 1015 - 60 13 - 100 %   07/20/17 1000 - 60 14 (!) 89/70 100 %   07/20/17 0945 - 60 14 - 100 %   07/20/17 0930 - 60 15 91/68 100 %   07/20/17 0915 - 60 14 - 100 %   07/20/17 0900 (!) 90.3 °F (32.4 °C) 60 14 (!) 81/64 100 %   07/20/17 0848 - 60 14 - 100 %   07/20/17 0845 - 60 12 - 100 %   07/20/17 0830 - 60 12 91/70 100 %   07/20/17 0815 - 60 16 - 100 %   07/20/17 0800 (!) 89.6 °F (32 °C) 60 16 105/84 100 %   07/20/17 0745 - 63 19 - 100 %   07/20/17 0730 - 60 14 119/76 100 %   07/20/17 0715 - 60 15 - 100 %   07/20/17 0700 (!) 88.9 °F (31.6 °C) 60 18 130/85 100 %   07/20/17 0645 - 62 17 - 100 %   07/20/17 0630 - 60 16 134/86 100 %   07/20/17 0600 (!) 89.1 °F (31.7 °C) 60 8 130/86 100 %   07/20/17 0530 - 60 11 123/90 -   07/20/17 0500 (!) 91.4 °F (33 °C) 60 14 114/82 100 %         Patient Vitals for the past 96 hrs:   Weight   07/20/17 0400 96.4 kg (212 lb 9.6 oz)   07/19/17 1005 97.1 kg (214 lb)                    Intake/Output Summary (Last 24 hours) at 07/20/17 1239  Last data filed at 07/20/17 1200   Gross per 24 hour   Intake          2836.07 ml   Output             6785 ml   Net         -3948.93 ml       Physical Exam:  General:  no distress  Neck:  no JVD  Lungs:  clear to auscultation bilaterally  Heart:  regular rate and rhythm  Abdomen:  abdomen is soft  Extremities:  no edema    Data Review:     Labs: Results:       Chemistry Recent Labs      07/20/17   1000  07/20/17   0359  07/19/17 2119   GLU  146*  110*  255*   NA  142  142  141   K  3.9  4.1  3.6   CL  109*  109*  109*   CO2  22  20*  17*   BUN  20*  22*  21*   CREA  1.39*  1.76*  2.15*   CA  8.1*  8.3*  8.9   MG  2.1  2.4  2.7*   PHOS  3.3  1.8*  1.5*   AGAP  11  13  15   BUCR  14  13  10*   AP  83  82  97   TP  6.1*  6.5  7.9   ALB  3.0*  2.9*  3.4   GLOB  3.1  3.6  4.5*   AGRAT  1.0  0.8  0.8      CBC w/Diff Recent Labs      07/20/17   1000  07/20/17   0359  07/19/17 2119   WBC  12.2  8.8  14.4*   RBC  3.92*  3.89*  4.38*   HGB  10.6*  10.5*  12.0*   HCT  31.9*  31.5*  36.0   PLT  228  223  241   GRANS  87*  84*  91*   LYMPH  6*  8*  3*   EOS  0  0  0      Cardiac Enzymes Lab Results   Component Value Date/Time     (H) 07/20/2017 03:59 AM     (H) 07/19/2017 09:19 PM     (H) 07/19/2017 03:30 PM    CKMB 39.5 (H) 07/20/2017 03:59 AM    CKMB 41.2 (H) 07/19/2017 09:19 PM    CKMB 29.5 (H) 07/19/2017 03:30 PM    CKND1 5.9 (H) 07/20/2017 03:59 AM    CKND1 6.0 (H) 07/19/2017 09:19 PM    CKND1 5.8 (H) 07/19/2017 03:30 PM    TROIQ 3.62 (HH) 07/20/2017 03:59 AM    TROIQ 4.16 (HH) 07/19/2017 09:19 PM    TROIQ 4.52 () 07/19/2017 03:30 PM      Coagulation Recent Labs      07/20/17   1000  07/20/17   0359   PTP  36.6*  34.8*   INR  4.0*  3.7*   APTT  59.5*  58.0*       Lipid Panel Lab Results   Component Value Date/Time    Cholesterol, total 206 09/21/2016 01:24 PM    HDL Cholesterol 51 09/21/2016 01:24 PM    LDL, calculated 129 09/21/2016 01:24 PM    VLDL, calculated 26 09/21/2016 01:24 PM    Triglyceride 130 09/21/2016 01:24 PM    CHOL/HDL Ratio 4.0 09/21/2016 01:24 PM      BNP No results found for: BNP, BNPP, XBNPT   Liver Enzymes Recent Labs      07/20/17   1000   TP  6.1*   ALB  3.0*   AP  83   SGOT  440*      Digoxin    Thyroid Studies No results found for: T4, T3U, TSH, TSHEXT, TSHEXT       Signed By: Leon Wilkerson DO     July 20, 2017

## 2017-07-20 NOTE — PROGRESS NOTES
Medical Progress Note      NAME: Becky Starks   :  1950  MRM:  [id number]    Date/Time: 2017  7:43 PM         Subjective:     Mr. Sidra Holder is sedated and  intubated in ICU on hypothermia protocol after cardiac arrest. Multiple drips. Pupils non reactive. Per nurse reports, he has frequent episodes of myoclonus. EEG was done. Family expressed that they wanted FULL CODE however they are open to more discussion depending on prognosis. Past Medical History reviewed and unchanged from Admission History and Physical    Review of Systems   Unable to perform ROS: Intubated            Objective:       Vitals:      Last 24hrs VS reviewed since prior progress note. Most recent are:    Visit Vitals    /65 (BP 1 Location: Left arm, BP Patient Position: At rest)    Pulse 60    Temp (!) 93.2 °F (34 °C)    Resp 15    Ht 6' 1\" (1.854 m)    Wt 96.4 kg (212 lb 9.6 oz)    SpO2 100%    BMI 28.05 kg/m2     SpO2 Readings from Last 6 Encounters:   17 100%   16 97%   16 99%   16 93%   16 99%   16 96%            Intake/Output Summary (Last 24 hours) at 17  Last data filed at 17 1901   Gross per 24 hour   Intake          2655.07 ml   Output             3145 ml   Net          -489.93 ml          Exam:      Physical Exam   Constitutional: No distress. HENT:   Head: Normocephalic.    ET tube in place    Eyes:   Miotic pupils, non responsive to light or accomodation   Pulmonary/Chest:   Mechanical ventilation   Genitourinary:   Genitourinary Comments: Santoyo in place    Neurological:   Sedated, unresponsive   Skin:   Cold due to hypothermia protocol       Telemetry reviewed:   normal sinus rhythm  Tubes:   Santoyo, NGT, Intubated/Vent and femoral line    Lab Data Reviewed: (see below)      Medications:  Current Facility-Administered Medications   Medication Dose Route Frequency    midazolam (VERSED) 100 mg in 0.9% sodium chloride 100 mL infusion  1-8 mg/hr IntraVENous TITRATE    fentaNYL citrate (PF) injection  mcg   mcg IntraVENous Q4H PRN    levETIRAcetam (KEPPRA) 1,000 mg in 100 ml IVPB  1,000 mg IntraVENous Q12H    amiodarone (NEXTERONE) 360 mg in dextrose 200 mL (1.8 mg/mL) infusion  0.5-1 mg/min IntraVENous TITRATE    0.9% sodium chloride infusion  50 mL/hr IntraVENous CONTINUOUS    hydroxypropyl methylcellulose (GONAK) 2.5 % ophthalmic solution 1 Drop  1 Drop Both Eyes Q12H    LORazepam (ATIVAN) injection 2 mg  2 mg IntraVENous Q2H PRN    insulin regular (NOVOLIN R, HUMULIN R) 100 Units in 0.9% sodium chloride 100 mL infusion  0-50 Units/hr IntraVENous TITRATE    glucose chewable tablet 16 g  4 Tab Oral PRN    glucagon (GLUCAGEN) injection 1 mg  1 mg IntraMUSCular PRN    dextrose (D50W) injection syrg 12.5-25 g  25-50 mL IntraVENous PRN    pantoprazole (PROTONIX) 40 mg in sodium chloride 0.9 % 10 mL injection  40 mg IntraVENous DAILY       ______________________________________________________________________      Lab Review:     Recent Labs      07/20/17   1700  07/20/17   1000  07/20/17   0359   WBC  14.3*  12.2  8.8   HGB  11.3*  10.6*  10.5*   HCT  34.0*  31.9*  31.5*   PLT  244  228  223     Recent Labs      07/20/17   1700  07/20/17   1000  07/20/17   0359   NA  140  142  142   K  4.3  3.9  4.1   CL  109*  109*  109*   CO2  22  22  20*   GLU  206*  146*  110*   BUN  18  20*  22*   CREA  1.41*  1.39*  1.76*   CA  8.4*  8.1*  8.3*   MG  2.1  2.1  2.4   PHOS  2.7  3.3  1.8*   ALB  3.2*  3.0*  2.9*   SGOT  319*  440*  617*   ALT  394*  401*  437*   INR  4.5*  4.0*  3.7*     No components found for: GLPOC  No results for input(s): PH, PCO2, PO2, HCO3, FIO2 in the last 72 hours.   Recent Labs      07/20/17   1700  07/20/17   1000  07/20/17   0359   INR  4.5*  4.0*  3.7*            Assessment:     Active Problems:    Cardiac arrest ([facility] [address] (7/19/2017)      Ventricular tachycardia (HCC) ()      Overview: Overview:       S/p AICD ALPHEE study      Congestive heart failure (HCC) ()      Type 2 diabetes mellitus with stage 2 chronic kidney disease ([facility] [address] (7/28/2016)      DM type 2 (diabetes mellitus, type 2) (HCC) ()      Hyperlipidemia ()      Essential hypertension, benign ()      Atrial paroxysmal tachycardia ([facility] [address] (6/7/2010)      AICD (automatic cardioverter/defibrillator) present ()      Ischemic cardiomyopathy ()           Plan:     Risk of deterioration: high Guevara Souza is a 74 y.o. male with a history of ischemic cardiomyopathy, CABG and ICD brought by EMS pulseless, likely V. Tach per AICD records. Hypothermia protocol after ROSC achieved. Patient intubated to ventilator with high suspicious for anoxic encephalopathy. PLAN:  1. Cardiac Arrest: Device interrogation revealed appropriate shocks for VT.  - Cardiology following. Input appreciated. - Rewarming started today @3pm.   - On IV amiodarone  - Monitoring in ICU  - Case discussed with PCP at length. 2. Encephalopathy. Likely anoxic brain injury. Persistent myoclonus per nurse report. -  Neurology consulted  -  EEG done, results pending.  - Continue IV keppra  3. Coagulopathy with INR trending up. High risk for DIC  - Will monitor PT/INR  - Monitor platelets  4. Chronic systolic and diastolic heart failure: with  EF 30%    - AICD in place and no malfunction per interrogation. 5. Paroxysmal atrial fibrillation  6. Hypertension. BP ok. No need of pressors. 7. Type 2 Diabetes with dyslipidemia  -  On insulin drip  8. Transaminitis: Likely from shock liver  -Elevated LFTs. 9. Elevated troponin: Likely after  multiple shocks from AICD and ACLS. - Will follow trend  - Will follow with cardiology.   -       Total time spent with patient: 55 minutes                 Care Plan discussed with: Nursing Staff and >50% of time spent in counseling and coordination of care    Discussed:  Care Plan    Prophylaxis:  SCD's    Disposition: TBD           ___________________________________________________    Attending Physician: Azael Montes MD

## 2017-07-20 NOTE — ROUTINE PROCESS
Bedside and Verbal shift change report given to Manoj Craven (oncoming nurse) by KYLE Turner (offgoing nurse). Report included the following information SBAR, Kardex, MAR and Recent Results.

## 2017-07-20 NOTE — HOSPICE
Radha King visited room, no one present at bedside. Called brother Colette Dobbs at [phone] with no answer and no voice mail was available for me to leave a message. I will inform Lila SNEED To follow up.

## 2017-07-20 NOTE — PROGRESS NOTES
Per Katia Tran RN with hospice, the patient and family was seen by ARNOL Mann with Hospice this morning. Katia Tran stated she will follow up this afternoon.

## 2017-07-20 NOTE — INTERDISCIPLINARY ROUNDS
CRITICAL CARE INTERDISCIPLINARY ROUNDS      Patient Information:    Name:   Tai Milligan    Age:   74 y.o.     Admission Date:   7/19/2017    Readmit Risk Assessment Information:           Surgery Date:      Day of Stay:     Expected Discharge Date:        Attending Provider:   Leslye Valadez DO    Surgeon:        Consultant:       Primary Care Provider:   Gerard Rudolph MD    Problem List:     Patient Active Problem List   Diagnosis Code    Hyperlipidemia E78.5    DM type 2 (diabetes mellitus, type 2) ([facility] [address] E11.9    Essential hypertension, benign I10    Ventricular tachycardia ([address] I47.2    Congestive heart failure ([address] I50.9    Other and unspecified angina pectoris I20.9    Chronic kidney disease, stage III (moderate) N18.3    Type 2 diabetes mellitus without complication ([address] E21.6    SBO (small bowel obstruction) ([facility] [address] K56.69    Type 2 diabetes mellitus with stage 2 chronic kidney disease ([address] E11.22, N18.2    Generalized ischemic myocardial dysfunction I25.5    Automatic implantable cardioverter-defibrillator in situ Z95.810    Hypertension I10    Diabetes mellitus type 1A ([address] E10.9    Atrial paroxysmal tachycardia (HCC) I47.1    CKD (chronic kidney disease) stage 3, GFR 30-59 ml/min N18.3    Diabetes ([address] C17.3    Systolic heart failure ([address] I50.20    AICD (automatic cardioverter/defibrillator) present Z95.810    Ischemic cardiomyopathy I25.5    MI (myocardial infarction) ([facility] [address] I21.3    Apical mural thrombus [id number]    Cardiac arrest ([facility] [address] I46.9       Principal Problem:  <principal problem not specified>    Procedure:       During rounds the following quality care indicators and evidence based practices were addressed :     DVT Prophylaxis, Pressure Injury Prevention, Pain Management, Sepsis resuscitation and management, Nutritional Status, Critical Care Interventions Airways, Drains and Lines and IHI Bundles: Central Line Bundle Followed , Santoyo Bundle Followed and Vent Bundle Followed, Vent Day 2           Acute MI/PCI:   Not applicable    Heart Failure:    Not applicable    Cardiac Surgery:  Not applicable    SCIP Measures for other Surgeries:   Not applicable    Pneumonia:    Appropriate Antibiotic Selection (ICU versus Non-ICU)    Stroke:  Patient's Personal Risk Factors for Stroke are:   hypertension, family history, hyperlipidemia, diabetes mellitus or CHF    NIH Stroke Score       Transfer Level of Care:  Not Ready for Transfer    The patient will require the following interventions based on the Readmission Risk Assessment:  Pharmacy evaluation and teaching, Care Management involvement for home health follow up for:  mobility and assistance with ADL's and Palliative Care evaluation      Discharge Management:  Home    Anticipated Discharge Date:  3      Interdisciplinary team rounds were held  with the following team membersCare Management, Nursing, Nutrition, Pastoral Care, Pharmacy, Physician and Clinical Coordinator and the  patient and sibling(s). Plan of care discussed. See clinical pathway and/or care plan for interventions and desired outcomes. Start rewarming at 1500. Patient still seizing continuously  and posturing. AV paced still has runs of VTach amiodarone increased, EF30%. Insulin drip off since 4AM and start versed drip.

## 2017-07-20 NOTE — PROGRESS NOTES
26 - Assumed care of patient. Patient is unresponsive on ventilator. Vitals currently stable. Safety measures in place: emergency bedside equipment, frequent rounds, side rails up. Lines are clean, dry, intact. Assessment in progress. Initial Assessment -   NEURO:   Patient is sedated on proprofol  Pupils fixed and pinpointed  Myoclonus noted  Decerebrate posturing noted in lower extremities  No movement in upper extremities. No gag/cough reflex    CARDIAC:  A/V paced with rate in lower 60's  When not paced, sinus deanne with BBB and 1AVB rhythm noted  Frequent PVCs  Pulses palpable upper, palpable lower  No edema present  DVT Prophylaxis: SCDs    RESPIRATORY:   Respirations unlabored   Does not breath over the vent  Clear diminished breath sounds  Scant secretions via ET/oral  O2 method: Vent AC/PC mode   #7 ETT @ 24 lips      GI:   Hypoactive bowel sounds. Diet: NPO  OG @ 63 cm tube in place   BM PTA    :   Santoyo catheter in place draining clear, yellow urine  Good UOP    SKIN:  Skin intact with exception of small scab on left lower extremity    LINES:  R. Fem CVL present with dressing CDI  R. Radial arterial line present, zero'd and leveled. 0725 - Titrated up propofol due to increased myoclonus. 0830 - PRN ativan for seizure activity. 0915 - Propofol titrated down due to hypotension and minimal effect on twitching  0930 - Interdisciplinary rounding. Discussed overnight events, switching sedation to versed, and plan of care. 1100 - Propofol transitioned off and versed gtt started. 1142 - Patient having continuous jerking. D/W MD. Versed bolus given. 1215 - Versed at max dose, jerking still noted. 1225 - Ventilator alarming despite suctioning, patient pulling large volumes. PRN fentanyl given. 1231 - Patient shivering in addition to myoclonus. PRN vec administered. Train 4/4 at time of administration. 1311 - Hypoglycemic episode, 12.5 mg dextrose administered.    1332 - Rechecked blood sugar @ 194  1500 - Rewarming process started. Will monitor closely for hemodynamic stability. 1700 - Patient placed into specialty bed.   1820 - PRN versed given for seizure activity. 1915 - Bedside and Verbal shift change report given to Cristopher Riggs (oncoming nurse) by Shantanu Gamez (offgoing nurse). Report included the following information SBAR and Kardex.

## 2017-07-20 NOTE — PROGRESS NOTES
Shift Summary:    Patient arrived on unit post-cardiac arrest on ventilator. Placed on ICU monitors. EKG obtained due to interference on monitors. Patient was noted to have myoclonic jerking and propofol started ECHO, EEG, and duplex of lower extremities preformed. Arterial line and CVL placed. At change of shift, patient had short runs of V-Tach, and then two longer runs of VTach of about 9 seconds long without loss of pulse. D/W Dr. Blanca Guerrier and HERIEBRTO REYNOSO [facility]. Per Cardiology, increase amiodarone gtt back to 1mg. Continue to monitor. Call Dr. Blanca Guerrier if this continues. Bedside and Verbal shift change report given to Dallin Martinez (oncoming nurse) by Fabienne Panchal (offgoing nurse). Report included the following information SBAR and Kardex.

## 2017-07-20 NOTE — DIABETES MGMT
GLYCEMIC CONTROL PLAN OF CARE    Assessment/Recommendations:  Pt in warming phase of hypothermia protocol. Continue accuchecks every hour, monitor for need to advance to correctional lispro protocol. Most recent blood glucose values:  Results for Lily Dick (MRN [id number]) as of 7/20/2017 11:15   Ref.  Range 7/20/2017 07:05 7/20/2017 08:13 7/20/2017 09:18 7/20/2017 10:11   GLUCOSE,FAST - POC Latest Ref Range: 70 - 110 mg/dL 116 (H) 130 (H) 112 (H) 160 (H)     Current A1C:  7.8%-7/20/17 estimated average blood glucose of 177 mg/dl over the past 2-3 months    Current hospital diabetes medications:  None- pt taken off of glucostablizer but is havnig accuchecks every hour    Previous day Insulin TDD:  7/19: 63 units of regular via glucostablizer    Diet:   NPO    Home diabetes medications:  10 mg glipizide  25 units NPH two times daily    Goals:  Pt BG will be within target range by 7/23/17_____    Education:  ___  Refer to Diabetes Education Record             _x__  Education not indicated at this time- intubated/vent    Biju Guzman, MS, RD, CDE  Pager: [phone]

## 2017-07-20 NOTE — PROGRESS NOTES
[facility]  ICU Progress Note      Name: Chanda Grigsby   : 1950   MRN: [id number]   Date: 2017      [x]I have reviewed the flowsheet and previous days notes. Events overnight reviewed and discussed with nursing staff. Vital signs and records reviewed. Subjective:  73 y/o male admitted to ICU following cardiac arrest, VT/VF with ROSC in approximately 8 mins. Hypothermia protocol initiated and currently is on the cooling phase. No recurrence of Vtach/Vfib overnight; remains on amiodarone at 1 mg/min. Pt was restarted on paralytic after episodes of shivering were noted. Pt had a short episode of hypotension while on propofol, fentanyl and paralytic. TOF 4 while on vecuronium. BP responded appropriately to discontinuing fentanyl and paralytic this morning. Urine output adequate - was having polyuria yesterday however overnight has tapered off. Secretions thru ETT scant in amount. [x]The patient is unable to give any meaningful history or review of systems because the patient is:  [x]Intubated [x]Sedated   []Unresponsive      [x]The patient is critically ill on      [x]Mechanical ventilation [x]Pressors   []BiPAP []                 ROS:Review of systems not obtained due to patient factors.     Medication Review:  · Pressors - none  · Sedation - propofol gtt  · Antibiotics - none  · Pain - fentanyl gtt (currently placed on hold)  · GI/ DVT - protonix  · Others (other gtts)    Safety Bundles: VAP Bundle/ CAUTI/ Electrolyte Replacement Protocol    Vital Signs:    Visit Vitals    /86    Pulse 60    Temp (!) 89.1 °F (31.7 °C)    Resp 8    Ht 6' 1\" (1.854 m)    Wt 97.1 kg (214 lb)    SpO2 100%    BMI 28.23 kg/m2               Temp (24hrs), Av.6 °F (33.1 °C), Min:89.1 °F (31.7 °C), Max:95.4 °F (35.2 °C)       Intake/Output:   Last shift:      1901 -  0700  In: 463.8 [I.V.:463.8]  Out: 925 [Urine:925]  Last 3 shifts:  07 -  190  In: 911.1 [I.V.:851.1]  Out: 4500 [Urine:4100]    Intake/Output Summary (Last 24 hours) at 07/20/17 2193  Last data filed at 07/20/17 0300   Gross per 24 hour   Intake          1374.82 ml   Output             5425 ml   Net         -4050.18 ml       Ventilator Settings:  Ventilator  Mode: Assist control, Pressure control  Respiratory Rate  Back-Up Rate: 12  Insp Time (sec): 1.5 sec  Ventilator Volumes  Vt Set (ml): 450 ml  Vt Exhaled (Machine Breath) (ml): 500 ml  Ve Observed (l/min): 8.98 l/min  Ventilator Pressures  PC Set: 13  PIP Observed (cm H2O): 19 cm H2O  Plateau Pressure (cm H2O): 16 cm H2O  MAP (cm H2O): 10  PEEP/VENT (cm H2O): 5 cm H20      Physical Exam:    General: intubated, sedated    HEENT:  Anicteric sclerae; pink palpebral conjunctivae; mucosa moist  Resp:  Symmetrical chest expansion, no accessory muscle use; good airway entry; auscultation somewhat limited due to cooling blanket covering the chest wall - no rales/ wheezing/ rhonchi noted on limited space for auscultation  CV:  S1, S2 present; regular rate and rhythm  GI:  Abdomen soft, non-tender; hypoactive bowel sounds  Extremities:  +2 pulses on all extremities; no edema/ cyanosis/ clubbing noted  Skin:  Cool to touch; no rashes/ lesions noted  Neurologic:  Sedated, paralyzed   Devices:    7/19/17 -- ETT with madi; right femoral CVC; right arterial line      DATA:     Current Facility-Administered Medications   Medication Dose Route Frequency    amiodarone (NEXTERONE) 360 mg in dextrose 200 mL (1.8 mg/mL) infusion  0.5-1 mg/min IntraVENous TITRATE    propofol (DIPRIVAN) infusion  5-50 mcg/kg/min IntraVENous TITRATE    0.9% sodium chloride infusion  50 mL/hr IntraVENous CONTINUOUS    hydroxypropyl methylcellulose (GONAK) 2.5 % ophthalmic solution 1 Drop  1 Drop Both Eyes Q12H    LORazepam (ATIVAN) injection 2 mg  2 mg IntraVENous Q2H PRN    insulin regular (NOVOLIN R, HUMULIN R) 100 Units in 0.9% sodium chloride 100 mL infusion  0-50 Units/hr IntraVENous TITRATE    glucose chewable tablet 16 g  4 Tab Oral PRN    glucagon (GLUCAGEN) injection 1 mg  1 mg IntraMUSCular PRN    dextrose (D50W) injection syrg 12.5-25 g  25-50 mL IntraVENous PRN    pantoprazole (PROTONIX) 40 mg in sodium chloride 0.9 % 10 mL injection  40 mg IntraVENous DAILY    vecuronium (NORCURON) 100 mg in 0.9% sodium chloride 100 mL infusion  0.8-1.2 mcg/kg/min IntraVENous TITRATE    levETIRAcetam (KEPPRA) 500 mg in 100 ml IVPB  500 mg IntraVENous Q12H    fentaNYL (PF) 10 mcg/mL infusion  0-50 mcg/hr IntraVENous TITRATE         Labs: Results:       Chemistry Recent Labs      07/20/17 0359 07/19/17 2119 07/19/17   1530   GLU  110*  255*  368*   NA  142  141  138   K  4.1  3.6  4.4   CL  109*  109*  108   CO2  20*  17*  17*   BUN  22*  21*  22*   CREA  1.76*  2.15*  2.14*   CA  8.3*  8.9  8.4*   AGAP  13  15  13   BUCR  13  10*  10*   AP  82  97  101   TP  6.5  7.9  7.3   ALB  2.9*  3.4  3.6   GLOB  3.6  4.5*  3.7   AGRAT  0.8  0.8  1.0      CBC w/Diff Recent Labs      07/20/17 0359 07/19/17 2119 07/19/17   1530   WBC  8.8  14.4*  15.6*   RBC  3.89*  4.38*  4.29*   HGB  10.5*  12.0*  11.6*   HCT  31.5*  36.0  35.8*   PLT  223  241  244   GRANS  84*  91*  90*   LYMPH  8*  3*  3*   EOS  0  0  0      Coagulation Recent Labs      07/20/17 0359 07/19/17 2119   PTP  34.8*  31.6*   INR  3.7*  3.3*   APTT  58.0*  42.1*       Liver Enzymes Recent Labs      07/20/17 0359   TP  6.5   ALB  2.9*   AP  82   SGOT  617*      ABG Lab Results   Component Value Date/Time    PHI 7.502 (H) 07/20/2017 04:22 AM    PCO2I 26.2 (L) 07/20/2017 04:22 AM    PO2I 124 (H) 07/20/2017 04:22 AM    HCO3I 21.3 (L) 07/20/2017 04:22 AM    FIO2I 40 07/20/2017 04:22 AM      Microbiology No results for input(s): CULT in the last 72 hours.        Telemetry: []Sinus []A-flutter [x]Paced    []A-fib []Multiple PVCs                    Imaging:  CXR (7/20/17) - ETT in appropriate place; no acute change from prior CXR on 7/19        IMPRESSION:   · S/p cardiac arrest, VT/VF with ROSC in approximately 8 mins, currently on hypothermia protocol  · Acquired coagulopathy (prior warfarin use), possibly exacerbated by hypothermia  · Acute on chronic kidney disease, slowly improving  · Elevated LFTs, possibly from hypoperfusion secondary to arrest  · Episodes of Vtach--no recurrence, currently on amiodarone  · Myoclonus, possibly secondary to anoxic/hypoxic injury  · Brief hypotension likely secondary to combination of sedative/ paralytic - resolved  · Hx Ischemic cardiomyopathy, chronic systolic and diastolic HF, latest EF 28%  · Hx LV aneurysm with mural thrombus  · Hx CAD s/p MI  · Hx VTach w/ AICD        PLAN:   · Continue on hypothermia protocol - currently on cooling phase. Will be transitioned to rewarming this afternoon. Serial labs per protocol. · Resp - VAP bundle; titrate FiO2 to keep SpO2 > 90%. R on vent decreased to 14 due to mild alkalosis on ABG. Pulmonary hygiene. · ID - no suspicion for infectiously etiology. Trend WBC; monitor for signs of infection and consider obtaining cultures (blood/ urine/ sputum)  · CVS - Cardiology service following - defer management of Vtach (on amiodarone) and cardiac issues. Monitor hemodynamics closely  · Heme/onc - stable hgb/hct, plt. Monitor for active bleeding. Trend PT/INR - consider reversal if with signs of active bleeding     · Metabolic - trend electrolytes - expect shift of K during cooling phase causing hypokalemia. Once rewarming started - judicious replacement of K as increasing temp predisposes risk for hyperkalemia. · Renal - partida cath; strict I/O  · Endocrine - glycemic checks thru serial BMP. If glucose control necessary (glu > 180), may initiate glucostabilizer  · Neuro/ Pain/ Sedation - continue sedation with propofol, RASS goal -4 to -5 while on hypothermia protocol.  May use fentanyl cautiously +/- paralytic - if using vecuronium, titrate to TOF goal around 2  · GI - enteral nutrition once off hypothermia protocol  · Prophylaxis - GI            The patient is: [x] acutely ill Risk of deterioration: [x] moderate    [x] critically ill  [] high       January-Tamy DALTON PA-C

## 2017-07-20 NOTE — HOSPICE
Goals of care consult: Received a different number ( [phone]) from Tona Corea NP for the patient's brother Myra Washburn. Had a telephone conversation with the patient's brother who stated he and his brother had discussed if he had become incapacitated how he wanted things handled. His brother stated he and his siblings would like \"everything done\" to save their brother. But if it gets to the point where all has been done and he still doesn't recover they are willing to accept that. Still awaiting final EEG report. Per [address] RN the patient doesn't have a cough or gag reflex. He remains intubated and unresponsive.

## 2017-07-20 NOTE — PROGRESS NOTES
completed follow up visit with patient and patient's girlfriend Aristides Estrada, and offered Pastoral care, see flow sheets for interventions. Aristides Estrada said it is hard to see patient like this and it is stressful for her. She said other family is in waiting room. Patient was not responsive and  offered a silent prayer on an earlier visit a few minutes before Aristides Estrada entered room. Chaplains will continue to follow and will provide pastoral care  as needed or requested.    Villalobos Autumn, [address]  Board Certified [address] [place] [address]  Office [phone]

## 2017-07-20 NOTE — PROGRESS NOTES
NUTRITION    Nutrition Screen      RECOMMENDATIONS / PLAN:     - Once medically appropriate and patient is rewarmed, recommend starting enteral nutrition support. - Continue RD inpatient monitoring and evaluation. NUTRITION INTERVENTIONS & DIAGNOSIS:     [x] IV fluid: NS at 50 mL/hr      Nutrition Diagnosis: Inadequate oral intake related to inability to tolerate po as evidenced by NPO. ASSESSMENT:     Pt intubated on hypothermia protocol- to start rewarming at 15:00 today. OGT to continuous suction. Average po intake adequate to meet patients estimated nutritional needs:   [] Yes     [x] No   [] Unable to determine at this time    Diet: DIET NPO      Food Allergies: NKFA  Current Appetite:   [] Good     [] Fair     [] Poor     [x] Other: NPO  Appetite/meal intake prior to admission:   [] Good     [] Fair     [] Poor     [x] Other: unknown   Feeding Limitations:  [] Swallowing difficulty    [] Chewing difficulty    [x] Other: intubated   Current Meal Intake: No data found. BM: PTA   Skin Integrity: WDL  Edema: none   Pertinent Medications: Reviewed: propofol to stop per pt, insulin drip stopped      Recent Labs      07/20/17   0359  07/19/17   2119  07/19/17   1530   NA  142  141  138   K  4.1  3.6  4.4   CL  109*  109*  108   CO2  20*  17*  17*   GLU  110*  255*  368*   BUN  22*  21*  22*   CREA  1.76*  2.15*  2.14*   CA  8.3*  8.9  8.4*   MG  2.4  2.7*  2.8*   PHOS  1.8*  1.5*  2.8   ALB  2.9*  3.4  3.6   SGOT  617*  892*  686*   ALT  437*  548*  574*       Intake/Output Summary (Last 24 hours) at 07/20/17 0951  Last data filed at 07/20/17 0900   Gross per 24 hour   Intake          2549.57 ml   Output             6650 ml   Net         -4100.43 ml       Anthropometrics:  Ht Readings from Last 1 Encounters:   07/19/17 6' 1\" (1.854 m)     Last 3 Recorded Weights in this Encounter    07/19/17 1005 07/20/17 0400   Weight: 97.1 kg (214 lb) 96.4 kg (212 lb 9.6 oz)     Body mass index is 28.05 kg/(m^2). Weight History:   Weight Metrics 7/20/2017 12/29/2016 8/9/2016 7/28/2016 3/31/2016 3/17/2016 2/5/2016   Weight 212 lb 9.6 oz 215 lb 219 lb 215 lb 218 lb 218 lb 228 lb   BMI 28.05 kg/m2 28.37 kg/m2 29.7 kg/m2 29.15 kg/m2 29.56 kg/m2 29.56 kg/m2 30.92 kg/m2        Admitting Diagnosis: Cardiac arrest [facility])  Pertinent PMHx: CKD, DM, HF    Education Needs:        [x] None identified  [] Identified - Not appropriate at this time  []  Identified and addressed - refer to education log  Learning Limitations:   [] None identified  [x] Identified: intubated     Cultural, Tenriism & ethnic food preferences:  [x] None identified    [] Identified and addressed     ESTIMATED NUTRITION NEEDS:     Calories: 1752-3393 kcal ([id number]) based on  [x] Actual BW 96 kg     [] IBW   Protein: 115-192 gm (1.2-2 gm/kg) based on  [x] Actual BW      [] IBW   Fluid: 1 mL/kcal     MONITORING & EVALUATION:     Nutrition Goal(s):   1. Po intake of meals will meet >75% of patient estimated nutritional needs within the next 7 days.   Outcome:  [] Met/Ongoing    []  Not Met    [x] New/Initial Goal     Monitoring:   [] Diet tolerance   [] Meal intake   [] Supplement intake   [x] GI symptoms/ability to tolerate po diet   [x] Respiratory status   [x] Plan of care      Previous Recommendations (for follow-up assessments only):     []   Implemented       []   Not Implemented (RD to address)     [] No Recommendation Made     Discharge Planning: pending ability to tolerate po  [x] Participated in care planning, discharge planning, & interdisciplinary rounds as appropriate      Shaheed Wilson, [address], [address] [place] [address]    Pager: [phone]

## 2017-07-21 NOTE — PROGRESS NOTES
1335-Patient continues to have seizure episodes even with PRN medication. Versed gtt increased to 10mg/hr and patient is now receiving 4 mg Ativan every 2 Hours as needed. 1700-Patient continues to seize even with increased medications family and MD aware. Family supposed to go home and discuss the situation and plan tonight. 1800-cooling pads back on due to elevating temperature  1907-patient continues to seize after medication given 100 of fentanyl and seizure activity resolved.

## 2017-07-21 NOTE — CONSULTS
[facility]             [address] [facility], [address]    7/21/2017    Cheryl Blackwell is a 74 y.o.,  male, who presents with unresponsiveness. Patient was in route to the hospital for chest pain and defibrillator went off. He was unresponsive by the time he came to the emergency department. He was initially found to be pulseless but recovered to rhythm. Patient had been intubated and hypothermic protocol elicited. Patient was noted to have marked myoclonus while still in the emergency room. He underwent EEG while on sedation. No epileptiform epileptogenic discharges were noted. Neurologic consultation is requested due to persistent coma. Workup to date has included the EEG as well as a CT which is reviewed as showing no significant cerebral edema at that time.   Past medical history is remarkable for heart disease with the presence of a defibrillator apical pleural thrombus chronic kidney disease diabetes hypertension ischemic cardiomyopathy small bowel obstruction systolic heart failure    Current Facility-Administered Medications   Medication Dose Route Frequency Provider Last Rate Last Dose    LORazepam (ATIVAN) injection 2-4 mg  2-4 mg IntraVENous Q2H PRN Uriel Bloodgood, NP   4 mg at 07/21/17 1231    insulin lispro (HUMALOG) injection   SubCUTAneous Q6H Uriel Bloodgood, NP   Stopped at 07/21/17 1200    midazolam in normal saline (VERSED) 1 mg/mL infusion  0-10 mg/hr IntraVENous TITRATE Supriya Wiggins MD 10 mL/hr at 07/21/17 1335 10 mg/hr at 07/21/17 1335    fentaNYL citrate (PF) injection  mcg   mcg IntraVENous Q4H PRN Uriel Bloodgood, NP   100 mcg at 07/21/17 0400    levETIRAcetam (KEPPRA) 1,000 mg in 100 ml IVPB  1,000 mg IntraVENous Q12H Uriel Bloodgood,  mL/hr at 07/21/17 1340 1,000 mg at 07/21/17 1340    amiodarone (NEXTERONE) 360 mg in dextrose 200 mL (1.8 mg/mL) infusion  0.5-1 mg/min IntraVENous TITRATE Debra Baker MD 33.3 mL/hr at 07/21/17 1034 1 mg/min at 07/21/17 1034    0.9% sodium chloride infusion  50 mL/hr IntraVENous CONTINUOUS Xochitl Davila MD 50 mL/hr at 07/21/17 0600 50 mL/hr at 07/21/17 0600    hydroxypropyl methylcellulose (GONAK) 2.5 % ophthalmic solution 1 Drop  1 Drop Both Eyes Q12H Xochitl Davila MD   1 Drop at 07/21/17 1400    glucose chewable tablet 16 g  4 Tab Oral PRN Xochitl Davila MD        glucagon (GLUCAGEN) injection 1 mg  1 mg IntraMUSCular PRN Xochitl Davila MD        dextrose (D50W) injection syrg 12.5-25 g  25-50 mL IntraVENous PRN Xochitl Davila MD   12.5 g at 07/20/17 1314    pantoprazole (PROTONIX) 40 mg in sodium chloride 0.9 % 10 mL injection  40 mg IntraVENous DAILY Xochitl Davila MD   40 mg at 07/21/17 0805       Past Medical History:   Diagnosis Date    AICD (automatic cardioverter/defibrillator) present     Aneurysm of left ventricle of heart     with vt    Apical mural thrombus (HCC)     CKD (chronic kidney disease) stage 3, GFR 30-59 ml/min     Diabetes ([address]     IDDM    Essential hypertension, benign     Ischemic cardiomyopathy     MI (myocardial infarction) ([address]     Mixed hyperlipidemia     Other and unspecified angina pectoris ([address]     Paroxysmal ventricular tachycardia ([address]     SBO (small bowel obstruction) ([address]     Systolic heart failure ([address]        Past Surgical History:   Procedure Laterality Date    HX CATARACT REMOVAL      HX COLECTOMY  7/2003    Laparoscopic; villous adenoma    HX COLONOSCOPY  12/2015; 2010    diverticula; h/o polyps    HX HEMORRHOIDECTOMY      HX HERNIA REPAIR  87/5848    DEISY/Umbilical Hernia Repair    HX PACEMAKER  1999    ICD, 1999 with most recent pulse generator replacement in 2010     Family History   Problem Relation Age of Onset    Heart Disease Mother     Cancer Father      lung cancer    Cancer Sister     Heart Disease Brother      No Known Allergies    Review of Systems:   Review of Systems - History obtained from chart review ros unobtainable due to condition  PHYSICAL EXAMINATION:    Visit Vitals    /60    Pulse 60    Temp 97.5 °F (36.4 °C)    Resp 9    Ht 6' 1\" (1.854 m)    Wt 96.4 kg (212 lb 9.6 oz)    SpO2 100%    BMI 28.05 kg/m2     General:  Well defined, nourished,  Individual intubated overbreathing ventilator  Neck: Supple, nontender, thyroid within normal limits, no JVD, no bruits, no pain with resistance to active range of motion. Heart: Regular rate and rhythm, no murmurs, rub, or gallop. Normal S1S2. Lungs:  Clear to auscultation bilaterally with equal chest expansion, no cough, no wheeze  Musculoskeletal:  Extremities revealed no edema and had full range of motion of joints. Psych:  Good mood and normal affect    NEUROLOGICAL EXAMINATION:     Mental Status:   Unresponsive  To verbal stimulation    Cranial Nerves:    II, III, IV, VI: Unable to assess vision pupils are small pinpoint no nystagmus   Extra-ocular movements  present. Fundoscopic exam was not visualized nonystagmus. V-XII: Unable to assess hearing unable to assess oropharynx or tongue protrusion. Facial features are symmetric  Motor Examination: Normal tone, bulk, patient had muscle twitching stimulation especially both great toes to a lesser extent right hand greater than left. No cogwheel rigidity or clonus present. Sensory exam:  See above    Coordination: Myoclonic movements with stimulation    Gait and Station: Unable to assess gait or pronation. No muscle wasting or fasiculations noted. Reflexes:  Depressed  Toes nr      EEG reviewed as showing marked suppression of background activity and varying in nature which are poor prognostic features. No seizure activity or epileptiform epileptogenic activity noted. CT of head and personally reviewed imaging  Assessment and Plan:   Abdirashid Gupta is a 74 y. 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 Diane Flynn who has risk factors including cardiac arrest     Recommendations :Reviewed findings with ICU staff patient has poor prognosis for meaningful recovery    I spent   50 minutes with the patient in face-to-face consultation, of which greater than 50% was spent in counseling and coordination of care as described above.

## 2017-07-21 NOTE — INTERDISCIPLINARY ROUNDS
CRITICAL CARE INTERDISCIPLINARY ROUNDS      Patient Information:    Name:   Montez Briggs    Age:   74 y.o.     Admission Date:   7/19/2017    Readmit Risk Assessment Information:      Readmit Risk Tool Support Systems: Friends \ neighbors, Relationship with Primary Physician Group: Seen at least one time within the past 6 months    Surgery Date:      Day of Stay:     Expected Discharge Date:        Attending Provider:   Chloé Damon DO    Surgeon:        Consultant:       Primary Care Provider:   Olimpia Álvarez MD    Problem List:     Patient Active Problem List   Diagnosis Code    Hyperlipidemia E78.5    DM type 2 (diabetes mellitus, type 2) ([address] E11.9    Essential hypertension, benign I10    Ventricular tachycardia ([address] I47.2    Congestive heart failure ([address] I50.9    Other and unspecified angina pectoris I20.9    Chronic kidney disease, stage III (moderate) N18.3    Type 2 diabetes mellitus without complication ([address] L81.2    SBO (small bowel obstruction) ([address] K56.69    Type 2 diabetes mellitus with stage 2 chronic kidney disease ([address] E11.22, N18.2    Generalized ischemic myocardial dysfunction I25.5    Automatic implantable cardioverter-defibrillator in situ Z95.810    Hypertension I10    Diabetes mellitus type 1A ([address] E10.9    Atrial paroxysmal tachycardia (HCC) I47.1    CKD (chronic kidney disease) stage 3, GFR 30-59 ml/min N18.3    Diabetes ([address] H04.5    Systolic heart failure ([address] I50.20    AICD (automatic cardioverter/defibrillator) present Z95.810    Ischemic cardiomyopathy I25.5    MI (myocardial infarction) ([address] I21.3    Apical mural thrombus [id number]    Cardiac arrest ([address] I46.9       Principal Problem:  <principal problem not specified>    Procedure:       During rounds the following quality care indicators and evidence based practices were addressed :     DVT Prophylaxis, Pressure Injury Prevention, Pain Management, Sepsis resuscitation and management, Nutritional Status, Critical Care Interventions Airways, Drains and Lines and IHI Bundles: Central Line Bundle Followed , [place] Bundle Followed and Vent Bundle Followed, Vent Day 2           Acute MI/PCI:   Not applicable    Heart Failure:    Not applicable    Cardiac Surgery:  Not applicable    SCIP Measures for other Surgeries:   Not applicable    Pneumonia:    Appropriate Antibiotic Selection (ICU versus Non-ICU)    Stroke:  Patient's Personal Risk Factors for Stroke are:   hypertension, family history, hyperlipidemia or diabetes mellitus    NIH Stroke Score       Transfer Level of Care:  Not Ready for Transfer    The patient will require the following interventions based on the Readmission Risk Assessment:  Pharmacy evaluation and teaching, Care Management involvement for home health follow up for:  mobility and assistance with ADL's and Spiritual Care evaluation      Discharge Management:  Home    Anticipated Discharge Date:  3      Interdisciplinary team rounds were held  with the following team membersCare Management, Diabetes Treatment Specialist, Nursing, Nutrition, Pastoral Care, Pharmacy, Physician and Clinical Coordinator and the  patient and sibling(s). Plan of care discussed. See clinical pathway and/or care plan for interventions and desired outcomes. Discontinue insulin drip, start feeding and have family discussion.

## 2017-07-21 NOTE — PROGRESS NOTES
Hospitalist Progress Note    Patient: Geneva Lamas Age: 74 y.o. : 1950 MR#: [id number] SSN: [id number]  Date/Time: 2017 5:45 PM    Subjective:     Review of Systems -   General ROS:          Objective:   VS:   Visit Vitals    /62    Pulse 87    Temp 99.3 °F (37.4 °C)    Resp 26    Ht 6' 1\" (1.854 m)    Wt 96.4 kg (212 lb 9.6 oz)    SpO2 97%    BMI 28.05 kg/m2      Tmax/24hrs: Temp (24hrs), Av.1 °F (35.1 °C), Min:91.6 °F (33.1 °C), Max:99.3 °F (37.4 °C)     Intake/Output Summary (Last 24 hours) at 17 1745  Last data filed at 17 1717   Gross per 24 hour   Intake           2541.3 ml   Output              920 ml   Net           1621.3 ml       General:  Sedated  HEENT:no scleral icterus  Cardiovascular:  Heart regular  Pulmonary:  Good air entry, ET tube to entilator  GI:  Hypoactive sounds, soft  Extremities: no edema, positive pulses   Additional:    Current Facility-Administered Medications   Medication Dose Route Frequency    LORazepam (ATIVAN) injection 2-4 mg  2-4 mg IntraVENous Q2H PRN    insulin lispro (HUMALOG) injection   SubCUTAneous Q6H    midazolam in normal saline (VERSED) 1 mg/mL infusion  0-10 mg/hr IntraVENous TITRATE    [START ON 2017] levETIRAcetam (KEPPRA) 500 mg in 100 ml IVPB  500 mg IntraVENous Q12H    fentaNYL citrate (PF) injection  mcg   mcg IntraVENous Q4H PRN    amiodarone (NEXTERONE) 360 mg in dextrose 200 mL (1.8 mg/mL) infusion  0.5 mg/min IntraVENous TITRATE    0.9% sodium chloride infusion  50 mL/hr IntraVENous CONTINUOUS    hydroxypropyl methylcellulose (GONAK) 2.5 % ophthalmic solution 1 Drop  1 Drop Both Eyes Q12H    glucose chewable tablet 16 g  4 Tab Oral PRN    glucagon (GLUCAGEN) injection 1 mg  1 mg IntraMUSCular PRN    dextrose (D50W) injection syrg 12.5-25 g  25-50 mL IntraVENous PRN    pantoprazole (PROTONIX) 40 mg in sodium chloride 0.9 % 10 mL injection  40 mg IntraVENous DAILY         Labs:    Recent Results (from the past 24 hour(s))   GLUCOSE, POC    Collection Time: 07/20/17  6:09 PM   Result Value Ref Range    Glucose (POC) 168 (H) 70 - 110 mg/dL   GLUCOSTABILIZER    Collection Time: 07/20/17  6:12 PM   Result Value Ref Range    Glucose 168 mg/dL    Insulin order 3.2 units/hour    Insulin adminstered 3.2 units/hour    Multiplier 0.030     Low target 140 mg/dL    High target 180 mg/dL    D50 order 0.0 ml    D50 administered 0.00 ml    Minutes until next BG 60 min    Order initials lk     Administered initials lk    GLUCOSE, POC    Collection Time: 07/20/17  7:21 PM   Result Value Ref Range    Glucose (POC) 159 (H) 70 - 110 mg/dL   GLUCOSTABILIZER    Collection Time: 07/20/17  7:21 PM   Result Value Ref Range    Glucose 159 mg/dL    Insulin order 3.0 units/hour    Insulin adminstered 3.0 units/hour    Multiplier 0.030     Low target 140 mg/dL    High target 180 mg/dL    D50 order 0.0 ml    D50 administered 0.00 ml    Minutes until next BG 60 min    Order initials lk     Administered initials lk    GLUCOSE, POC    Collection Time: 07/20/17  8:00 PM   Result Value Ref Range    Glucose (POC) 160 (H) 70 - 110 mg/dL   GLUCOSTABILIZER    Collection Time: 07/20/17  8:01 PM   Result Value Ref Range    Glucose 160 mg/dL    Insulin order 3.0 units/hour    Insulin adminstered 3.0 units/hour    Multiplier 0.030     Low target 140 mg/dL    High target 180 mg/dL    D50 order 0.0 ml    D50 administered 0.00 ml    Minutes until next BG 60 min    Order initials cf     Administered initials cf    GLUCOSE, POC    Collection Time: 07/20/17  8:59 PM   Result Value Ref Range    Glucose (POC) 148 (H) 70 - 110 mg/dL   GLUCOSTABILIZER    Collection Time: 07/20/17  9:00 PM   Result Value Ref Range    Glucose 148 mg/dL    Insulin order 2.6 units/hour    Insulin adminstered 2.6 units/hour    Multiplier 0.030     Low target 140 mg/dL    High target 180 mg/dL    D50 order 0.0 ml    D50 administered 0.00 ml    Minutes until next BG 60 min Order initials cf     Administered initials cf    GLUCOSE, POC    Collection Time: 07/20/17  9:58 PM   Result Value Ref Range    Glucose (POC) 120 (H) 70 - 110 mg/dL   GLUCOSTABILIZER    Collection Time: 07/20/17  9:59 PM   Result Value Ref Range    Glucose 120 mg/dL    Insulin order 1.2 units/hour    Insulin adminstered 1.2 units/hour    Multiplier 0.020     Low target 140 mg/dL    High target 180 mg/dL    D50 order 0.0 ml    D50 administered 0.00 ml    Minutes until next BG 60 min    Order initials cf     Administered initials cf    CBC WITH AUTOMATED DIFF    Collection Time: 07/20/17 10:00 PM   Result Value Ref Range    WBC 16.2 (H) 4.6 - 13.2 K/uL    RBC 4.23 (L) 4.70 - 5.50 M/uL    HGB 11.6 (L) 13.0 - 16.0 g/dL    HCT 34.4 (L) 36.0 - 48.0 %    MCV 81.3 74.0 - 97.0 FL    MCH 27.4 24.0 - 34.0 PG    MCHC 33.7 31.0 - 37.0 g/dL    RDW 14.2 11.6 - 14.5 %    PLATELET 333 030 - 873 K/uL    MPV 9.5 9.2 - 11.8 FL    NEUTROPHILS 90 (H) 40 - 73 %    LYMPHOCYTES 6 (L) 21 - 52 %    MONOCYTES 4 3 - 10 %    EOSINOPHILS 0 0 - 5 %    BASOPHILS 0 0 - 2 %    ABS. NEUTROPHILS 14.5 (H) 1.8 - 8.0 K/UL    ABS. LYMPHOCYTES 1.0 0.9 - 3.6 K/UL    ABS. MONOCYTES 0.7 0.05 - 1.2 K/UL    ABS. EOSINOPHILS 0.0 0.0 - 0.4 K/UL    ABS. BASOPHILS 0.0 0.0 - 0.1 K/UL    DF AUTOMATED     METABOLIC PANEL, COMPREHENSIVE    Collection Time: 07/20/17 10:00 PM   Result Value Ref Range    Sodium 141 136 - 145 mmol/L    Potassium 4.1 3.5 - 5.5 mmol/L    Chloride 110 (H) 100 - 108 mmol/L    CO2 21 21 - 32 mmol/L    Anion gap 10 3.0 - 18 mmol/L    Glucose 127 (H) 74 - 99 mg/dL    BUN 20 (H) 7.0 - 18 MG/DL    Creatinine 1.63 (H) 0.6 - 1.3 MG/DL    BUN/Creatinine ratio 12 12 - 20      GFR est AA 52 (L) >60 ml/min/1.73m2    GFR est non-AA 43 (L) >60 ml/min/1.73m2    Calcium 8.4 (L) 8.5 - 10.1 MG/DL    Bilirubin, total 0.2 0.2 - 1.0 MG/DL    ALT (SGPT) 386 (H) 16 - 61 U/L    AST (SGOT) 257 (H) 15 - 37 U/L    Alk.  phosphatase 91 45 - 117 U/L    Protein, total 7.2 6.4 - 8.2 g/dL    Albumin 3.1 (L) 3.4 - 5.0 g/dL    Globulin 4.1 (H) 2.0 - 4.0 g/dL    A-G Ratio 0.8 0.8 - 1.7     PTT    Collection Time: 07/20/17 10:00 PM   Result Value Ref Range    aPTT 71.7 (H) 23.0 - 36.4 SEC   PROTHROMBIN TIME + INR    Collection Time: 07/20/17 10:00 PM   Result Value Ref Range    Prothrombin time 43.5 (H) 11.5 - 15.2 sec    INR 5.0 (H) 0.8 - 1.2     MAGNESIUM    Collection Time: 07/20/17 10:00 PM   Result Value Ref Range    Magnesium 2.3 1.6 - 2.6 mg/dL   PHOSPHORUS    Collection Time: 07/20/17 10:00 PM   Result Value Ref Range    Phosphorus 3.4 2.5 - 4.9 MG/DL   CALCIUM, IONIZED    Collection Time: 07/20/17 10:00 PM   Result Value Ref Range    Ionized Calcium 1.15 1.12 - 1.32 MMOL/L   LACTIC ACID    Collection Time: 07/20/17 10:00 PM   Result Value Ref Range    Lactic acid 1.9 0.4 - 2.0 MMOL/L   POC G3    Collection Time: 07/20/17 10:22 PM   Result Value Ref Range    Device: VENT      FIO2 (POC) 0.35 %    pH (POC) 7.384 7.35 - 7.45      pCO2 (POC) 34.8 (L) 35.0 - 45.0 MMHG    pO2 (POC) 86 80 - 100 MMHG    HCO3 (POC) 21.7 (L) 22 - 26 MMOL/L    sO2 (POC) 98 (H) 92 - 97 %    Base deficit (POC) 4 mmol/L    Mode ASSIST CONTROL      Set Rate 14 bpm    PEEP/CPAP (POC) 5.0 cmH2O    PIP (POC) 13      Allens test (POC) YES      Inspiratory Time 1.00 sec    Total resp.  rate 15      Site DRAWN FROM ARTERIAL LINE      Patient temp. 33.2      Specimen type (POC) ARTERIAL      Performed by Crystal Giordano     Pressure control YES     GLUCOSE, POC    Collection Time: 07/20/17 10:59 PM   Result Value Ref Range    Glucose (POC) 130 (H) 70 - 110 mg/dL   GLUCOSTABILIZER    Collection Time: 07/20/17 10:59 PM   Result Value Ref Range    Glucose 130 mg/dL    Insulin order 0.7 units/hour    Insulin adminstered 0.7 units/hour    Multiplier 0.010     Low target 140 mg/dL    High target 180 mg/dL    D50 order 0.0 ml    D50 administered 0.00 ml    Minutes until next BG 60 min    Order initials cf     Administered initials cf    GLUCOSE, POC    Collection Time: 07/20/17 11:58 PM   Result Value Ref Range    Glucose (POC) 108 70 - 110 mg/dL   GLUCOSTABILIZER    Collection Time: 07/20/17 11:58 PM   Result Value Ref Range    Glucose 108 mg/dL    Insulin order 0.0 units/hour    Insulin adminstered 0.0 units/hour    Multiplier 0.000     Low target 140 mg/dL    High target 180 mg/dL    D50 order 0.0 ml    D50 administered 0.00 ml    Minutes until next BG 60 min    Order initials cf     Administered initials cf    GLUCOSE, POC    Collection Time: 07/21/17 12:57 AM   Result Value Ref Range    Glucose (POC) 152 (H) 70 - 110 mg/dL   GLUCOSTABILIZER    Collection Time: 07/21/17 12:58 AM   Result Value Ref Range    Glucose 152 mg/dL    Insulin order 0.0 units/hour    Insulin adminstered 0.0 units/hour    Multiplier 0.000     Low target 140 mg/dL    High target 180 mg/dL    D50 order 0.0 ml    D50 administered 0.00 ml    Minutes until next BG 60 min    Order initials cf     Administered initials cf    GLUCOSE, POC    Collection Time: 07/21/17  2:00 AM   Result Value Ref Range    Glucose (POC) 161 (H) 70 - 110 mg/dL   GLUCOSTABILIZER    Collection Time: 07/21/17  2:00 AM   Result Value Ref Range    Glucose 161 mg/dL    Insulin order 0.0 units/hour    Insulin adminstered 0.0 units/hour    Multiplier 0.000     Low target 140 mg/dL    High target 180 mg/dL    D50 order 0.0 ml    D50 administered 0.00 ml    Minutes until next BG 60 min    Order initials cf     Administered initials cf    GLUCOSE, POC    Collection Time: 07/21/17  2:59 AM   Result Value Ref Range    Glucose (POC) 170 (H) 70 - 110 mg/dL   GLUCOSTABILIZER    Collection Time: 07/21/17  3:05 AM   Result Value Ref Range    Glucose 170 mg/dL    Insulin order 0.0 units/hour    Insulin adminstered 0.0 units/hour    Multiplier 0.000     Low target 140 mg/dL    High target 180 mg/dL    D50 order 0.0 ml    D50 administered 0.00 ml    Minutes until next BG 60 min    Order initials cf Administered initials cf    POC G3    Collection Time: 07/21/17  3:56 AM   Result Value Ref Range    Device: VENT      FIO2 (POC) 35 %    pH (POC) 7.383 7.35 - 7.45      pCO2 (POC) 34.5 (L) 35.0 - 45.0 MMHG    pO2 (POC) 88 80 - 100 MMHG    HCO3 (POC) 20.6 (L) 22 - 26 MMOL/L    sO2 (POC) 97 92 - 97 %    Base deficit (POC) 4 mmol/L    Mode ASSIST CONTROL      Set Rate 14 bpm    PEEP/CPAP (POC) 5 cmH2O    PIP (POC) 13      Allens test (POC) N/A      Total resp.  rate 19      Site DRAWN FROM ARTERIAL LINE      Specimen type (POC) ARTERIAL      Performed by Jurgen Sol     Pressure control YES     GLUCOSE, POC    Collection Time: 07/21/17  3:59 AM   Result Value Ref Range    Glucose (POC) 161 (H) 70 - 110 mg/dL   GLUCOSTABILIZER    Collection Time: 07/21/17  4:00 AM   Result Value Ref Range    Glucose 161 mg/dL    Insulin order 0.0 units/hour    Insulin adminstered 0.0 units/hour    Multiplier 0.000     Low target 140 mg/dL    High target 180 mg/dL    D50 order 0.0 ml    D50 administered 0.00 ml    Minutes until next BG 60 min    Order initials cf     Administered initials cf    GLUCOSE, POC    Collection Time: 07/21/17  4:58 AM   Result Value Ref Range    Glucose (POC) 180 (H) 70 - 110 mg/dL   GLUCOSTABILIZER    Collection Time: 07/21/17  4:59 AM   Result Value Ref Range    Glucose 180 mg/dL    Insulin order 0.0 units/hour    Insulin adminstered 0.0 units/hour    Multiplier 0.000     Low target 140 mg/dL    High target 180 mg/dL    D50 order 0.0 ml    D50 administered 0.00 ml    Minutes until next BG 60 min    Order initials cf     Administered initials cf    GLUCOSE, POC    Collection Time: 07/21/17  5:57 AM   Result Value Ref Range    Glucose (POC) 192 (H) 70 - 110 mg/dL   GLUCOSTABILIZER    Collection Time: 07/21/17  5:58 AM   Result Value Ref Range    Glucose 192 mg/dL    Insulin order 0.0 units/hour    Insulin adminstered 0.0 units/hour    Multiplier 0.000     Low target 140 mg/dL    High target 180 mg/dL    D50 order 0.0 ml    D50 administered 0.00 ml    Minutes until next BG 60 min    Order initials cf     Administered initials cf    CBC WITH AUTOMATED DIFF    Collection Time: 07/21/17  6:20 AM   Result Value Ref Range    WBC 13.9 (H) 4.6 - 13.2 K/uL    RBC 3.95 (L) 4.70 - 5.50 M/uL    HGB 10.6 (L) 13.0 - 16.0 g/dL    HCT 32.6 (L) 36.0 - 48.0 %    MCV 82.5 74.0 - 97.0 FL    MCH 26.8 24.0 - 34.0 PG    MCHC 32.5 31.0 - 37.0 g/dL    RDW 14.4 11.6 - 14.5 %    PLATELET 230 299 - 303 K/uL    MPV 10.0 9.2 - 11.8 FL    NEUTROPHILS 91 (H) 40 - 73 %    LYMPHOCYTES 4 (L) 21 - 52 %    MONOCYTES 5 3 - 10 %    EOSINOPHILS 0 0 - 5 %    BASOPHILS 0 0 - 2 %    ABS. NEUTROPHILS 12.7 (H) 1.8 - 8.0 K/UL    ABS. LYMPHOCYTES 0.5 (L) 0.9 - 3.6 K/UL    ABS. MONOCYTES 0.7 0.05 - 1.2 K/UL    ABS. EOSINOPHILS 0.0 0.0 - 0.4 K/UL    ABS. BASOPHILS 0.0 0.0 - 0.06 K/UL    DF AUTOMATED     METABOLIC PANEL, COMPREHENSIVE    Collection Time: 07/21/17  6:20 AM   Result Value Ref Range    Sodium 139 136 - 145 mmol/L    Potassium 4.6 3.5 - 5.5 mmol/L    Chloride 107 100 - 108 mmol/L    CO2 21 21 - 32 mmol/L    Anion gap 11 3.0 - 18 mmol/L    Glucose 196 (H) 74 - 99 mg/dL    BUN 21 (H) 7.0 - 18 MG/DL    Creatinine 2.11 (H) 0.6 - 1.3 MG/DL    BUN/Creatinine ratio 10 (L) 12 - 20      GFR est AA 38 (L) >60 ml/min/1.73m2    GFR est non-AA 32 (L) >60 ml/min/1.73m2    Calcium 8.1 (L) 8.5 - 10.1 MG/DL    Bilirubin, total 0.3 0.2 - 1.0 MG/DL    ALT (SGPT) 316 (H) 16 - 61 U/L    AST (SGOT) 173 (H) 15 - 37 U/L    Alk.  phosphatase 83 45 - 117 U/L    Protein, total 6.6 6.4 - 8.2 g/dL    Albumin 2.9 (L) 3.4 - 5.0 g/dL    Globulin 3.7 2.0 - 4.0 g/dL    A-G Ratio 0.8 0.8 - 1.7     PTT    Collection Time: 07/21/17  6:20 AM   Result Value Ref Range    aPTT 79.0 (H) 23.0 - 36.4 SEC   PROTHROMBIN TIME + INR    Collection Time: 07/21/17  6:20 AM   Result Value Ref Range    Prothrombin time 47.4 (H) 11.5 - 15.2 sec    INR 5.6 (HH) 0.8 - 1.2     MAGNESIUM    Collection Time: 07/21/17  6:20 AM   Result Value Ref Range    Magnesium 2.1 1.6 - 2.6 mg/dL   PHOSPHORUS    Collection Time: 07/21/17  6:20 AM   Result Value Ref Range    Phosphorus 3.9 2.5 - 4.9 MG/DL   CALCIUM, IONIZED    Collection Time: 07/21/17  6:20 AM   Result Value Ref Range    Ionized Calcium 1.10 (L) 1.12 - 1.32 MMOL/L   LACTIC ACID    Collection Time: 07/21/17  6:20 AM   Result Value Ref Range    Lactic acid 1.2 0.4 - 2.0 MMOL/L   GLUCOSE, POC    Collection Time: 07/21/17  6:55 AM   Result Value Ref Range    Glucose (POC) 190 (H) 70 - 110 mg/dL   GLUCOSTABILIZER    Collection Time: 07/21/17  6:56 AM   Result Value Ref Range    Glucose 190 mg/dL    Insulin order 1.3 units/hour    Insulin adminstered 1.3 units/hour    Multiplier 0.010     Low target 140 mg/dL    High target 180 mg/dL    D50 order 0.0 ml    D50 administered 0.00 ml    Minutes until next BG 60 min    Order initials cf     Administered initials cf    EKG, 12 LEAD, SUBSEQUENT    Collection Time: 07/21/17  7:15 AM   Result Value Ref Range    Ventricular Rate 60 BPM    Atrial Rate 60 BPM    P-R Interval 264 ms    QRS Duration 110 ms    Q-T Interval 556 ms    QTC Calculation (Bezet) 556 ms    Calculated P Axis 95 degrees    Calculated R Axis 116 degrees    Calculated T Axis -121 degrees    Diagnosis         Electronic atrial pacemaker  Low voltage QRS  Anterolateral infarct , age undetermined  Prolonged QT  Abnormal ECG  When compared with ECG of 20-JUL-2017 07:48,  Electronic ventricular pacemaker is no longer apparent  Confirmed by Brook Duque MD ([id number]) on 7/21/2017 12:04:45 PM     GLUCOSE, POC    Collection Time: 07/21/17  8:01 AM   Result Value Ref Range    Glucose (POC) 169 (H) 70 - 110 mg/dL   GLUCOSTABILIZER    Collection Time: 07/21/17  8:02 AM   Result Value Ref Range    Glucose 169 mg/dL    Insulin order 1.1 units/hour    Insulin adminstered 1.1 units/hour    Multiplier 0.010     Low target 140 mg/dL    High target 180 mg/dL    D50 order 0.0 ml D50 administered 0.00 ml    Minutes until next BG 60 min    Order initials bm     Administered initials bm    GLUCOSE, POC    Collection Time: 07/21/17  9:00 AM   Result Value Ref Range    Glucose (POC) 157 (H) 70 - 110 mg/dL   GLUCOSTABILIZER    Collection Time: 07/21/17  9:01 AM   Result Value Ref Range    Glucose 157 mg/dL    Insulin order 1.0 units/hour    Insulin adminstered 1.0 units/hour    Multiplier 0.010     Low target 140 mg/dL    High target 180 mg/dL    D50 order 0.0 ml    D50 administered 0.00 ml    Minutes until next BG 60 min    Order initials bm     Administered initials bm    GLUCOSE, POC    Collection Time: 07/21/17 10:06 AM   Result Value Ref Range    Glucose (POC) 145 (H) 70 - 110 mg/dL   GLUCOSTABILIZER    Collection Time: 07/21/17 10:07 AM   Result Value Ref Range    Glucose 145 mg/dL    Insulin order 0.9 units/hour    Insulin adminstered 0.9 units/hour    Multiplier 0.010     Low target 140 mg/dL    High target 180 mg/dL    D50 order 0.0 ml    D50 administered 0.00 ml    Minutes until next BG 60 min    Order initials bm     Administered initials bm    GLUCOSE, POC    Collection Time: 07/21/17 11:52 AM   Result Value Ref Range    Glucose (POC) 142 (H) 70 - 110 mg/dL       Imaging:  Xr Chest Port    Result Date: 7/21/2017  Portable erect AP view progress study to evaluate ET tube and line placement, comparison 7/20/2017 at 5:32 AM. There is cardiomegaly. There is a dual lead pacemaker AICD with pacer pack obscuring part of the upper left chest. No new infiltrates or pleural effusions are seen. Vasculature is well-defined. Endotracheal tube is present with the tip of the tube about 4 cm above the susana. Nasogastric tube is present, entering the stomach and terminating below the edge of the image in the gastric fundus or distal to it. IMPRESSION: 1. Stable chest with no acute disease 2. Cardiomegaly 3. Good position of ET tube, nasogastric tube, pacemaker and leads.       Assessment/Plan: Ischemic cardiomyopathy with AICD in place to have reported firing multiple times resulting in Cardiac arrest 7/19/2017  Remains unresponsive after rewarming  VDRL pulmonary intensivist follow appreciated  Blood cultures resulting in positive staph species possibly a second staph species after 2 days, likely contaminant, no suspicion for infectious etiology for intensivist    Stable H&H    Additional Notes:      Full Code    Disposition:   Grave prognosis      Case discussed with:  []Patient  []Family  [x]Nursing  []Case Management  DVT Prophylaxis:  []Lovenox  []Hep SQ  []SCDs  []Coumadin   []On Heparin gtt    Signed By: Colletta Romans, DO     July 21, 2017 5:45 PM

## 2017-07-21 NOTE — DIABETES MGMT
GLYCEMIC CONTROL PLAN OF CARE    Assessment/Recommendations:  Pt discussed in interdisciplinary rounds this morning. Recommend continue glucostablizer until pt is rewarmed and ready to be transitioned off. Trickle TF to be started once pt is rewarmed, recommend place pt on correctional lispro protocol every 6 hours and monitor for need to add basal insulin based on BG trends. Will continue to monitor inpatient for intervention. Most recent blood glucose values:  Results for Viviana Yi (MRN [id number]) as of 7/21/2017 14:24   Ref.  Range 7/21/2017 08:01 7/21/2017 09:00 7/21/2017 10:06 7/21/2017 11:52   GLUCOSE,FAST - POC Latest Ref Range: 70 - 110 mg/dL 169 (H) 157 (H) 145 (H) 142 (H)     Current hospital diabetes medications:  glucostablizer last drip rate of 0 units/ hr    Previous day Insulin TDD:  3.4 units of regular insulin via glucostablizer    Diet:   Once pt is rewarmed he is to start Glucerna TF trickle with goal of 60 mL/hr + 150 mL q 6 hour water flushes to provide: 2160 kcal, 119 gm protein, 108 gm fat, 192 gm CHO, 23 gm fiber, 1093 mL free water, 100% RDIs    Home diabetes medications:  10 mg glipizide  25 units NPH two times daily    Goals:  Pt BG will be within target range by _7/24/17____    Education:  ___  Refer to Diabetes Education Record             _x__  Education not indicated at this time    Ignacia Campbell [address], [address] [place] [address], [facility] [place]  Pager: [phone]

## 2017-07-21 NOTE — PROGRESS NOTES
[facility] - Progress Note  Admit Date: 7/19/2017    Assessment:     -Cardiac arrest, arrived unresponsive, pulseless VT, s/p shock 10 timed by AICD and external shock, currently intubated and unresponsive on hypothermia protocol. Device interrogation revealed appropriate shocks for VT storm. No further events since arrest.  -Encephalopathy with seizure activity, anoxic brain injury versus possible neurologic event.  -History of VT s/p dual chamber [employer] AICD. Patient is followed by Dr. Federica Noriega, Dr. Jeni Rodriguez, Dr. Arvin Ganser. Previously scheduled for VT ablation, but cancelled due to apical thrombus. Maintained on amiodarone, mexiletine and BB. Device working appropriately on interrogation this admission.  -Elevated troponin, suspect secondary to VT with multiple shocks but known CAD as noted below. Not currently candidate for further coronary work-up.  -H/o apical thrombus, present on echo 12/2016. Maintained on warfarin, INR 2.7 on arrival. No evidence of thrombus on echo this admission.  -Ischemic cardiomyopathy EF 30% on echo this admission unchanged from 12/2016. Not currently decompensated. -CAD s/p history of remote anterior MI, previously followed by Dr. Tong Escobedo. -Paroxysmal atrial fibrillation on remote AICD check.  -Hypertension. Low normal at present.  -Diabetes. On insulin. Hgb A1c 7.8.  -Dyslipidemia. On Crestor.  -Acute on chronic kidney disease.  -Elevated LFTs. -Coagulopathy with INR trending upward despite holding warfarin.      Patient is followed by Dr. Federica Noriega, Dr. Jeni Rodriguez, Dr. Arvin Ganser. Plan:     I will decrease amiodarone to 0.5 mg/min. Poor prognosis from neurological standpoint. Subjective:     No sustained VT by telemetry. Intubated.       Objective:      Patient Vitals for the past 8 hrs:   Temp Pulse Resp BP SpO2   07/21/17 1300 - 60 9 103/60 100 %   07/21/17 1230 - 60 20 - 100 %   07/21/17 1210 - 60 20 - 100 %   07/21/17 1200 97.5 °F (36.4 °C) 60 19 104/61 100 %   07/21/17 1130 - 60 19 109/60 100 %   07/21/17 1100 97.5 °F (36.4 °C) 60 14 108/58 100 %   07/21/17 1030 - 61 14 104/62 100 %   07/21/17 1000 97.3 °F (36.3 °C) 60 16 102/58 100 %   07/21/17 0930 - 60 14 91/56 100 %   07/21/17 0900 96.6 °F (35.9 °C) 60 15 96/58 100 %   07/21/17 0835 - 60 16 - 100 %   07/21/17 0830 - 60 18 101/60 100 %   07/21/17 0800 96.6 °F (35.9 °C) 66 20 97/58 100 %   07/21/17 0730 - 60 14 (!) 85/54 100 %   07/21/17 0700 95.5 °F (35.3 °C) 60 14 95/55 100 %         Patient Vitals for the past 96 hrs:   Weight   07/20/17 0400 96.4 kg (212 lb 9.6 oz)   07/19/17 1005 97.1 kg (214 lb)                    Intake/Output Summary (Last 24 hours) at 07/21/17 1455  Last data filed at 07/21/17 1200   Gross per 24 hour   Intake          2521.31 ml   Output             1205 ml   Net          1316.31 ml       Physical Exam:  General:  Intubated, not resopnsive  Neck:  nontender  Lungs:  decreased  Heart:  regular rate and rhythm, S1, S2 normal, no murmur, click, rub or gallop  Abdomen:  abdomen is soft without significant tenderness, masses, organomegaly or guarding  Extremities:  extremities normal, atraumatic, no cyanosis or edema    Data Review:     Labs: Results:       Chemistry Recent Labs      07/21/17   0620  07/20/17   2200  07/20/17   1700   GLU  196*  127*  206*   NA  139  141  140   K  4.6  4.1  4.3   CL  107  110*  109*   CO2  21  21  22   BUN  21*  20*  18   CREA  2.11*  1.63*  1.41*   CA  8.1*  8.4*  8.4*   MG  2.1  2.3  2.1   PHOS  3.9  3.4  2.7   AGAP  11  10  9   BUCR  10*  12  13   AP  83  91  86   TP  6.6  7.2  6.6   ALB  2.9*  3.1*  3.2*   GLOB  3.7  4.1*  3.4   AGRAT  0.8  0.8  0.9      CBC w/Diff Recent Labs      07/21/17   0620  07/20/17   2200  07/20/17   1700   WBC  13.9*  16.2*  14.3*   RBC  3.95*  4.23*  4.19*   HGB  10.6*  11.6*  11.3*   HCT  32.6*  34.4*  34.0*   PLT  232  233  244   GRANS  91*  90*  92*   LYMPH  4*  6*  4*   EOS  0  0  0      Cardiac Enzymes No results found for: CPK, CKMMB, CKMB, RCK3, CKMBT, CKNDX, CKND1, LAMONT, TROPT, TROIQ, LISA, TROPT, TNIPOC, BNP, BNPP   Coagulation Recent Labs      07/21/17   0620  07/20/17   2200   PTP  47.4*  43.5*   INR  5.6*  5.0*   APTT  79.0*  71.7*       Lipid Panel Lab Results   Component Value Date/Time    Cholesterol, total 206 09/21/2016 01:24 PM    HDL Cholesterol 51 09/21/2016 01:24 PM    LDL, calculated 129 09/21/2016 01:24 PM    VLDL, calculated 26 09/21/2016 01:24 PM    Triglyceride 130 09/21/2016 01:24 PM    CHOL/HDL Ratio 4.0 09/21/2016 01:24 PM      BNP No results found for: BNP, BNPP, XBNPT   Liver Enzymes Recent Labs      07/21/17   0620   TP  6.6   ALB  2.9*   AP  83   SGOT  173*      Digoxin    Thyroid Studies No results found for: T4, T3U, TSH, TSHEXT       Signed By: Sumit Bradshaw MD     July 21, 2017

## 2017-07-21 NOTE — PROGRESS NOTES
[facility]  ICU Progress Note      Name: Evorn Hodgkins   : 1950   MRN: [id number]   Date: 2017      [x]I have reviewed the flowsheet and previous days notes. Events overnight reviewed and discussed with nursing staff. Vital signs and records reviewed. Subjective:  73 y/o male admitted to ICU following cardiac arrest, VT/VF with ROSC. Hypothermia protocol now rewarmed    No recurrence of Vtach/Vfib overnight   Off paralysis but on Versed for myoclous  Pt had a short episode of hypotension while on propofol, fentanyl and paralytic. Urine output adequate - was having polyuria on admission however resolved  Secretions thru ETT scant in amount. [x]The patient is unable to give any meaningful history or review of systems because the patient is:  [x]Intubated []Sedated   []Unresponsive      [x]The patient is critically ill on      [x]Mechanical ventilation [x]Pressors   []BiPAP []                 ROS:Review of systems not obtained due to patient factors.     Medication Review:  · Pressors - none  · Sedation - propofol gtt  · Antibiotics - none  · Pain - fentanyl gtt (currently placed on hold)  · GI/ DVT - protonix  · Others (other gtts)    Safety Bundles: VAP Bundle/ CAUTI/ Electrolyte Replacement Protocol    Vital Signs:    Visit Vitals    BP (P) 102/58 (BP 1 Location: Left arm, BP Patient Position: At rest)    Pulse 60    Temp (P) 99.2 °F (37.3 °C)    Resp 17    Ht 6' 1\" (1.854 m)    Wt 96.4 kg (212 lb 9.6 oz)    SpO2 99%    BMI 28.05 kg/m2       O2 Device: (P) Ventilator       Temp (24hrs), Av.8 °F (34.9 °C), Min:91.6 °F (33.1 °C), Max:99.2 °F (37.3 °C)       Intake/Output:   Last shift:       07 - 1900  In: 801.5 [I.V.:801.5]  Out: 25 [Urine:25]  Last 3 shifts: 1901 -  0700  In: 3976.9 [I.V.:3856.9]  Out: 3655 [Urine:2755]    Intake/Output Summary (Last 24 hours) at 17 1617  Last data filed at 17 1340   Gross per 24 hour Intake          2391.94 ml   Output             1045 ml   Net          1346.94 ml       Ventilator Settings:  Ventilator  Mode: Pressure control  Respiratory Rate  Back-Up Rate: 14  Insp Time (sec): 1 sec  I:E Ratio: 1:2.9  Ventilator Volumes  Vt Set (ml): 450 ml  Vt Exhaled (Machine Breath) (ml): 562 ml  Vt Spont (ml): 476 ml  Ve Observed (l/min): 11.7 l/min  Ventilator Pressures  PC Set: 13  PIP Observed (cm H2O): 19 cm H2O  Plateau Pressure (cm H2O): 16 cm H2O  MAP (cm H2O): 9.4  PEEP/VENT (cm H2O): 5 cm H20      Physical Exam:    General: intubated, sedated    HEENT:  Anicteric sclerae; pink palpebral conjunctivae; mucosa moist  Resp:  Symmetrical chest expansion, no accessory muscle use; good airway entry; auscultation somewhat limited due to cooling blanket covering the chest wall - no rales/ wheezing/ rhonchi noted on limited space for auscultation  CV:  S1, S2 present; regular rate and rhythm  GI:  Abdomen soft, non-tender; hypoactive bowel sounds  Extremities:  +2 pulses on all extremities; no edema/ cyanosis/ clubbing noted  Skin:  Cool to touch; no rashes/ lesions noted  Neurologic:  Sedated, paralyzed   Devices:    7/19/17 -- ETT with madi; right femoral CVC; right arterial line      DATA:     Current Facility-Administered Medications   Medication Dose Route Frequency    LORazepam (ATIVAN) injection 2-4 mg  2-4 mg IntraVENous Q2H PRN    insulin lispro (HUMALOG) injection   SubCUTAneous Q6H    midazolam in normal saline (VERSED) 1 mg/mL infusion  0-10 mg/hr IntraVENous TITRATE    [START ON 7/22/2017] levETIRAcetam (KEPPRA) 500 mg in 100 ml IVPB  500 mg IntraVENous Q12H    fentaNYL citrate (PF) injection  mcg   mcg IntraVENous Q4H PRN    amiodarone (NEXTERONE) 360 mg in dextrose 200 mL (1.8 mg/mL) infusion  0.5 mg/min IntraVENous TITRATE    0.9% sodium chloride infusion  50 mL/hr IntraVENous CONTINUOUS    hydroxypropyl methylcellulose (GONAK) 2.5 % ophthalmic solution 1 Drop  1 Drop Both Eyes Q12H    glucose chewable tablet 16 g  4 Tab Oral PRN    glucagon (GLUCAGEN) injection 1 mg  1 mg IntraMUSCular PRN    dextrose (D50W) injection syrg 12.5-25 g  25-50 mL IntraVENous PRN    pantoprazole (PROTONIX) 40 mg in sodium chloride 0.9 % 10 mL injection  40 mg IntraVENous DAILY         Labs: Results:       Chemistry Recent Labs      07/21/17   0620 07/20/17 2200 07/20/17   1700   GLU  196*  127*  206*   NA  139  141  140   K  4.6  4.1  4.3   CL  107  110*  109*   CO2  21  21  22   BUN  21*  20*  18   CREA  2.11*  1.63*  1.41*   CA  8.1*  8.4*  8.4*   AGAP  11  10  9   BUCR  10*  12  13   AP  83  91  86   TP  6.6  7.2  6.6   ALB  2.9*  3.1*  3.2*   GLOB  3.7  4.1*  3.4   AGRAT  0.8  0.8  0.9      CBC w/Diff Recent Labs      07/21/17 0620 07/20/17 2200 07/20/17   1700   WBC  13.9*  16.2*  14.3*   RBC  3.95*  4.23*  4.19*   HGB  10.6*  11.6*  11.3*   HCT  32.6*  34.4*  34.0*   PLT  232  233  244   GRANS  91*  90*  92*   LYMPH  4*  6*  4*   EOS  0  0  0      Coagulation Recent Labs      07/21/17 0620 07/20/17 2200   PTP  47.4*  43.5*   INR  5.6*  5.0*   APTT  79.0*  71.7*       Liver Enzymes Recent Labs      07/21/17 0620   TP  6.6   ALB  2.9*   AP  83   SGOT  173*      ABG Lab Results   Component Value Date/Time    PHI 7.383 07/21/2017 03:56 AM    PCO2I 34.5 (L) 07/21/2017 03:56 AM    PO2I 88 07/21/2017 03:56 AM    HCO3I 20.6 (L) 07/21/2017 03:56 AM    FIO2I 35 07/21/2017 03:56 AM      Microbiology Recent Labs      07/20/17   0900  07/19/17   1530   CULT  NO GROWTH 1 DAY  ANAEROBIC BOTTLE STAPHYLOCOCCUS SPECIES, COAGULASE NEGATIVE*  AEROBIC BOTTLE POSSIBLE 2ND STAPHYLOCOCCUS SPECIES*  NO GROWTH 2 DAYS          Telemetry: []Sinus []A-flutter [x]Paced    []A-fib []Multiple PVCs                    Imaging:  CXR (7/20/17) - ETT in appropriate place; no acute change from prior CXR on 7/19        IMPRESSION:   · S/p cardiac arrest, VT/VF with ROSC in at least 8 mins, currently on hypothermia protocol  · Acquired coagulopathy (prior warfarin use), possibly exacerbated by hypothermia  · Acute on chronic kidney disease, slowly improving  · Elevated LFTs, possibly from hypoperfusion secondary to arrest  · Episodes of Vtach--no recurrence, currently on amiodarone  · Myoclonus, secondary to anoxic/hypoxic injury  · Brief hypotension likely secondary to combination of sedative/ paralytic - resolved  · Hx Ischemic cardiomyopathy, chronic systolic and diastolic HF, latest EF 58%  · Hx LV aneurysm with mural thrombus  · Hx CAD s/p MI  · Hx VTach w/ AICD        PLAN:   · Pt now rewarmed. Monitor for neurologic recovery however prognosis for meaningful recovery is grim  · Resp - VAP bundle; titrate FiO2 to keep SpO2 > 90%. Pulmonary hygiene. · ID - no suspicion for infectious etiology. Trend WBC. Remove CVL   · [facility] - Cardiology service following - defer management of Vtach (on amiodarone) and cardiac issues. Monitor hemodynamics closely  · Heme/onc - stable hgb/hct, plt. · Metabolic - trend electrolytes - replace as needed  · Renal - partida cath; strict I/O  · Endocrine - glycemic checks thru serial BMP.  Glycemic control  · Neuro/ Pain/ Sedation - judicious sedation per ICU sedation protocol  · GI - enteral nutrition   · Prophylaxis - GI            The patient is: [] acutely ill Risk of deterioration: [x] moderate    [x] critically ill  [] high       Negrita Richardson MD

## 2017-07-21 NOTE — PROGRESS NOTES
attended the interdisciplinary rounds for Jacky Kahn, who is a 74 y. o.,male. Patients Primary Language is: [place]. According to the patients EMR Yazdanism Affiliation is: [facility].     The reason the Patient came to the hospital is:   Patient Active Problem List    Diagnosis Date Noted    Cardiac arrest [facility]) 07/19/2017    Ischemic cardiomyopathy     MI (myocardial infarction) ([address]     Apical mural thrombus     CKD (chronic kidney disease) stage 3, GFR 30-59 ml/min     Diabetes ([address]     Systolic heart failure ([address]     AICD (automatic cardioverter/defibrillator) present     Type 2 diabetes mellitus with stage 2 chronic kidney disease ([address] 07/28/2016    SBO (small bowel obstruction) ([address] 01/21/2016    Type 2 diabetes mellitus without complication ([address] 89/51/1884    Chronic kidney disease, stage III (moderate) 08/14/2015    Hyperlipidemia     DM type 2 (diabetes mellitus, type 2) ([address]     Essential hypertension, benign     Ventricular tachycardia (HCC)     Congestive heart failure (HCC)     Other and unspecified angina pectoris     Automatic implantable cardioverter-defibrillator in situ 04/22/2014    Generalized ischemic myocardial dysfunction 06/07/2010    Hypertension 06/07/2010    Diabetes mellitus type 1A ([address] 06/07/2010    Atrial paroxysmal tachycardia ([address] 06/07/2010      Not able to assess the patient due to medical condition. No family at bedside. Plan:  Chaplains will continue to follow and will provide pastoral care on an as needed/requested basis.  recommends bedside caregivers page  on duty if patient shows signs of acute spiritual or emotional distress.     [address]  Board Certified [address] [facility]   [phone]

## 2017-07-22 NOTE — PROGRESS NOTES
[facility] - Progress Note  Admit Date: 7/19/2017    Assessment:     -Cardiac arrest, arrived unresponsive, pulseless VT, s/p shock 10 timed by AICD and external shock, currently intubated and unresponsive on hypothermia protocol. Device interrogation revealed appropriate shocks for VT storm. No further events since arrest.  -Encephalopathy with seizure activity, anoxic brain injury versus possible neurologic event.  -History of VT s/p dual chamber [employer] AICD. Patient is followed by Dr. Federica Noriega, Dr. Jeni Rodriguez, Dr. Arvin Ganser. Previously scheduled for VT ablation, but cancelled due to apical thrombus. Maintained on amiodarone, mexiletine and BB. Device working appropriately on interrogation this admission.  -Elevated troponin, suspect secondary to VT with multiple shocks but known CAD as noted below. Not currently candidate for further coronary work-up.  -H/o apical thrombus, present on echo 12/2016. Maintained on warfarin, INR 2.7 on arrival. No evidence of thrombus on echo this admission.  -Acute renal failure. -Ischemic cardiomyopathy EF 30% on echo this admission unchanged from 12/2016. Not currently decompensated. -CAD s/p history of remote anterior MI, previously followed by Dr. Tong Escobedo. -Paroxysmal atrial fibrillation on remote AICD check.  -Hypertension. Low normal at present.  -Diabetes. On insulin. Hgb A1c 7.8.  -Dyslipidemia. On Crestor.  -Acute on chronic kidney disease.  -Elevated LFTs. -Coagulopathy with INR trending upward despite holding warfarin.      Patient is followed by Dr. Federica Noriega, Dr. Jeni Rodriguez, Dr. Arvin Ganser. Plan:     Continue supportive care. Poor prognosis. Rising Cr noted. Subjective:     Concern for seizures overnight. No VT.     Objective:      Patient Vitals for the past 8 hrs:   Temp Pulse Resp BP SpO2   07/22/17 0746 98.2 °F (36.8 °C) - - - -   07/22/17 0729 - 60 27 - 97 %   07/22/17 0600 97.7 °F (36.5 °C) (!) 58 30 (!) 119/99 99 %   07/22/17 0538 - 60 (!) 31 - 97 %   07/22/17 0500 97.7 °F (36.5 °C) 60 (!) 31 93/63 96 %   07/22/17 0443 - 60 23 - 98 %   07/22/17 0400 97.9 °F (36.6 °C) 61 26 90/61 94 %   07/22/17 0300 97.5 °F (36.4 °C) 75 25 91/59 100 %   07/22/17 0200 97.7 °F (36.5 °C) 60 28 101/52 98 %   07/22/17 0105 - 60 19 - 98 %   07/22/17 0100 97.9 °F (36.6 °C) 60 12 (!) 87/44 99 %         Patient Vitals for the past 96 hrs:   Weight   07/20/17 0400 96.4 kg (212 lb 9.6 oz)   07/19/17 1005 97.1 kg (214 lb)                    Intake/Output Summary (Last 24 hours) at 07/22/17 0813  Last data filed at 07/22/17 0600   Gross per 24 hour   Intake          2958. 59 ml   Output              230 ml   Net          2728. 59 ml       Physical Exam:  General:  Intubated, not responsive  Neck:  nontender  Lungs:  clear to auscultation bilaterally  Heart:  regular rate and rhythm, S1, S2 normal, no murmur, click, rub or gallop  Abdomen:  abdomen is soft without significant tenderness, masses, organomegaly or guarding  Extremities:  extremities normal, atraumatic, no cyanosis or edema    Data Review:     Labs: Results:       Chemistry Recent Labs      07/22/17   0400  07/21/17   0620  07/20/17   2200   GLU  193*  196*  127*   NA  141  139  141   K  4.4  4.6  4.1   CL  110*  107  110*   CO2  21  21  21   BUN  33*  21*  20*   CREA  2.73*  2.11*  1.63*   CA  7.6*  8.1*  8.4*   MG  2.1  2.1  2.3   PHOS  4.3  3.9  3.4   AGAP  10  11  10   BUCR  12  10*  12   AP  72  83  91   TP  6.1*  6.6  7.2   ALB  2.4*  2.9*  3.1*   GLOB  3.7  3.7  4.1*   AGRAT  0.6*  0.8  0.8      CBC w/Diff Recent Labs      07/22/17   0400  07/21/17   0620  07/20/17   2200   WBC  11.6  13.9*  16.2*   RBC  3.53*  3.95*  4.23*   HGB  9.6*  10.6*  11.6*   HCT  29.7*  32.6*  34.4*   PLT  192  232  233   GRANS  80*  91*  90*   LYMPH  2*  4*  6*   EOS  0  0  0      Cardiac Enzymes No results found for: CPK, CKMMB, CKMB, RCK3, CKMBT, CKNDX, CKND1, LAMONT, TROPT, TROIQ, LISA, TROPT, TNIPOC, BNP, BNPP Coagulation Recent Labs      07/22/17   0400  07/21/17   0620  07/20/17   2200   PTP  17.1*  47.4*  43.5*   INR  1.5*  5.6*  5.0*   APTT   --   79.0*  71.7*       Lipid Panel Lab Results   Component Value Date/Time    Cholesterol, total 206 09/21/2016 01:24 PM    HDL Cholesterol 51 09/21/2016 01:24 PM    LDL, calculated 129 09/21/2016 01:24 PM    VLDL, calculated 26 09/21/2016 01:24 PM    Triglyceride 130 09/21/2016 01:24 PM    CHOL/HDL Ratio 4.0 09/21/2016 01:24 PM      BNP No results found for: BNP, BNPP, XBNPT   Liver Enzymes Recent Labs      07/22/17   0400   TP  6.1*   ALB  2.4*   AP  72   SGOT  77*      Digoxin    Thyroid Studies No results found for: T4, T3U, TSH, TSHEXT       Signed By: Faby Chacko MD     July 22, 2017

## 2017-07-22 NOTE — PROGRESS NOTES
NUTRITION    Nutrition Screen      RECOMMENDATIONS / PLAN:     - Continue to advance tube feeding of Glucerna 1.5 as tolerated by 10 mL q 6 hours to goal rate of 60 mL/hr.  - Continue 150 mL q 6 hour water flushes and discontinue IV fluid once feedings at goal (discussed with MD). - Continue RD inpatient monitoring and evaluation. Goal Regimen: Glucerna 1.5 at 60 mL/hr + 150 mL q 6 hour water flushes to provide: 2160 kcal, 119 gm protein, 108 gm fat, 192 gm CHO, 23 gm fiber, 1093 mL free water, 100% RDIs     NUTRITION INTERVENTIONS & DIAGNOSIS:     [x] IV fluid: NS at 50 mL/hr    [x] Enteral nutrition support: advance   [x] Coordination of care: discussed with MD    Nutrition Diagnosis: Inadequate oral intake related to inability to tolerate po as evidenced by NPO. ASSESSMENT:     7/22: Tolerating feeding advancement. Sodium WNL, developed trace edema, minimal UOP.  7/21: Pt anticipated to reach goal rewarming temperature today, will start feedings once protocol completed per MD.   7/20: Pt intubated on hypothermia protocol- to start rewarming at 15:00 today. OGT to continuous suction. Average po intake adequate to meet patients estimated nutritional needs:   [] Yes     [x] No   [] Unable to determine at this time    EN infusion adequate to meet patients estimated nutritional needs:   [] Yes     [x] No   [] Unable to determine at this time    Tube Feeding: Glucerna 1.5 at 30 mL/hr via OGT   Water Flushes: 150 mL q 6 hours  Residuals: 0 mL    Diet: DIET NPO  DIET TUBE FEEDING      Food Allergies: NKFA  Current Appetite:   [] Good     [] Fair     [] Poor     [x] Other: NPO  Appetite/meal intake prior to admission:   [] Good     [] Fair     [] Poor     [x] Other: unknown   Feeding Limitations:  [] Swallowing difficulty    [] Chewing difficulty    [x] Other: intubated   Current Meal Intake: No data found.     BM: PTA   Skin Integrity: WDL  Edema: trace generalized   Pertinent Medications: Reviewed: propofol at 10 mcg/kg/min (153 kcal per day)    Recent Labs      07/22/17   0400  07/21/17   0620  07/20/17   2200   NA  141  139  141   K  4.4  4.6  4.1   CL  110*  107  110*   CO2  21  21  21   GLU  193*  196*  127*   BUN  33*  21*  20*   CREA  2.73*  2.11*  1.63*   CA  7.6*  8.1*  8.4*   MG  2.1  2.1  2.3   PHOS  4.3  3.9  3.4   ALB  2.4*  2.9*  3.1*   SGOT  77*  173*  257*   ALT  207*  316*  386*       Intake/Output Summary (Last 24 hours) at 07/22/17 1211  Last data filed at 07/22/17 0900   Gross per 24 hour   Intake          2541.16 ml   Output              240 ml   Net          2301.16 ml       Anthropometrics:  Ht Readings from Last 1 Encounters:   07/19/17 6' 1\" (1.854 m)     Last 3 Recorded Weights in this Encounter    07/19/17 1005 07/20/17 0400 07/22/17 0817   Weight: 97.1 kg (214 lb) 96.4 kg (212 lb 9.6 oz) 90.9 kg (200 lb 4.8 oz)     Body mass index is 26.43 kg/(m^2). Weight History:   Weight Metrics 7/22/2017 12/29/2016 8/9/2016 7/28/2016 3/31/2016 3/17/2016 2/5/2016   Weight 200 lb 4.8 oz 215 lb 219 lb 215 lb 218 lb 218 lb 228 lb   BMI 26.43 kg/m2 28.37 kg/m2 29.7 kg/m2 29.15 kg/m2 29.56 kg/m2 29.56 kg/m2 30.92 kg/m2        Admitting Diagnosis: Cardiac arrest [facility])  Pertinent PMHx: CKD, DM, HF    Education Needs:        [x] None identified  [] Identified - Not appropriate at this time  []  Identified and addressed - refer to education log  Learning Limitations:   [] None identified  [x] Identified: intubated     Cultural, Yazidism & ethnic food preferences:  [x] None identified    [] Identified and addressed     ESTIMATED NUTRITION NEEDS:     Calories: 9592-4091 kcal ([id number]) based on  [x] Actual BW 96 kg     [] IBW   Protein: 115-192 gm (1.2-2 gm/kg) based on  [x] Actual BW      [] IBW   Fluid: 1 mL/kcal     MONITORING & EVALUATION:     Nutrition Goal(s):  1. Nutritional needs will be met through adequate oral intake or nutrition support within the next 7 days.   Outcome:  [] Met/Ongoing [x]  Not Met/Progressing    [] New/Initial Goal     Monitoring:   [x] EN tolerance   [x] EN infusion   [] Supplement intake   [x] GI symptoms/ability to tolerate po diet   [x] Respiratory status   [x] Plan of care      Previous Recommendations (for follow-up assessments only):     [x]   Implemented       []   Not Implemented (RD to address)     [] No Recommendation Made     Discharge Planning: pending ability to tolerate po and plan of care  [x] Participated in care planning, discharge planning, & interdisciplinary rounds as appropriate      Shaheed Wilson, [address]    Pager: [phone]

## 2017-07-22 NOTE — PROGRESS NOTES
Hospitalist Progress Note    Patient: Geneva Pale Age: 74 y.o. : 1950 MR#: [id number] SSN: [id number]  Date/Time: 2017 3:30 PM    Subjective:     Audra Slaterer, at bedside. Patient remains unresponsive, on sedation and ventilation. He has episodes of fever and blood culture grew staph in 1 set. Off cooling and attempted to wean from sedation this AM, had myoclonus twisting. Patient was initially brought to ED by his brother because his AICD shocked him. He was having chest pain with shocks. He became unresponsive during his drive to the ED, unknown duration. On arrival to the ER, CPR was started. He was intubated   and placed on cooling protocol post cardiac arrest. He has been re-warmed but remains encephalopathic with myoclonus jerks. He troy ventilation dependent. ROS: unable due to unresponsive. Objective:   VS:   Visit Vitals    BP 91/68    Pulse 61    Temp (!) 100.5 °F (38.1 °C)    Resp (!) 39    Ht 6' 1\" (1.854 m)    Wt 90.9 kg (200 lb 4.8 oz)    SpO2 100%    BMI 26.43 kg/m2      Tmax/24hrs: Temp (24hrs), Av °F (36.7 °C), Min:96.8 °F (36 °C), Max:100.5 °F (38.1 °C)    Intake/Output Summary (Last 24 hours) at 17 1530  Last data filed at 17 1403   Gross per 24 hour   Intake          3435.14 ml   Output              210 ml   Net          3225.14 ml       General: Sedated and unresponsive. Still maintain on ventilation. Family at bedside   HEENT: Pin point pupils, NC/AT, intubated, no JVD  Cardiovascular:  S1S2 regular, no rub/gallop  Pulmonary:  Air entry bilaterally  GI:  Soft, non tender, non distended   Extremities:  No pedal edema, distal pulses appreciated   Neuro: sedated, unable to completed.     Additional:   Current Facility-Administered Medications   Medication Dose Route Frequency    ELECTROLYTE REPLACEMENT PROTOCOL  1 Each Other PRN    ELECTROLYTE REPLACEMENT PROTOCOL  1 Each Other PRN    ELECTROLYTE REPLACEMENT PROTOCOL  1 Each Other PRN    acetaminophen (TYLENOL) solution 1,000 mg  1,000 mg Per NG tube Q6H PRN    LORazepam (ATIVAN) injection 2-4 mg  2-4 mg IntraVENous Q2H PRN    insulin lispro (HUMALOG) injection   SubCUTAneous Q6H    midazolam in normal saline (VERSED) 1 mg/mL infusion  0-10 mg/hr IntraVENous TITRATE    levETIRAcetam (KEPPRA) 500 mg in 100 ml IVPB  500 mg IntraVENous Q12H    propofol (DIPRIVAN) infusion  5-50 mcg/kg/min IntraVENous TITRATE    fentaNYL citrate (PF) injection  mcg   mcg IntraVENous Q4H PRN    amiodarone (NEXTERONE) 360 mg in dextrose 200 mL (1.8 mg/mL) infusion  0.5 mg/min IntraVENous TITRATE    0.9% sodium chloride infusion  50 mL/hr IntraVENous CONTINUOUS    hydroxypropyl methylcellulose (GONAK) 2.5 % ophthalmic solution 1 Drop  1 Drop Both Eyes Q12H    glucose chewable tablet 16 g  4 Tab Oral PRN    glucagon (GLUCAGEN) injection 1 mg  1 mg IntraMUSCular PRN    dextrose (D50W) injection syrg 12.5-25 g  25-50 mL IntraVENous PRN    pantoprazole (PROTONIX) 40 mg in sodium chloride 0.9 % 10 mL injection  40 mg IntraVENous DAILY          Labs:    Recent Results (from the past 24 hour(s))   GLUCOSE, POC    Collection Time: 07/21/17  5:40 PM   Result Value Ref Range    Glucose (POC) 137 (H) 70 - 110 mg/dL   GLUCOSE, POC    Collection Time: 07/22/17 12:47 AM   Result Value Ref Range    Glucose (POC) 203 (H) 70 - 110 mg/dL   CBC WITH AUTOMATED DIFF    Collection Time: 07/22/17  4:00 AM   Result Value Ref Range    WBC 11.6 4.6 - 13.2 K/uL    RBC 3.53 (L) 4.70 - 5.50 M/uL    HGB 9.6 (L) 13.0 - 16.0 g/dL    HCT 29.7 (L) 36.0 - 48.0 %    MCV 84.1 74.0 - 97.0 FL    MCH 27.2 24.0 - 34.0 PG    MCHC 32.3 31.0 - 37.0 g/dL    RDW 14.9 (H) 11.6 - 14.5 %    PLATELET 868 826 - 033 K/uL    MPV 9.8 9.2 - 11.8 FL    NEUTROPHILS 80 (H) 42 - 75 %    BAND NEUTROPHILS 14 (H) 0 - 5 %    LYMPHOCYTES 2 (L) 20 - 51 %    MONOCYTES 3 2 - 9 %    EOSINOPHILS 0 0 - 5 %    BASOPHILS 1 0 - 3 %    ABS.  NEUTROPHILS 11.0 (H) 1.8 - 8.0 K/UL    ABS. LYMPHOCYTES 0.2 (L) 0.8 - 3.5 K/UL    ABS. MONOCYTES 0.3 0 - 1.0 K/UL    ABS. EOSINOPHILS 0.0 0.0 - 0.4 K/UL    ABS. BASOPHILS 0.1 (H) 0.0 - 0.06 K/UL    DF AUTOMATED      PLATELET COMMENTS ADEQUATE PLATELETS      RBC COMMENTS ANISOCYTOSIS  1+       METABOLIC PANEL, COMPREHENSIVE    Collection Time: 07/22/17  4:00 AM   Result Value Ref Range    Sodium 141 136 - 145 mmol/L    Potassium 4.4 3.5 - 5.5 mmol/L    Chloride 110 (H) 100 - 108 mmol/L    CO2 21 21 - 32 mmol/L    Anion gap 10 3.0 - 18 mmol/L    Glucose 193 (H) 74 - 99 mg/dL    BUN 33 (H) 7.0 - 18 MG/DL    Creatinine 2.73 (H) 0.6 - 1.3 MG/DL    BUN/Creatinine ratio 12 12 - 20      GFR est AA 29 (L) >60 ml/min/1.73m2    GFR est non-AA 24 (L) >60 ml/min/1.73m2    Calcium 7.6 (L) 8.5 - 10.1 MG/DL    Bilirubin, total 0.3 0.2 - 1.0 MG/DL    ALT (SGPT) 207 (H) 16 - 61 U/L    AST (SGOT) 77 (H) 15 - 37 U/L    Alk. phosphatase 72 45 - 117 U/L    Protein, total 6.1 (L) 6.4 - 8.2 g/dL    Albumin 2.4 (L) 3.4 - 5.0 g/dL    Globulin 3.7 2.0 - 4.0 g/dL    A-G Ratio 0.6 (L) 0.8 - 1.7     PHOSPHORUS    Collection Time: 07/22/17  4:00 AM   Result Value Ref Range    Phosphorus 4.3 2.5 - 4.9 MG/DL   PROTHROMBIN TIME + INR    Collection Time: 07/22/17  4:00 AM   Result Value Ref Range    Prothrombin time 17.1 (H) 11.5 - 15.2 sec    INR 1.5 (H) 0.8 - 1.2     MAGNESIUM    Collection Time: 07/22/17  4:00 AM   Result Value Ref Range    Magnesium 2.1 1.6 - 2.6 mg/dL   POC G3    Collection Time: 07/22/17  4:54 AM   Result Value Ref Range    Device: VENT      FIO2 (POC) 35 %    pH (POC) 7.421 7.35 - 7.45      pCO2 (POC) 30.9 (L) 35.0 - 45.0 MMHG    pO2 (POC) 57 (L) 80 - 100 MMHG    HCO3 (POC) 20.1 (L) 22 - 26 MMOL/L    sO2 (POC) 90 (L) 92 - 97 %    Base deficit (POC) 4 mmol/L    Mode ASSIST CONTROL      Set Rate 14 bpm    PEEP/CPAP (POC) 5 cmH2O    PIP (POC) 13      Allens test (POC) YES      Total resp. rate 27      Site DRAWN FROM ARTERIAL LINE      Patient temp. 37.0      Specimen type (POC) ARTERIAL      Performed by Joan Nicole     Pressure control YES     GLUCOSE, POC    Collection Time: 07/22/17  6:12 AM   Result Value Ref Range    Glucose (POC) 150 (H) 70 - 110 mg/dL   EKG, 12 LEAD, SUBSEQUENT    Collection Time: 07/22/17  7:00 AM   Result Value Ref Range    Ventricular Rate 60 BPM    Atrial Rate 59 BPM    QRS Duration 92 ms    Q-T Interval 422 ms    QTC Calculation (Bezet) 422 ms    Calculated R Axis 116 degrees    Calculated T Axis -73 degrees    Diagnosis       Electronic atrial pacemaker  Low voltage QRS  Anterolateral infarct (cited on or before 21-JUL-2017)  Abnormal ECG  When compared with ECG of 21-JUL-2017 07:15,  QT has shortened  Confirmed by Cortez Calvert MD, Tiffanie Yang [id number]) on 7/22/2017 9:38:53 AM     GLUCOSE, POC    Collection Time: 07/22/17 10:44 AM   Result Value Ref Range    Glucose (POC) 184 (H) 70 - 110 mg/dL   POC G3    Collection Time: 07/22/17 11:15 AM   Result Value Ref Range    Device: VENT      FIO2 (POC) 45 %    pH (POC) 7.409 7.35 - 7.45      pCO2 (POC) 26.7 (L) 35.0 - 45.0 MMHG    pO2 (POC) 48 (LL) 80 - 100 MMHG    HCO3 (POC) 16.9 (L) 22 - 26 MMOL/L    sO2 (POC) 85 (L) 92 - 97 %    Base deficit (POC) 8 mmol/L    Mode ASSIST CONTROL      Set Rate 14 bpm    PEEP/CPAP (POC) 8 cmH2O    PIP (POC) 22      Allens test (POC) N/A      Inspiratory Time 1 sec    Total resp.  rate 30      Site DRAWN FROM ARTERIAL LINE      Patient temp. 98.2      Specimen type (POC) ARTERIAL      Performed by Yara Gray     Pressure control YES     POC G3    Collection Time: 07/22/17 12:55 PM   Result Value Ref Range    Device: VENT      FIO2 (POC) 80 %    pH (POC) 7.386 7.35 - 7.45      pCO2 (POC) 30.3 (L) 35.0 - 45.0 MMHG    pO2 (POC) 68 (L) 80 - 100 MMHG    HCO3 (POC) 18.2 (L) 22 - 26 MMOL/L    sO2 (POC) 93 92 - 97 %    Base deficit (POC) 7 mmol/L    Mode ASSIST CONTROL      Set Rate 14 bpm    PEEP/CPAP (POC) 10 cmH2O    PIP (POC) 24      Allens test (POC) N/A Inspiratory Time 0.85 sec    Total resp. rate 34      Site DRAWN FROM ARTERIAL LINE      Patient temp. 98.2      Specimen type (POC) ARTERIAL      Performed by Hubert Reardon control YES       EEG:  ELECTROENCEPHALOGRAM INTERPRETATION: This is a  markedly abnormal recording due to the presence of marked  suppression of background activity. The patient had been on sedation,  however, so does not fit the requirement for electrocortical silence. Unvarying suppression of background activity without reactivity or poor  prognostic indicators for meaningful recovery. No epileptiform  discharges were noted. Chest Xray:  Impression:       1. Support tubes/devices, as above. 2. Stable enlargement of the cardiac silhouette. Probable mild pulmonary  vascular congestion. 3. Increased bibasilar hazy opacities, left greater than right, possibly  atelectasis versus infiltrate/edema. Small left effusion is not excluded. 2D echocardiogram:  SUMMARY:  Procedure information: This was a technically difficult study. Intravenous  contrast (Definity) was administered. Left ventricle: Systolic function was moderately to markedly reduced by  visual assessment. Ejection fraction was estimated to be 30 %. There was  severe hypokinesis of the mid-apical anterior, basal-mid inferoseptal,  entire inferior, apical septal, apical lateral, and apical wall(s). Wall  thickness was mildly to moderately increased. Right atrium: The atrium was mildly dilated. Mitral valve: There was mild regurgitation. Aorta, systemic arteries: The root exhibited mild dilatation.     All Micro Results     Procedure Component Value Units Date/Time    CULTURE, BLOOD [[id number]] Collected:  07/22/17 1555    Order Status:  Completed Specimen:  Blood from Blood Updated:  07/22/17 1628    CULTURE, URINE [[id number]]     Order Status:  Sent Specimen:  Urine from [place] Specimen     CULTURE, RESPIRATORY/SPUTUM/BRONCH [employer] STAIN [[id number]]     Order Status:  Sent Specimen:  Sputum from Sputum,ET Suction     CULTURE, URINE [[id number]] Collected:  07/20/17 0900    Order Status:  Completed Specimen:  [place] Specimen Updated:  07/22/17 0838     Special Requests: NO SPECIAL REQUESTS        Culture result: NO GROWTH 2 DAYS       CULTURE, BLOOD [[id number]]  (Abnormal)  (Susceptibility) Collected:  07/19/17 1530    Order Status:  Completed Specimen:  Blood from Blood Updated:  07/22/17 0708     Special Requests: NONE        GRAM STAIN         AEROBIC AND ANAEROBIC BOTTLES GRAM POSITIVE COCCI              SMEAR CALLED TO AND CORRECTLY REPEATED BY: TOYA RN CCU 1130 7/20 TO [facility]     Culture result:         ANAEROBIC BOTTLE STAPHYLOCOCCUS EPIDERMIDIS (A)              AEROBIC BOTTLE STAPHYLOCOCCUS LUGDUNENSIS (A)    CULTURE, BLOOD [[id number]] Collected:  07/19/17 1530    Order Status:  Completed Specimen:  Blood from Blood Updated:  07/22/17 0701     Special Requests: NO SPECIAL REQUESTS        Culture result: NO GROWTH 3 DAYS             Assessment/Plan:     1. Cardio:    A. Cardiac arrest: Pulseless VT, post AICD shock, off from hypothermia protocol       - device interrogated, normal       - on Amiodarone drip       - cardiology follows       B.  CAD with recent troponin elevation, secondary to VT and shocks    C. History of VT with AICD, to schedule for VT ablation but cancelled due to apical thrombus    D. Ischemic cardiomyopathy, EF 30%    E.  Paroxysmal atrial fibrillation, in sinus rhythm     2. Resp:     A. Ventilation dependent hypoxic respiratory failure, remains intubated and unable to wean off ventilation today        - Vent cont, management per ICU protocol    3. Neuro:     A. Encephalopathy, likely Anoxic brain injury,       - need to wean off sedation if tolerated. Poor prognosis      - will need to update family and have goal of care      B. Persistent myoclonus, ?seizure, on keppra. , improved with increase sedation      4. :     A.   NUSRAT on CKD III, suspected from hypoperfusion      - will need to have renal consult if family wants aggressive care    5. GI:     A. Liver shock wit transaminitis, improving. B.  On GI feed    6. ID:     A. Positive blood culture, with Staph, ?contamination      - repeat cultures and start clindamycin     7. Endo:     A.  DMT2. On ISS per ICU protocol    8. Heme/Onc:      A. Hypercoagulable state, improved.   Off coumadin         Disposition: pending clinical status     Additional Notes:    Time spent 40 minutes    Case discussed with:  []Patient  [x]Family  [x]Nursing  []Case Management  DVT Prophylaxis:  []Lovenox  []Hep SQ  [x]SCDs  []Coumadin   []On Heparin gtt    Signed By: Ramo Causey MD     July 22, 2017 3:30 PM

## 2017-07-22 NOTE — ROUTINE PROCESS
Bedside and Verbal shift change report given to Carmen Borjas RN (oncoming nurse) by  SIMI Corrigan offgoing nurse). Report included the following information SBAR, Kardex, Intake/Output, MAR and Recent Results.

## 2017-07-22 NOTE — PROGRESS NOTES
Pt unresponsive with pinpoint pupils and no response to pain. Weak cough and gag present with ETT suction. Propofol and Versed gtts titrated for labile BP. Cooling machine turned off at this time due to body temp WNL. Pads left in place. Continue to monitor.

## 2017-07-22 NOTE — PROGRESS NOTES
[facility]  ICU Progress Note      Name: Becky Starks   : 1950   MRN: [id number]   Date: 2017 10:50 AM     [x]I have reviewed the flowsheet and previous days notes. Events overnight reviewed and discussed with nursing staff. Vital signs and records reviewed. Subjective:  17  * Events overnight. Return of myoclonus overnight when propofol discontinue- had to restart. Myoclonus noted this morning with minimal patient repositioning. Propofol added at low dose this morning with subsequent decrease in Versed. * Now marginally hypotensive. * Becoming profoundly hypoxic and tachypneic. * No recurrent VF or VT  * Poor prognosis          [x]The patient is unable to give any meaningful history or review of systems because the patient is:  [x]Intubated [x]Sedated   [x]Unresponsive      [x]The patient is critically ill on      [x]Mechanical ventilation []Pressors   []BiPAP []                 ROS:unobtainable. Sedated and intubated.     Medication Review:  · Pressors - No  · Sedation - Propofol @ 10mcg, (versed and ativan on call)  · Antibiotics - None  · Pain - Fentanyl on call  · GI/ DVT - Protonix  · Others - Keppra 500mg q 12hrs, Amiodarone 0.5 mg/min    Safety Bundles: VAP Bundle/ CAUTI/ Electrolyte Replacement Protocol    Vital Signs:    Visit Vitals    BP 91/68    Pulse 60    Temp 98.2 °F (36.8 °C)    Resp 16    Ht 6' 1\" (1.854 m)    Wt 90.9 kg (200 lb 4.8 oz)    SpO2 94%    BMI 26.43 kg/m2       O2 Device: Ventilator       Temp (24hrs), Av.8 °F (36.6 °C), Min:96.8 °F (36 °C), Max:99.3 °F (37.4 °C)       Intake/Output:   Last shift:      701 - 1900  In: 60   Out: 30 [Urine:30]  Last 3 shifts: 1901 -  0700  In: 4483.6 [I.V.:3683.6]  Out: 395 [Urine:395]    Intake/Output Summary (Last 24 hours) at 17 1050  Last data filed at 17 0900   Gross per 24 hour   Intake          3018.59 ml   Output              255 ml   Net 2763.59 ml       Ventilator Settings:  Ventilator  Mode: Assist control, Pressure control  Respiratory Rate  Back-Up Rate: 14  Insp Time (sec): 1 sec  I:E Ratio: 1:2.9  Ventilator Volumes  Vt Set (ml): 450 ml  Vt Exhaled (Machine Breath) (ml): 562 ml  Vt Spont (ml): 476 ml  Ve Observed (l/min): 19.5 l/min  Ventilator Pressures  PC Set: 13  PIP Observed (cm H2O): 22 cm H2O  Plateau Pressure (cm H2O): 16 cm H2O  MAP (cm H2O): 16  PEEP/VENT (cm H2O): 8 cm H20    Physical Exam:    General: Intubated/sedated; HEENT:  Anicteric sclerae; pink palpebral conjunctivae; mucosa moist, pinpoint pupils  Resp:  Symmetrical chest expansion, no accessory muscle use; good airway entry; no rales/ wheezing/ rhonchi noted. Tachypneic with increasing hypoxia. CV:   regular rate and rhythm  GI:  Abdomen soft, (-) active bowel sounds  Extremities:  +2 pulses on all extremities; trace pedal edema  Skin:  Warm; no rashes/ lesions noted  Neurologic:  Does not follow commands. Myoclonus with minimal movement.    Devices:  OGT, ETT      DATA:     Current Facility-Administered Medications   Medication Dose Route Frequency    LORazepam (ATIVAN) injection 2-4 mg  2-4 mg IntraVENous Q2H PRN    insulin lispro (HUMALOG) injection   SubCUTAneous Q6H    midazolam in normal saline (VERSED) 1 mg/mL infusion  0-10 mg/hr IntraVENous TITRATE    levETIRAcetam (KEPPRA) 500 mg in 100 ml IVPB  500 mg IntraVENous Q12H    propofol (DIPRIVAN) infusion  5-50 mcg/kg/min IntraVENous TITRATE    fentaNYL citrate (PF) injection  mcg   mcg IntraVENous Q4H PRN    amiodarone (NEXTERONE) 360 mg in dextrose 200 mL (1.8 mg/mL) infusion  0.5 mg/min IntraVENous TITRATE    0.9% sodium chloride infusion  50 mL/hr IntraVENous CONTINUOUS    hydroxypropyl methylcellulose (GONAK) 2.5 % ophthalmic solution 1 Drop  1 Drop Both Eyes Q12H    glucose chewable tablet 16 g  4 Tab Oral PRN    glucagon (GLUCAGEN) injection 1 mg  1 mg IntraMUSCular PRN    dextrose (D50W) injection syrg 12.5-25 g  25-50 mL IntraVENous PRN    pantoprazole (PROTONIX) 40 mg in sodium chloride 0.9 % 10 mL injection  40 mg IntraVENous DAILY         Labs: Results:       Chemistry Recent Labs      07/22/17   0400  07/21/17   0620 07/20/17 2200   GLU  193*  196*  127*   NA  141  139  141   K  4.4  4.6  4.1   CL  110*  107  110*   CO2  21  21  21   BUN  33*  21*  20*   CREA  2.73*  2.11*  1.63*   CA  7.6*  8.1*  8.4*   AGAP  10  11  10   BUCR  12  10*  12   AP  72  83  91   TP  6.1*  6.6  7.2   ALB  2.4*  2.9*  3.1*   GLOB  3.7  3.7  4.1*   AGRAT  0.6*  0.8  0.8      CBC w/Diff Recent Labs      07/22/17   0400  07/21/17 0620 07/20/17 2200   WBC  11.6  13.9*  16.2*   RBC  3.53*  3.95*  4.23*   HGB  9.6*  10.6*  11.6*   HCT  29.7*  32.6*  34.4*   PLT  192  232  233   GRANS  80*  91*  90*   LYMPH  2*  4*  6*   EOS  0  0  0      Coagulation Recent Labs      07/22/17   0400  07/21/17   0620 07/20/17   2200   PTP  17.1*  47.4*  43.5*   INR  1.5*  5.6*  5.0*   APTT   --   79.0*  71.7*       Liver Enzymes Recent Labs      07/22/17   0400   TP  6.1*   ALB  2.4*   AP  72   SGOT  77*      ABG Lab Results   Component Value Date/Time    PHI 7.421 07/22/2017 04:54 AM    PCO2I 30.9 (L) 07/22/2017 04:54 AM    PO2I 57 (L) 07/22/2017 04:54 AM    HCO3I 20.1 (L) 07/22/2017 04:54 AM    FIO2I 35 07/22/2017 04:54 AM      Microbiology Recent Labs      07/20/17   0900  07/19/17   1530   CULT  NO GROWTH 2 DAYS  ANAEROBIC BOTTLE STAPHYLOCOCCUS EPIDERMIDIS*  AEROBIC BOTTLE STAPHYLOCOCCUS LUGDUNENSIS*  NO GROWTH 3 DAYS        Imaging:  [x]I have personally reviewed the patients radiographs  []Radiographs reviewed with radiologist   []No change from prior, tubes and lines in adequate position  []Improved   []Worsening     CXR 7/22/17  Impression:       1. Support tubes/devices, as above. 2. Stable enlargement of the cardiac silhouette. Probable mild pulmonary  vascular congestion.   3. Increased bibasilar hazy opacities, left greater than right, possibly  atelectasis versus infiltrate/edema. Small left effusion is not excluded.               7/19/17: Lower Extremity US  INTERPRETATION/FINDINGS  Duplex images were obtained using 2-D gray scale, color flow, and  spectral Doppler analysis.     Right leg :  1. No evidence of deep venous thrombosis detected in the veins  visualized. 2. Deep vein(s) visualized include the common femoral, femoral,  popliteal, posterior tibial, and peroneal veins. 3. No evidence of superficial thrombosis detected. 4. Superficial vein(s) visualized include the great saphenous vein at  the sapheno-femoral junction. Left leg :  1. No evidence of deep venous thrombosis detected in the veins  visualized. 2. Deep vein(s) visualized include the common femoral, femoral,  popliteal, posterior tibial, and peroneal veins. 3. No evidence of superficial thrombosis detected. 4. Superficial vein(s) visualized include the great saphenous vein at  the sapheno-femoral junction.     ADDITIONAL COMMENTS  Technically difficult exam due to patient's inability to position legs   properly. Patient also convulsing throughout exam.     IMPRESSION:   1. S/P out of house VF/VT arrest:   8 min downtime s/p hypothermia protocol. Now hemodynamically stable on Amiodarone 0.5 mg/min. 2.  Myoclonus secondary to anoxic brain injury:  Myoclonus intensifies with movement. requiring sedation due to increased work of breathing. Patient placed on Propofol to reduce the amount of Versed use. Now marginally hypotensive. Neuro following. Patient is currently on [address]. No meaningful improvement in neurologic status. Poor prognosis. 3.  Resp alkalosis with profound hypoxia:   PIP 24 with P-plat 16. No secretions upon suction. - improved      PLAN:   · Resp -  Increased FiO2 and PEEP. Gave 2 mg Ativan to suppress myoclonus and improve respiratory mechanics.      · ID -   Afebrile, normal WBCs with a bandemia of 14  · CVS - Marginally hypotensive. May need to stop Propofol completely and bolus periodically with Ativan. Continue Amio per cardiology recommendations. · Heme/onc -   H&H within acceptable limits. Continue to monitor. · Metabolic - Replace lytes per renal replacement protocol. · Renal -  Continue strict I&Os  · Endocrine - Follow glucose. · Neuro/ Pain/ Sedation - PO2 improved with added sedation. May require more aggressive sedation to gain adequate oxygenation. · GI - Continue TFs  · Prophylaxis - SCDs, Propofol  · Discussed with nursing, RT and RD          The patient is: [] acutely ill Risk of deterioration: [] moderate    [x] critically ill  [x] high     [x]See my orders for details    My assessment/plan was discussed with:  [x]nursing []PT/OT    [x]respiratory therapy [x]Dr. Derek Peterson []     []Total critical care time exclusive of procedures   70    minutes    Shelley A Day, PA     ICU Staff:  Patient seen and evaluated in ICU. Agree with the above. Continue with current sedation of propofol and versed due to respiratory decline with myoclonic activity- possibly worsening infiltrates on CXR, bandemia without elevated WBC noted. Blood culture + for 2 staph species. Recheck blood, urine, sputum and start Levaquin ( both species sensitive)- unclear if contaminant or infection at this point. TLC discontinued yesterday. Doppler US from 7/19/17 negative for DVT. Asher Gunderson,   Pulmonary, Sleep and [address]

## 2017-07-22 NOTE — PROCEDURES
[facility]    Name:  Viviana Yi  MR#:  [id number]  :  1950  Account #:  [de-identified]  Date of Adm:  2017  Date of Service:  2017      This patient's EEG examination was performed due to coma following  cardiac arrest.    Medications include  1. Diprivan. 2. Insulin. 3. Reglan. 4.     EEG examination was performed as an inpatient utilizing both  referential and differential montages. The patient was noted to be  intubated. *fentanyl* was held 30 minutes prior to study. Painful stimulation did  not elicit activity. Photic stimulation did not elicit activity. Occasional  ectopy was noted on EKG. He was noted to be intubated. The patient  had marked depression of background activity without response,  unchanging at higher sensitivities. There was pulse artifact and other  artifact, but no evidence of electrocortical activity. ELECTROENCEPHALOGRAM INTERPRETATION: This is a  markedly abnormal recording due to the presence of marked  suppression of background activity. The patient had been on sedation,  however, so does not fit the requirement for electrocortical silence. Unvarying suppression of background activity without reactivity or poor  prognostic indicators for meaningful recovery. No epileptiform  discharges were noted.             MD NATALIA Lindquist / NOMAN  D:  2017   11:31  T:  2017   03:11  Job #:  [id number]

## 2017-07-23 NOTE — ROUTINE PROCESS
Bedside and Verbal shift change report given to Vance Smith RN (oncoming nurse) by SIMI Church RN (offgoing nurse). Report included the following information SBAR, Kardex, Intake/Output, MAR and Recent Results.

## 2017-07-23 NOTE — PROGRESS NOTES
[facility]  ICU Progress Note      Name: Tai Milligan   : 1950   MRN: [id number]   Date: 2017 1:57 PM     [x]I have reviewed the flowsheet and previous days notes. Events overnight reviewed and discussed with nursing staff. Vital signs and records reviewed. Subjective:  17  * Events overnight. Myoclonus better controlled with proper sedation, however, neuro cannot conduct a proper evaluation with sedation. * Remains marginally hypotensive, though still not requiring pressors. * Patient remains tachypneic. Hypoxia resolved.   PIP and P-plat both are 25 (ARDS?)       * No recurrent VF or VT  * Poor prognosis    [x]The patient is unable to give any meaningful history or review of systems because the patient is:  [x]Intubated [x]Sedated   [x]Unresponsive      [x]The patient is critically ill on      [x]Mechanical ventilation []Pressors   []BiPAP []           Medication Review:  · Pressors - None  · Sedation - Versed 2, Propofol 20  · Antibiotics - Clinda, Rocephin   · Pain - None  · GI/ DVT - Protonix, SCDs (INR elevated)  · Others (other gtts)    Safety Bundles: VAP Bundle/ CAUTI/ Severe Sepsis Protocol/ Electrolyte Replacement Protocol    Vital Signs:    Visit Vitals    /51    Pulse 60    Temp 99 °F (37.2 °C)    Resp (!) 37    Ht 6' 1\" (1.854 m)    Wt 107 kg (235 lb 12.8 oz)    SpO2 100%    BMI 31.11 kg/m2       O2 Device: Endotracheal tube, Ventilator       Temp (24hrs), Av.1 °F (37.8 °C), Min:99 °F (37.2 °C), Max:101.2 °F (38.4 °C)       Intake/Output:   Last shift:         Last 3 shifts:  1901 -  0700  In: 5492.9 [I.V.:2872.9]  Out: 315 [Urine:315]    Intake/Output Summary (Last 24 hours) at 17 1357  Last data filed at 17 0600   Gross per 24 hour   Intake          3245.07 ml   Output              120 ml   Net          3125.07 ml       Ventilator Settings:  Ventilator  Mode: Assist control, Pressure control  Respiratory Rate  Back-Up Rate: 14  Insp Time (sec): 0.8 sec  I:E Ratio: 1:2.9  Ventilator Volumes  Vt Set (ml): 450 ml  Vt Exhaled (Machine Breath) (ml): 594 ml  Vt Spont (ml): 476 ml  Ve Observed (l/min): 19.5 l/min  Ventilator Pressures  PC Set: 13  PIP Observed (cm H2O): 25 cm H2O  Plateau Pressure (cm H2O): 24 cm H2O  MAP (cm H2O): 17  PEEP/VENT (cm H2O): 10 cm H20    Physical Exam:    General: Intubated/sedated; HEENT:  Anicteric sclerae; pink palpebral conjunctivae; mucosa moist  Resp:  Symmetrical chest expansion, no accessory muscle use; good airway entry; no rales/ wheezing/ rhonchi noted  CV:  regular rate and rhythm. Sinus arrhythmia on the monitor  GI:  Abdomen soft, hypo-active bowel sounds  Extremities:  +2 pulses on all extremities; trace pedal edema. Neg cyanosis/ clubbing noted  Skin:  Warm; no rashes/ lesions noted  Neurologic:  Unresponsive. Does not follow commands. Devices:  OGT,  PICC, ETT all appear to be well positioned.       DATA:     Current Facility-Administered Medications   Medication Dose Route Frequency    cefTRIAXone (ROCEPHIN) 1 g in 0.9% sodium chloride (MBP/ADV) 50 mL MBP  1 g IntraVENous Q24H    ELECTROLYTE REPLACEMENT PROTOCOL  1 Each Other PRN    ELECTROLYTE REPLACEMENT PROTOCOL  1 Each Other PRN    ELECTROLYTE REPLACEMENT PROTOCOL  1 Each Other PRN    acetaminophen (TYLENOL) solution 1,000 mg  1,000 mg Per NG tube Q6H PRN    clindamycin phosphate (CLEOCIN) 600 mg in 0.9% sodium chloride (MBP/ADV) 100 mL ADV  600 mg IntraVENous Q8H    LORazepam (ATIVAN) injection 2-4 mg  2-4 mg IntraVENous Q2H PRN    insulin lispro (HUMALOG) injection   SubCUTAneous Q6H    midazolam in normal saline (VERSED) 1 mg/mL infusion  0-10 mg/hr IntraVENous TITRATE    levETIRAcetam (KEPPRA) 500 mg in 100 ml IVPB  500 mg IntraVENous Q12H    propofol (DIPRIVAN) infusion  5-50 mcg/kg/min IntraVENous TITRATE    fentaNYL citrate (PF) injection  mcg   mcg IntraVENous Q4H PRN    amiodarone (NEXTERONE) 360 mg in dextrose 200 mL (1.8 mg/mL) infusion  0.5 mg/min IntraVENous TITRATE    0.9% sodium chloride infusion  50 mL/hr IntraVENous CONTINUOUS    hydroxypropyl methylcellulose (GONAK) 2.5 % ophthalmic solution 1 Drop  1 Drop Both Eyes Q12H    glucose chewable tablet 16 g  4 Tab Oral PRN    glucagon (GLUCAGEN) injection 1 mg  1 mg IntraMUSCular PRN    dextrose (D50W) injection syrg 12.5-25 g  25-50 mL IntraVENous PRN    pantoprazole (PROTONIX) 40 mg in sodium chloride 0.9 % 10 mL injection  40 mg IntraVENous DAILY         Labs: Results:       Chemistry Recent Labs      07/23/17 0439 07/22/17   0400  07/21/17   0620   GLU  213*  193*  196*   NA  139  141  139   K  4.8  4.4  4.6   CL  108  110*  107   CO2  20*  21  21   BUN  53*  33*  21*   CREA  4.26*  2.73*  2.11*   CA  7.8*  7.6*  8.1*   AGAP  11  10  11   BUCR  12  12  10*   AP  63  72  83   TP  6.1*  6.1*  6.6   ALB  2.1*  2.4*  2.9*   GLOB  4.0  3.7  3.7   AGRAT  0.5*  0.6*  0.8      CBC w/Diff Recent Labs      07/23/17 0439 07/22/17   0400 07/21/17   0620   WBC  8.5  11.6  13.9*   RBC  3.17*  3.53*  3.95*   HGB  8.7*  9.6*  10.6*   HCT  26.1*  29.7*  32.6*   PLT  173  192  232   GRANS  33*  80*  91*   LYMPH  9*  2*  4*   EOS  0  0  0      Coagulation Recent Labs      07/23/17 0439 07/22/17   0400  07/21/17   0620  07/20/17   2200   PTP  17.8*  17.1*  47.4*  43.5*   INR  1.5*  1.5*  5.6*  5.0*   APTT   --    --   79.0*  71.7*       Liver Enzymes Recent Labs      07/23/17 0439   TP  6.1*   ALB  2.1*   AP  63   SGOT  53*      ABG Lab Results   Component Value Date/Time    PHI 7.389 07/23/2017 05:37 AM    PCO2I 32.4 (L) 07/23/2017 05:37 AM    PO2I 96 07/23/2017 05:37 AM    HCO3I 19.5 (L) 07/23/2017 05:37 AM    FIO2I 0.80 07/23/2017 05:37 AM      Microbiology Recent Labs      07/22/17   1735  07/22/17   1615  07/22/17   1600   CULT  NO GROWTH AFTER 12 HOURS  MANY STAPHYLOCOCCUS AUREUS*  MODERATE GRAM NEGATIVE RODS*  MODERATE POSSIBLE 2ND GRAM NEGATIVE ETIENNE*  NO GROWTH AFTER 11 HOURS          Telemetry: [x]Sinus []A-flutter []Paced    []A-fib []Multiple PVCs                    Imaging:  [x]I have personally reviewed the patients radiographs  []Radiographs reviewed with radiologist   []No change from prior, tubes and lines in adequate position  []Improved   []Worsening        IMPRESSION:   1. S/P out of house VF/VT arrest:   8 min downtime s/p hypothermia protocol. Remains hemodynamically stable on Amiodarone 0.5 mg/min.     2.  Myoclonus secondary to anoxic brain injury:  No meaningful improvement in neurologic status. Will not be able to make a proper neurological assessment with sedation. Poor prognosis.      3. Resp alkalosis resolved. Hypoxia resolved:   PIP 25 with P-plat 25. Bilateral patchy infiltrates. Possible ARDS. 4.  No recurrence of V-tach. Per cardiology, not a candidate for invasive eval.      5. ATN secondary to hypotension, sepsis. No urgent need for HD per nephrology. 6.  Family meeting today to consider disposition of patient.             PLAN:   · Resp -  Dial down FiO2 to keep PO2 > 80, otherwise continue current vent settings. · ID - Fever yesterday T-max 101.2. NML WBCs with 49 bandemia. Pan cultures pending. Sputum grew GPCs in groups and pairs. Blood and urine pending. Continue Clinda (started 7/22) and Rocephin (started 7/23). · CVS - Hemodynamically stable. Amio at 0.5 per cards. Not an invasive eval candidate. · Heme/onc -   H&H acceptable. INR still slightly elevated. · Renal - Replace lytes as required. · Endocrine - SSI for glucose management  · Neuro/ Pain/ Sedation - Versed 2, Propofol 20. Will need to be D/C'd completely for before a meaningful neuro exam can take place. · GI -  NG TF infusing w/o difficulty.    · Prophylaxis - SCDs, INR elevated; protonix  · Discussed with nursing, neurology, nephrology and cardiology          The patient is: [] acutely ill Risk of deterioration: [] moderate    [x] critically ill  [x] high     [x]See my orders for details    My assessment/plan was discussed with:  [x]nursing []PT/OT    [x]respiratory therapy [x]Dr. Aquiles Titus []     []Total critical care time exclusive of procedures       minutes    Shelley A Day, PA    ICU Staff  I have seen and evaluated the patient . Patient with GPCs and 2 GNR in sputum and febrile overnight. Changing antibiotics to cover VAP for possible pseudomonas and MRSA. Antibiotics changed to vanco and zosyn with pharmacy to dose and adjust.    I have spoken with neurology staff. Prognosis poor but unable to make further evaluation while on sedation. Will attempt again to wean sedation. Need to arrange family meeting. Prognosis poor. Albert Santiago, DO   Pulmonary, Sleep and Critical Care Medicine

## 2017-07-23 NOTE — PROGRESS NOTES
[facility] - Progress Note  Admit Date: 7/19/2017    Assessment:     -Cardiac arrest, arrived unresponsive, pulseless VT, s/p shock 10 timed by AICD and external shock, currently intubated and unresponsive on hypothermia protocol. Device interrogation revealed appropriate shocks for VT storm. No further events since arrest.  -Encephalopathy with seizure activity, anoxic brain injury versus possible neurologic event.  -History of VT s/p dual chamber [facility] AICD. Patient is followed by Dr. Laura Walker, Dr. Chon Husain, Dr. Harry Holbrook. Previously scheduled for VT ablation, but cancelled due to apical thrombus. Maintained on amiodarone, mexiletine and BB. Device working appropriately on interrogation this admission.  -Elevated troponin, suspect secondary to VT with multiple shocks but known CAD as noted below. Not currently candidate for further coronary work-up.  -H/o apical thrombus, present on echo 12/2016. Maintained on warfarin, INR 2.7 on arrival. No evidence of thrombus on echo this admission.  -Acute renal failure. -Ischemic cardiomyopathy EF 30% on echo this admission unchanged from 12/2016. Not currently decompensated. -CAD s/p history of remote anterior MI, previously followed by Dr. Alphonso Dee. -Paroxysmal atrial fibrillation on remote AICD check.  -Hypertension. Low normal at present.  -Diabetes. On insulin. Hgb A1c 7.8.  -Dyslipidemia. On Crestor.  -Acute on chronic kidney disease.  -Elevated LFTs. -Coagulopathy with INR trending upward despite holding warfarin.      Patient is followed by Dr. Laura Walker, Dr. Chon Husain, Dr. Harry Holbrook. Plan:     Still no meaningful recovery. Rising Cr. Very poor prognosis and not a candidate for invasive evaluation. Subjective:     Patient remains unresponsive with myoclonic twisting. Fevers overnight.     Objective:      Patient Vitals for the past 8 hrs:   Pulse Resp SpO2   07/23/17 0804 60 30 98 %   07/23/17 0529 63 (!) 33 100 %   07/23/17 0144 61 (!) 34 99 %         Patient Vitals for the past 96 hrs:   Weight   07/23/17 0000 107 kg (235 lb 12.8 oz)   07/22/17 0817 90.9 kg (200 lb 4.8 oz)   07/20/17 0400 96.4 kg (212 lb 9.6 oz)   07/19/17 1005 97.1 kg (214 lb)                    Intake/Output Summary (Last 24 hours) at 07/23/17 0854  Last data filed at 07/23/17 0600   Gross per 24 hour   Intake          3665.07 ml   Output              120 ml   Net          3545.07 ml       Physical Exam:  General:  Intubated, not responsive  Neck:  nontender  Lungs:  Decreased anteriorly  Heart:  regular rate and rhythm, S1, S2 normal, no murmur, click, rub or gallop  Abdomen:  abdomen is soft without significant tenderness, masses, organomegaly or guarding  Extremities:  extremities normal, atraumatic, no cyanosis or edema    Data Review:     Labs: Results:       Chemistry Recent Labs      07/23/17   0439  07/22/17   0400  07/21/17   0620   GLU  213*  193*  196*   NA  139  141  139   K  4.8  4.4  4.6   CL  108  110*  107   CO2  20*  21  21   BUN  53*  33*  21*   CREA  4.26*  2.73*  2.11*   CA  7.8*  7.6*  8.1*   MG  2.1  2.1  2.1   PHOS  4.1  4.3  3.9   AGAP  11  10  11   BUCR  12  12  10*   AP  63  72  83   TP  6.1*  6.1*  6.6   ALB  2.1*  2.4*  2.9*   GLOB  4.0  3.7  3.7   AGRAT  0.5*  0.6*  0.8      CBC w/Diff Recent Labs      07/23/17   0439 07/22/17   0400  07/21/17   0620   WBC  8.5  11.6  13.9*   RBC  3.17*  3.53*  3.95*   HGB  8.7*  9.6*  10.6*   HCT  26.1*  29.7*  32.6*   PLT  173  192  232   GRANS  33*  80*  91*   LYMPH  9*  2*  4*   EOS  0  0  0      Cardiac Enzymes No results found for: CPK, CKMMB, CKMB, RCK3, CKMBT, CKNDX, CKND1, LAMONT, TROPT, TROIQ, LISA, TROPT, TNIPOC, BNP, BNPP   Coagulation Recent Labs      07/23/17   0439  07/22/17   0400  07/21/17   0620  07/20/17   2200   PTP  17.8*  17.1*  47.4*  43.5*   INR  1.5*  1.5*  5.6*  5.0*   APTT   --    --   79.0*  71.7*       Lipid Panel Lab Results   Component Value Date/Time    Cholesterol, total 206 09/21/2016 01:24 PM    HDL Cholesterol 51 09/21/2016 01:24 PM    LDL, calculated 129 09/21/2016 01:24 PM    VLDL, calculated 26 09/21/2016 01:24 PM    Triglyceride 130 09/21/2016 01:24 PM    CHOL/HDL Ratio 4.0 09/21/2016 01:24 PM      BNP No results found for: BNP, BNPP, XBNPT   Liver Enzymes Recent Labs      07/23/17   0439   TP  6.1*   ALB  2.1*   AP  63   SGOT  53*      Digoxin    Thyroid Studies No results found for: T4, T3U, TSH, TSHEXT       Signed By: Perfecto Rodrigez MD     July 23, 2017

## 2017-07-23 NOTE — ROUTINE PROCESS
Bedside and Verbal shift change report given to Rhett Turpin RN (oncoming nurse) by So Wolff RN (offgoing nurse). Report included the following information SBAR, Kardex, Intake/Output, MAR and Med Rec Status.

## 2017-07-23 NOTE — ROUTINE PROCESS
No change in status on walking rounds. Turned and repositioned by Katherine Bach form the Lift Team. SR up x 4. CB/telehone at hand. 2200  Turned and repositioned in bed. Friend at bedside. In no apparent distress. SR up x 4. CB/telephone at hand. Milla Rogers from [employer] called and given an update on patient's status. No change noted. SR up x 4. CB/telephone at hand. 0100  Medicated with Ativan as ordered which did not slow down his breathing at this time. No change in status. SR up x 4. CB/telephone at hand. 0300  No change in status. Turned and repositioned in bed. SR up x 4. CB/telephone at hand. 0500  No change in status. Turned and repositioned in bed. SR up x 4. CB/telephone at hand.

## 2017-07-23 NOTE — PROGRESS NOTES
Pt's brother Eitan Romero said he and his other brother are leaning towards comfort care. They are going to talk to the rest of the family tomorrow (Monday) and will most likely want to compassionately extubate on Tuesday. Answered questions and acknowledged concerns. Moral support given. Eitan Romero will notify nursing staff/ MDs when decision is made.

## 2017-07-23 NOTE — ROUTINE PROCESS
Unresponsive on walking rounds but he does appear to be fairly comfortable at this time. Turned and repositioned with assistance of [employer]. SR up x 4. CB/telephone at hand. 2230 turned and repositioned in bed. No change in status SR up x 4, CB.telephone at hand. 0030  Turned and repositioned in bed. No change in status. SR up x 4. CB/telephone at hand    0330    No change in status. 2400 [facility] provided with linen change. Turned and repositioned in bed with assistance of staff. SR up x 4. CB/telephone at hand.

## 2017-07-23 NOTE — PROGRESS NOTES
Hospitalist Progress Note    Patient: Montez [place] Hunter Age: 74 y.o. : 1950 MR#: [id number] SSN: [id number]  Date/Time: 2017 9:13 AM    Subjective:     Patients continues with Febrile episode through the night. Clindamycin started yesterday due to concern for Staph positive in 1 bottle of blood culture. Urine grew Gram negative. He continues to be unresponsive with sedation. Creatinine is increasing since yesterday. Attempt to call Chao Oscar (brother) [phone] with no answer. Patient was initially brought to ED by his brother because his AICD shocked him. He was having chest pain with shocks. He became unresponsive during his drive to the ED, unknown duration. On arrival to the ER, CPR was started. He was intubated   and placed on cooling protocol post cardiac arrest. He has been re-warmed but remains encephalopathic with myoclonus jerks. He troy ventilation dependent. ROS: unable due to sedated and unresponsive    Objective:   VS:   Visit Vitals    BP 99/47 (BP 1 Location: Left arm, BP Patient Position: At rest)    Pulse 60    Temp 99.4 °F (37.4 °C)    Resp 30    Ht 6' 1\" (1.854 m)    Wt 107 kg (235 lb 12.8 oz)    SpO2 98%    BMI 31.11 kg/m2      Tmax/24hrs: Temp (24hrs), Av.4 °F (38 °C), Min:99.4 °F (37.4 °C), Max:101.2 °F (38.4 °C)      Intake/Output Summary (Last 24 hours) at 17 0913  Last data filed at 17 0600   Gross per 24 hour   Intake          3635.07 ml   Output              120 ml   Net          3515.07 ml       General: Sedated and unresponsive. Still maintain on ventilation. Family at bedside   HEENT: Pin point pupils, NC/AT, intubated, no JVD  Cardiovascular:  S1S2 regular, no rub/gallop  Pulmonary:  Air entry bilaterally  GI:  Soft, non tender, non distended   Extremities:  No pedal edema, distal pulses appreciated   Neuro: sedated, unable to completed.     Additional:   Current Facility-Administered Medications   Medication Dose Route Frequency  ELECTROLYTE REPLACEMENT PROTOCOL  1 Each Other PRN    ELECTROLYTE REPLACEMENT PROTOCOL  1 Each Other PRN    ELECTROLYTE REPLACEMENT PROTOCOL  1 Each Other PRN    acetaminophen (TYLENOL) solution 1,000 mg  1,000 mg Per NG tube Q6H PRN    clindamycin phosphate (CLEOCIN) 600 mg in 0.9% sodium chloride (MBP/ADV) 100 mL ADV  600 mg IntraVENous Q8H    LORazepam (ATIVAN) injection 2-4 mg  2-4 mg IntraVENous Q2H PRN    insulin lispro (HUMALOG) injection   SubCUTAneous Q6H    midazolam in normal saline (VERSED) 1 mg/mL infusion  0-10 mg/hr IntraVENous TITRATE    levETIRAcetam (KEPPRA) 500 mg in 100 ml IVPB  500 mg IntraVENous Q12H    propofol (DIPRIVAN) infusion  5-50 mcg/kg/min IntraVENous TITRATE    fentaNYL citrate (PF) injection  mcg   mcg IntraVENous Q4H PRN    amiodarone (NEXTERONE) 360 mg in dextrose 200 mL (1.8 mg/mL) infusion  0.5 mg/min IntraVENous TITRATE    0.9% sodium chloride infusion  50 mL/hr IntraVENous CONTINUOUS    hydroxypropyl methylcellulose (GONAK) 2.5 % ophthalmic solution 1 Drop  1 Drop Both Eyes Q12H    glucose chewable tablet 16 g  4 Tab Oral PRN    glucagon (GLUCAGEN) injection 1 mg  1 mg IntraMUSCular PRN    dextrose (D50W) injection syrg 12.5-25 g  25-50 mL IntraVENous PRN    pantoprazole (PROTONIX) 40 mg in sodium chloride 0.9 % 10 mL injection  40 mg IntraVENous DAILY          Labs:    Recent Results (from the past 24 hour(s))   GLUCOSE, POC    Collection Time: 07/22/17 10:44 AM   Result Value Ref Range    Glucose (POC) 184 (H) 70 - 110 mg/dL   POC G3    Collection Time: 07/22/17 11:15 AM   Result Value Ref Range    Device: VENT      FIO2 (POC) 45 %    pH (POC) 7.409 7.35 - 7.45      pCO2 (POC) 26.7 (L) 35.0 - 45.0 MMHG    pO2 (POC) 48 (LL) 80 - 100 MMHG    HCO3 (POC) 16.9 (L) 22 - 26 MMOL/L    sO2 (POC) 85 (L) 92 - 97 %    Base deficit (POC) 8 mmol/L    Mode ASSIST CONTROL      Set Rate 14 bpm    PEEP/CPAP (POC) 8 cmH2O    PIP (POC) 22      Allens test (POC) N/A      Inspiratory Time 1 sec    Total resp. rate 30      Site DRAWN FROM ARTERIAL LINE      Patient temp. 98.2      Specimen type (POC) ARTERIAL      Performed by Lynnetta Lennert     Pressure control YES     POC G3    Collection Time: 07/22/17 12:55 PM   Result Value Ref Range    Device: VENT      FIO2 (POC) 80 %    pH (POC) 7.386 7.35 - 7.45      pCO2 (POC) 30.3 (L) 35.0 - 45.0 MMHG    pO2 (POC) 68 (L) 80 - 100 MMHG    HCO3 (POC) 18.2 (L) 22 - 26 MMOL/L    sO2 (POC) 93 92 - 97 %    Base deficit (POC) 7 mmol/L    Mode ASSIST CONTROL      Set Rate 14 bpm    PEEP/CPAP (POC) 10 cmH2O    PIP (POC) 24      Allens test (POC) N/A      Inspiratory Time 0.85 sec    Total resp.  rate 34      Site DRAWN FROM ARTERIAL LINE      Patient temp. 98.2      Specimen type (POC) ARTERIAL      Performed by Lynnetta [employer]     Pressure control YES     CULTURE, BLOOD    Collection Time: 07/22/17  3:57 PM   Result Value Ref Range    Special Requests: NO SPECIAL REQUESTS      Culture result: NO GROWTH AFTER 13 HOURS     CULTURE, BLOOD    Collection Time: 07/22/17  4:00 PM   Result Value Ref Range    Special Requests: NO SPECIAL REQUESTS      Culture result: NO GROWTH AFTER 11 HOURS     CULTURE, RESPIRATORY/SPUTUM/BRONCH W GRAM STAIN    Collection Time: 07/22/17  4:15 PM   Result Value Ref Range    Special Requests: NO SPECIAL REQUESTS      GRAM STAIN MODERATE WBC'S      GRAM STAIN MODERATE GRAM POSITIVE COCCI IN PAIRS IN GROUPS      GRAM STAIN FEW GRAM NEGATIVE RODS      Culture result: PENDING    POC G3    Collection Time: 07/22/17  6:09 PM   Result Value Ref Range    Device: VENT      FIO2 (POC) 80 %    pH (POC) 7.377 7.35 - 7.45      pCO2 (POC) 30.8 (L) 35.0 - 45.0 MMHG    pO2 (POC) 67 (L) 80 - 100 MMHG    HCO3 (POC) 17.8 (L) 22 - 26 MMOL/L    sO2 (POC) 91 (L) 92 - 97 %    Base deficit (POC) 7 mmol/L    Mode ASSIST CONTROL      Set Rate 14 bpm    PEEP/CPAP (POC) 10 cmH2O    PIP (POC) 25      Allens test (POC) N/A      Inspiratory Time 0.6 sec Total resp. rate 38      Site DRAWN FROM ARTERIAL LINE      Patient temp. 38.4      Specimen type (POC) ARTERIAL      Performed by Marquis Balderas     Pressure control YES     GLUCOSE, POC    Collection Time: 07/22/17  6:30 PM   Result Value Ref Range    Glucose (POC) 281 (H) 70 - 110 mg/dL   GLUCOSE, POC    Collection Time: 07/23/17 12:16 AM   Result Value Ref Range    Glucose (POC) 241 (H) 70 - 110 mg/dL   CBC WITH AUTOMATED DIFF    Collection Time: 07/23/17  4:39 AM   Result Value Ref Range    WBC 8.5 4.6 - 13.2 K/uL    RBC 3.17 (L) 4.70 - 5.50 M/uL    HGB 8.7 (L) 13.0 - 16.0 g/dL    HCT 26.1 (L) 36.0 - 48.0 %    MCV 82.3 74.0 - 97.0 FL    MCH 27.4 24.0 - 34.0 PG    MCHC 33.3 31.0 - 37.0 g/dL    RDW 14.7 (H) 11.6 - 14.5 %    PLATELET 883 701 - 953 K/uL    MPV 10.5 9.2 - 11.8 FL    NEUTROPHILS 33 (L) 42 - 75 %    BAND NEUTROPHILS 49 (H) 0 - 5 %    LYMPHOCYTES 9 (L) 20 - 51 %    MONOCYTES 9 2 - 9 %    EOSINOPHILS 0 0 - 5 %    BASOPHILS 0 0 - 3 %    ABS. NEUTROPHILS 2.8 1.8 - 8.0 K/UL    ABS. LYMPHOCYTES 0.8 0.8 - 3.5 K/UL    ABS. MONOCYTES 0.8 0 - 1.0 K/UL    ABS. EOSINOPHILS 0.0 0.0 - 0.4 K/UL    ABS.  BASOPHILS 0.0 0.0 - 0.1 K/UL    PLATELET COMMENTS LARGE PLATELETS      RBC COMMENTS ANISOCYTOSIS  1+        RBC COMMENTS POIKILOCYTOSIS  2+        RBC COMMENTS OVALOCYTES  1+        RBC COMMENTS JAN CELLS  1+        WBC COMMENTS REACTIVE LYMPHS      DF MANUAL     METABOLIC PANEL, COMPREHENSIVE    Collection Time: 07/23/17  4:39 AM   Result Value Ref Range    Sodium 139 136 - 145 mmol/L    Potassium 4.8 3.5 - 5.5 mmol/L    Chloride 108 100 - 108 mmol/L    CO2 20 (L) 21 - 32 mmol/L    Anion gap 11 3.0 - 18 mmol/L    Glucose 213 (H) 74 - 99 mg/dL    BUN 53 (H) 7.0 - 18 MG/DL    Creatinine 4.26 (H) 0.6 - 1.3 MG/DL    BUN/Creatinine ratio 12 12 - 20      GFR est AA 17 (L) >60 ml/min/1.73m2    GFR est non-AA 14 (L) >60 ml/min/1.73m2    Calcium 7.8 (L) 8.5 - 10.1 MG/DL    Bilirubin, total 0.2 0.2 - 1.0 MG/DL    ALT (SGPT) 132 (H) 16 - 61 U/L    AST (SGOT) 53 (H) 15 - 37 U/L    Alk. phosphatase 63 45 - 117 U/L    Protein, total 6.1 (L) 6.4 - 8.2 g/dL    Albumin 2.1 (L) 3.4 - 5.0 g/dL    Globulin 4.0 2.0 - 4.0 g/dL    A-G Ratio 0.5 (L) 0.8 - 1.7     PHOSPHORUS    Collection Time: 07/23/17  4:39 AM   Result Value Ref Range    Phosphorus 4.1 2.5 - 4.9 MG/DL   PROTHROMBIN TIME + INR    Collection Time: 07/23/17  4:39 AM   Result Value Ref Range    Prothrombin time 17.8 (H) 11.5 - 15.2 sec    INR 1.5 (H) 0.8 - 1.2     MAGNESIUM    Collection Time: 07/23/17  4:39 AM   Result Value Ref Range    Magnesium 2.1 1.6 - 2.6 mg/dL   POC G3    Collection Time: 07/23/17  5:37 AM   Result Value Ref Range    Device: VENT      FIO2 (POC) 0.80 %    pH (POC) 7.389 7.35 - 7.45      pCO2 (POC) 32.4 (L) 35.0 - 45.0 MMHG    pO2 (POC) 96 80 - 100 MMHG    HCO3 (POC) 19.5 (L) 22 - 26 MMOL/L    sO2 (POC) 97 92 - 97 %    Base deficit (POC) 5 mmol/L    Mode ASSIST CONTROL      Set Rate 14 bpm    PEEP/CPAP (POC) 10 cmH2O    PIP (POC) 13      Allens test (POC) N/A      Inspiratory Time 0.80 sec    Total resp. rate 37      Site DRAWN FROM ARTERIAL LINE      Patient temp.  99.7      Specimen type (POC) ARTERIAL      Performed by [facility]     Pressure control YES     GLUCOSE, POC    Collection Time: 07/23/17  6:39 AM   Result Value Ref Range    Glucose (POC) 181 (H) 70 - 110 mg/dL   EKG, 12 LEAD, SUBSEQUENT    Collection Time: 07/23/17  7:21 AM   Result Value Ref Range    Ventricular Rate 63 BPM    Atrial Rate 63 BPM    P-R Interval 246 ms    QRS Duration 106 ms    Q-T Interval 420 ms    QTC Calculation (Bezet) 429 ms    Calculated P Axis 99 degrees    Calculated R Axis 92 degrees    Calculated T Axis -89 degrees    Diagnosis       Suspect arm lead reversal, interpretation assumes no reversal  Electronic atrial pacemaker  Low voltage QRS  Anterolateral infarct (cited on or before 21-JUL-2017)  ST & T wave abnormality, consider inferior ischemia  Abnormal ECG  When compared with ECG of 22-JUL-2017 07:00,  No significant change was found  Confirmed by Don Thorpe MD, [place] ([id number]) on 7/23/2017 8:31:36 AM       EEG:  ELECTROENCEPHALOGRAM INTERPRETATION: This is a  markedly abnormal recording due to the presence of marked  suppression of background activity. The patient had been on sedation,  however, so does not fit the requirement for electrocortical silence. Unvarying suppression of background activity without reactivity or poor  prognostic indicators for meaningful recovery. No epileptiform  discharges were noted. Chest Xray:  Impression:       1. Support tubes/devices, as above. 2. Stable enlargement of the cardiac silhouette. Probable mild pulmonary  vascular congestion. 3. Increased bibasilar hazy opacities, left greater than right, possibly  atelectasis versus infiltrate/edema. Small left effusion is not excluded. 2D echocardiogram:  SUMMARY:  Procedure information: This was a technically difficult study. Intravenous  contrast (Definity) was administered. Left ventricle: Systolic function was moderately to markedly reduced by  visual assessment. Ejection fraction was estimated to be 30 %. There was  severe hypokinesis of the mid-apical anterior, basal-mid inferoseptal,  entire inferior, apical septal, apical lateral, and apical wall(s). Wall  thickness was mildly to moderately increased. Right atrium: The atrium was mildly dilated. Mitral valve: There was mild regurgitation. Aorta, systemic arteries: The root exhibited mild dilatation.     All Micro Results     Procedure Component Value Units Date/Time    CULTURE, BLOOD [[id number]] Collected:  07/22/17 1557    Order Status:  Completed Specimen:  Blood from Blood Updated:  07/23/17 0646     Special Requests: NO SPECIAL REQUESTS        Culture result: NO GROWTH AFTER 13 HOURS       CULTURE, BLOOD [[id number]] Collected:  07/22/17 1600    Order Status:  Completed Specimen:  Blood from Blood Updated:  07/23/17 6354     Special Requests: NO SPECIAL REQUESTS        Culture result: NO GROWTH AFTER 11 HOURS       CULTURE, BLOOD [[id number]] Collected:  07/19/17 1530    Order Status:  Completed Specimen:  Blood from Blood Updated:  07/23/17 0645     Special Requests: NO SPECIAL REQUESTS        Culture result: NO GROWTH 4 DAYS       CULTURE, RESPIRATORY/SPUTUM/BRONCH Blinda Savant STAIN [[id number]] Collected:  07/22/17 1615    Order Status:  Completed Specimen:  Sputum from Sputum,ET Suction Updated:  07/22/17 2154     Special Requests: NO SPECIAL REQUESTS        GRAM STAIN MODERATE WBC'S                 MODERATE GRAM POSITIVE COCCI IN PAIRS IN GROUPS      FEW GRAM NEGATIVE RODS        Culture result: PENDING    CULTURE, URINE [[id number]] Collected:  07/22/17 1735    Order Status:  Completed Specimen:  Urine from [place] Specimen Updated:  07/22/17 2121    CULTURE, URINE [[id number]] Collected:  07/20/17 0900    Order Status:  Completed Specimen:  [place] Specimen Updated:  07/22/17 0838     Special Requests: NO SPECIAL REQUESTS        Culture result: NO GROWTH 2 DAYS       CULTURE, BLOOD [[id number]]  (Abnormal)  (Susceptibility) Collected:  07/19/17 1530    Order Status:  Completed Specimen:  Blood from Blood Updated:  07/22/17 0708     Special Requests: NONE        GRAM STAIN         AEROBIC AND ANAEROBIC BOTTLES GRAM POSITIVE COCCI              SMEAR CALLED TO AND CORRECTLY REPEATED BY: TOYA RN CCU 1130 7/20 TO [facility]     Culture result:         ANAEROBIC BOTTLE STAPHYLOCOCCUS EPIDERMIDIS (A)              AEROBIC BOTTLE STAPHYLOCOCCUS LUGDUNENSIS (A)          Assessment/Plan:     1. Cardio:    A. Cardiac arrest: Pulseless VT, post AICD shock, off from hypothermia protocol       - device interrogated, normal       - on Amiodarone drip       - cardiology follows     B.  CAD with recent troponin elevation, secondary to VT and shocks    C.   History of VT with AICD, to schedule for VT ablation but cancelled due to apical thrombus    D. Ischemic cardiomyopathy, EF 30%, proBNP not elevated on admission        - recheck proBNP today. Might need fluid as he looks dry    E.  Paroxysmal atrial fibrillation, in sinus rhythm     2. Resp:     A. Ventilation dependent hypoxic respiratory failure, remains intubated and unable to wean off ventilation        - Vent cont, management per ICU protocol        - will follow up with family as they had meeting concerning to his care    3. Neuro:     A. Encephalopathy, likely Anoxic brain injury,       - need to wean off sedation if tolerated. Poor prognosis      - will need to update family and have goal of care      B. Persistent myoclonus, ?seizure, on keppra. , improved with increase sedation      4. :     A. NUSRAT on CKD III, suspected from hypoperfusion      - creatinine increase, will have renal consult today    5. GI:     A. Liver shock wit transaminitis, improving. B.  On GI feed    6. ID:     A. Positive blood culture, with Staph, ?contamination      - repeat cultures and start clindamycin      B. Urine with gram negative rods and gram positive cocci       - on ceftriaxone, previously on clindamycin for staph    7. Endo:     A.  DMT2. On ISS per ICU protocol    8. Heme/Onc:      A. Hypercoagulable state, improved. Off coumadin       Disposition: very poor prognosis.   Attempting to get in touch with his family for update and plan for goal of care    Additional Notes:    Time spent 40 minutes    Case discussed with:  []Patient  [x]Family  [x]Nursing  []Case Management  DVT Prophylaxis:  []Lovenox  []Hep SQ  [x]SCDs  []Coumadin   []On Heparin gtt    Signed By: Kat Marshall MD     July 23, 2017 9:13 AM

## 2017-07-24 NOTE — PROGRESS NOTES
NUTRITION    Nutrition Screen      RECOMMENDATIONS / PLAN:     - Resume tube feeding of Glucerna 1.5 if residual volume is less than 500 mL and advance as tolerated to goal rate of 60 mL/hr with 150 mL q 6 hour water flushes.  - Continue IV fluid for hydration as medically appropriate. - Provide nutrition interventions consistent with plan of care. - Continue RD inpatient monitoring and evaluation. Goal Regimen: Glucerna 1.5 at 60 mL/hr + 150 mL q 6 hour water flushes to provide: 2160 kcal, 119 gm protein, 108 gm fat, 192 gm CHO, 23 gm fiber, 1093 mL free water, 100% RDIs     NUTRITION INTERVENTIONS & DIAGNOSIS:     [x] IV fluid: NS at 50 mL/hr    [x] Enteral nutrition support: resume as medically appropriate   [x] Vitamin/mineral supplementation: electrolyte replacement protocol  [x] Coordination of care: interdisciplinary rounds     Nutrition Diagnosis: Inadequate oral intake related to inability to tolerate po as evidenced by NPO. ASSESSMENT:     7/24: Renal function worsening and poor UOP, Crea up to 4.14, continue IV fluid per Nephrology. Feedings up to 60 mL/hr then held this morning for 560 mL residual. Possible compassionate extubation tomorrow. 7/22: Tolerating feeding advancement. Sodium WNL, developed trace edema, minimal UOP.  7/21: Pt anticipated to reach goal rewarming temperature today, will start feedings once protocol completed per MD.   7/20: Pt intubated on hypothermia protocol- to start rewarming at 15:00 today. OGT to continuous suction.      Average po intake adequate to meet patients estimated nutritional needs:   [] Yes     [x] No   [] Unable to determine at this time    EN infusion adequate to meet patients estimated nutritional needs:   [] Yes     [x] No   [] Unable to determine at this time    Tube Feeding: Glucerna 1.5 at 60 mL/hr via OGT (held 7/24 AM)  Water Flushes: 150 mL q 6 hours (held)  Residuals: 560 mL at 08:00 on 7/24 (previously 30-40 mL over the last 24 hours)    Diet: DIET NPO  DIET TUBE FEEDING      Food Allergies: NKFA  Current Appetite:   [] Good     [] Fair     [] Poor     [x] Other: NPO  Appetite/meal intake prior to admission:   [] Good     [] Fair     [] Poor     [x] Other: unknown   Feeding Limitations:  [] Swallowing difficulty    [] Chewing difficulty    [x] Other: intubated   Current Meal Intake: No data found. BM: 7/23, loose  Skin Integrity: WDL  Edema: 2+ generalized   Pertinent Medications: Reviewed: propofol at 20 mcg/kg/min (306 kcal per day)- plan to wean off today per RN    Recent Labs      07/24/17   0330  07/23/17   0439  07/22/17   0400   NA  141  139  141   K  4.1  4.8  4.4   CL  111*  108  110*   CO2  21  20*  21   GLU  220*  213*  193*   BUN  65*  53*  33*   CREA  4.14*  4.26*  2.73*   CA  7.4*  7.8*  7.6*   MG  2.2  2.1  2.1   PHOS  3.6  4.1  4.3   ALB  1.7*  2.1*  2.4*   SGOT  38*  53*  77*   ALT  85*  132*  207*       Intake/Output Summary (Last 24 hours) at 07/24/17 1011  Last data filed at 07/24/17 0900   Gross per 24 hour   Intake           4127.1 ml   Output             1080 ml   Net           3047.1 ml       Anthropometrics:  Ht Readings from Last 1 Encounters:   07/19/17 6' 1\" (1.854 m)     Last 3 Recorded Weights in this Encounter    07/22/17 0817 07/23/17 0000 07/24/17 0200   Weight: 90.9 kg (200 lb 4.8 oz) 107 kg (235 lb 12.8 oz) 109 kg (240 lb 4.8 oz)     Body mass index is 31.7 kg/(m^2).           Weight History:   Weight Metrics 7/24/2017 12/29/2016 8/9/2016 7/28/2016 3/31/2016 3/17/2016 2/5/2016   Weight 240 lb 4.8 oz 215 lb 219 lb 215 lb 218 lb 218 lb 228 lb   BMI 31.7 kg/m2 28.37 kg/m2 29.7 kg/m2 29.15 kg/m2 29.56 kg/m2 29.56 kg/m2 30.92 kg/m2        Admitting Diagnosis: Cardiac arrest ([facility] [address]  Pertinent PMHx: CKD, DM, HF    Education Needs:        [x] None identified  [] Identified - Not appropriate at this time  []  Identified and addressed - refer to education log  Learning Limitations:   [] None identified  [x] Identified: intubated     Cultural, Manda[place] & ethnic food preferences:  [x] None identified    [] Identified and addressed     ESTIMATED NUTRITION NEEDS:     Calories: 7901-4286 kcal ([id number]) based on  [x] Actual BW 96 kg     [] IBW   Protein: 115-192 gm (1.2-2 gm/kg) based on  [x] Actual BW      [] IBW   Fluid: 1 mL/kcal     MONITORING & EVALUATION:     Nutrition Goal(s):  1. Nutritional needs will be met through adequate oral intake or nutrition support within the next 7 days. Outcome:  [] Met/Ongoing    [x]  Not Met/Progressing    [] New/Initial Goal   2. Provide nutrition intervention as appropriate with goals of care for the next 5-7 days.  Outcome:  [] Met/Ongoing    []  Not Met    [x] New/Initial Goal     Monitoring:   [x] EN tolerance   [x] EN infusion   [] Supplement intake   [x] GI symptoms/ability to tolerate po diet   [x] Respiratory status   [x] Plan of care      Previous Recommendations (for follow-up assessments only):     [x]   Implemented       []   Not Implemented (RD to address)     [] No Recommendation Made     Discharge Planning: pending ability to tolerate po and plan of care  [x] Participated in care planning, discharge planning, & interdisciplinary rounds as appropriate      Jasson Current, [address], [address] [place] [address]    Pager: [phone]

## 2017-07-24 NOTE — ROUTINE PROCESS
Bedside and Verbal shift change report given to ROMÁN Mohan RN (oncoming nurse) by Ervin Wolff RN (offgoing nurse). Report included the following information SBAR, Kardex, Intake/Output, MAR and Recent Results.

## 2017-07-24 NOTE — PROGRESS NOTES
Re:  Jo Guerrero,Follow up visit     7/24/2017 2:26 PM    SSN: [id number]    Subjective:   Guevara Souza is seen in follow up. Both Propofol, and Fentanyl sedation have been stopped earlier today. He's had some myoclonic twitching according to the staff, but only minimal compared to earlier in his course.       Medications:    Current Facility-Administered Medications   Medication Dose Route Frequency Provider Last Rate Last Dose    piperacillin-tazobactam (ZOSYN) 3.375 g in 0.9% sodium chloride (MBP/ADV) 100 mL MBP  3.375 g IntraVENous Q6H Nhung Kaba  mL/hr at 07/24/17 1023 3.375 g at 07/24/17 1023    VANCOMYCIN INFORMATION NOTE   Other CONTINUOUS Shelley A Day, PA        ELECTROLYTE REPLACEMENT PROTOCOL  1 Each Other PRN Shelley A Day, PA        ELECTROLYTE REPLACEMENT PROTOCOL  1 Each Other PRN Shelley A Day, PA        ELECTROLYTE REPLACEMENT PROTOCOL  1 Each Other PRN Shelley A Day, PA        acetaminophen (TYLENOL) solution 1,000 mg  1,000 mg Per NG tube Q6H PRN Shelley A Day, PA   1,000 mg at 07/23/17 1705    LORazepam (ATIVAN) injection 2-4 mg  2-4 mg IntraVENous Q2H PRN Leighann Sena NP   4 mg at 07/24/17 0009    insulin lispro (HUMALOG) injection   SubCUTAneous Q6H Govind Engel MD   6 Units at 07/24/17 1158    levETIRAcetam (KEPPRA) 500 mg in 100 ml IVPB  500 mg IntraVENous Q12H Leighann Sena  mL/hr at 07/24/17 1401 500 mg at 07/24/17 1401    propofol (DIPRIVAN) infusion  5-50 mcg/kg/min IntraVENous TITRATE Fani DALTON PA-C   Stopped at 07/24/17 1023    fentaNYL citrate (PF) injection  mcg   mcg IntraVENous Q4H PRN Leighann Sena NP   100 mcg at 07/21/17 1905    amiodarone (NEXTERONE) 360 mg in dextrose 200 mL (1.8 mg/mL) infusion  0.5 mg/min IntraVENous TITRATE Lisa Pabon MD 16.7 mL/hr at 07/24/17 0721 0.5 mg/min at 07/24/17 0721    0.9% sodium chloride infusion  50 mL/hr IntraVENous CONTINUOUS Klever Flores MD 50 mL/hr at 07/23/17 1500 50 mL/hr at 07/23/17 1500    hydroxypropyl methylcellulose (GONAK) 2.5 % ophthalmic solution 1 Drop  1 Drop Both Eyes Q12H Warden Cherelle MD   1 Drop at 07/24/17 1401    glucose chewable tablet 16 g  4 Tab Oral PRN Warden Cherelle MD        glucagon (GLUCAGEN) injection 1 mg  1 mg IntraMUSCular PRN Warden Cherelle MD        dextrose (D50W) injection syrg 12.5-25 g  25-50 mL IntraVENous PRN Warden Cherelle MD   12.5 g at 07/20/17 1314    pantoprazole (PROTONIX) 40 mg in sodium chloride 0.9 % 10 mL injection  40 mg IntraVENous DAILY Warden Cherelle MD   40 mg at 07/24/17 0840       Vital signs:    Visit Vitals    /51    Pulse 60    Temp 99.6 °F (37.6 °C)    Resp 14    Ht 6' 1\" (1.854 m)    Wt 109 kg (240 lb 4.8 oz)    SpO2 100%    BMI 31.7 kg/m2       Review of Systems:   Could not be obtained from the patient because of the medical status.       Patient Active Problem List   Diagnosis Code    Hyperlipidemia E78.5    DM [facility] E11.9    Essential hypertension, benign I10    Ventricular tachycardia (HCC) I47.2    Congestive heart failure (HCC) I50.9    Other and unspecified angina pectoris I20.9    Chronic kidney disease, stage III (moderate) N18.3    Type 2 diabetes mellitus without complication (HCC) Y02.3    SBO (small bowel obstruction) ([facility]) K56.69    Type 2 diabetes mellitus with stage 2 chronic kidney disease (HCC) E11.22, N18.2    Generalized ischemic myocardial dysfunction I25.5    Automatic implantable cardioverter-defibrillator in situ Z95.810    Hypertension I10    Diabetes mellitus type 1A ([facility] E10.9    Atrial paroxysmal tachycardia (HCC) I47.1    CKD (chronic kidney disease) stage 3, GFR 30-59 ml/min N18.3    Diabetes (HCC) T77.1    Systolic heart failure (HCC) I50.20    AICD (automatic cardioverter/defibrillator) present Z95.810    Ischemic cardiomyopathy I25.5    MI (myocardial infarction) ([facility] I21.3    Apical mural thrombus [id number]    Cardiac arrest ([facility] I46.9         Objective: The patient is deeply comatose, on the ventilator. He's noticed to have some spontaneous twitching of the face, nothing of the body at this time. The twitching increases with painful stimuli to his extremities. Cranial Nerves: II  Visual fields are not testable. Both pupils are 2 mm and unreactive to light. III, IV, VI  Extraocular movements are absent to doll's eye's manuver. There is no nystagmus. V  corneal responses are depressed. VII  Face is symmetrical.  VIII - Hearing is not testable. IX, X, XII  Palate is symmetrical.   Motor: The patient has no movement to painful stimuli of the extremities. Tone is normal. Reflexes are 2+ and symmetrical. Plantars are up going. Gait is could not be tested. Final result (Exam End: 7/19/2017 11:26 AM) Open    Study Result   EXAM:  CT HEAD WO CONT     INDICATION:   cardiac arrest, unresponsive     COMPARISON: None.     TECHNIQUE: Unenhanced CT of the head was performed using 5 mm images. Brain and  bone windows were generated. CT dose reduction was achieved through use of a  standardized protocol tailored for this examination and automatic exposure  control for dose modulation.      FINDINGS:  The ventricles and sulci are normal in size, shape and configuration and  midline. There is mild white matter disease. There are physiologic  calcifications of the basal ganglia. There is no intracranial hemorrhage,  extra-axial collection, mass, mass effect or midline shift. The basilar  cisterns are open. No acute infarct is identified. The bone windows demonstrate  no abnormalities. The visualized portions of the paranasal sinuses and mastoid  air cells are clear.     IMPRESSION  IMPRESSION: Mild probably ischemic white matter disease. Otherwise unremarkable.            I have reviewed the above imagines myself.     CBC:   Lab Results   Component Value Date/Time    WBC 7.3 07/24/2017 03:30 AM RBC 2.78 07/24/2017 03:30 AM    HGB 7.6 07/24/2017 03:30 AM    HCT 22.8 07/24/2017 03:30 AM    PLATELET 577 62/88/0783 03:30 AM     BMP:   Lab Results   Component Value Date/Time    Glucose 220 07/24/2017 03:30 AM    Sodium 141 07/24/2017 03:30 AM    Potassium 4.1 07/24/2017 03:30 AM    Chloride 111 07/24/2017 03:30 AM    CO2 21 07/24/2017 03:30 AM    BUN 65 07/24/2017 03:30 AM    Creatinine 4.14 07/24/2017 03:30 AM    Calcium 7.4 07/24/2017 03:30 AM     CMP:   Lab Results   Component Value Date/Time    Glucose 220 07/24/2017 03:30 AM    Sodium 141 07/24/2017 03:30 AM    Potassium 4.1 07/24/2017 03:30 AM    Chloride 111 07/24/2017 03:30 AM    CO2 21 07/24/2017 03:30 AM    BUN 65 07/24/2017 03:30 AM    Creatinine 4.14 07/24/2017 03:30 AM    Calcium 7.4 07/24/2017 03:30 AM    Anion gap 9 07/24/2017 03:30 AM    BUN/Creatinine ratio 16 07/24/2017 03:30 AM    Alk. phosphatase 59 07/24/2017 03:30 AM    Protein, total 5.6 07/24/2017 03:30 AM    Albumin 1.7 07/24/2017 03:30 AM    Globulin 3.9 07/24/2017 03:30 AM    A-G Ratio 0.4 07/24/2017 03:30 AM     Coagulation:   Lab Results   Component Value Date/Time    Prothrombin time 21.9 07/24/2017 03:30 AM    INR 2.0 07/24/2017 03:30 AM    aPTT 79.0 07/21/2017 06:20 AM     Cardiac markers:   Lab Results   Component Value Date/Time     07/20/2017 03:59 AM    CK-MB Index 5.9 07/20/2017 03:59 AM       Assessment:  Now day 5 post code encephalopathy with post arrest myoclonus in this patient who has risk factors including cardiac arrest.    Plan:  Now off sedation he continues to have a slight bit of myoclonus, but less apparently than earlier ion his course. Poor prognosis for continued myoclonus. Will get EEG for help with prognosis. Continue Keppra. Will follow. Sincerely,        Nona Delgado.  Perla Toledo M.D.

## 2017-07-24 NOTE — PROGRESS NOTES
Subjective:  Symptoms:  Stable. Diet:  NPO. Patient intubated and sedated on vent with propofol and versed due to Myoclonic jerking activity  Per all reports, he had not awoken since admit and follows no commands  No events overnight    Temp:  [96.8 °F (36 °C)-101.4 °F (38.6 °C)]   Pulse (Heart Rate):  [58-89]   BP: ()/()   Resp Rate:  [9-43]   O2 Sat (%):  [94 %-100 %]   Weight:  [90.9 kg (200 lb 4.8 oz)-109 kg (240 lb 4.8 oz)]   07/24 0701 - 07/24 1900  In: 30   Out: 690 [Urine:190]  07/22 1901 - 07/24 0700  In: 6179 [I.V.:3139]  Out: 560 [Urine:560]      Objective:  General Appearance:  Ill-appearing, comfortable and in no acute distress. Vital signs: (most recent): Blood pressure 102/51, pulse 60, temperature 99.6 °F (37.6 °C), resp. rate 17, height 6' 1\" (1.854 m), weight 109 kg (240 lb 4.8 oz), SpO2 100 %. Output: Producing urine and producing stool. Lungs: Tachypnea and normal effort. Breath sounds clear to auscultation. Heart: Normal rate. Regular rhythm. Neurological: (Intubated and sedated; no myoclonus noted with sedation). Skin:  Warm and dry. Abdomen: Abdomen is soft and non-distended. There are no signs of ascites. Hypoactive bowel sounds. There is no abdominal tenderness. (Within normal limits). Recent Results (from the past 24 hour(s))   GLUCOSE, POC    Collection Time: 07/23/17 12:07 PM   Result Value Ref Range    Glucose (POC) 177 (H) 70 - 110 mg/dL   POC G3    Collection Time: 07/23/17  4:26 PM   Result Value Ref Range    Device: VENT      FIO2 (POC) 0.70 %    pH (POC) 7.386 7.35 - 7.45      pCO2 (POC) 31.9 (L) 35.0 - 45.0 MMHG    pO2 (POC) 80 80 - 100 MMHG    HCO3 (POC) 19.1 (L) 22 - 26 MMOL/L    sO2 (POC) 96 92 - 97 %    Base deficit (POC) 6 mmol/L    Mode ASSIST CONTROL      Set Rate 14 bpm    PEEP/CPAP (POC) 8 cmH2O    PIP (POC) 23      Allens test (POC) N/A      Inspiratory Time 0.8 sec    Total resp.  rate 39      Site DRAWN FROM ARTERIAL LINE      Specimen type (POC) ARTERIAL      Performed by Pallavi Douglas    GLUCOSE, POC    Collection Time: 07/23/17  5:01 PM   Result Value Ref Range    Glucose (POC) 219 (H) 70 - 110 mg/dL   CULTURE, RESPIRATORY/SPUTUM/BRONCH W GRAM STAIN    Collection Time: 07/23/17  5:55 PM   Result Value Ref Range    Special Requests: NO SPECIAL REQUESTS      GRAM STAIN >25 WBC/lpf      GRAM STAIN <10 EPI/lpf      GRAM STAIN MUCUS PRESENT      GRAM STAIN FEW GRAM POSITIVE COCCI IN PAIRS IN GROUPS      GRAM STAIN RARE GRAM POSITIVE RODS      GRAM STAIN RARE GRAM NEGATIVE RODS      Culture result: PENDING    CULTURE, BLOOD    Collection Time: 07/23/17  5:55 PM   Result Value Ref Range    Special Requests: NO SPECIAL REQUESTS      Culture result: NO GROWTH AFTER 11 HOURS     LACTIC ACID    Collection Time: 07/23/17  6:57 PM   Result Value Ref Range    Lactic acid 1.1 0.4 - 2.0 MMOL/L   CULTURE, BLOOD    Collection Time: 07/23/17  7:15 PM   Result Value Ref Range    Special Requests: NO SPECIAL REQUESTS      Culture result: NO GROWTH AFTER 11 HOURS     GLUCOSE, POC    Collection Time: 07/24/17 12:06 AM   Result Value Ref Range    Glucose (POC) 263 (H) 70 - 110 mg/dL   CBC WITH AUTOMATED DIFF    Collection Time: 07/24/17  3:30 AM   Result Value Ref Range    WBC 7.3 4.6 - 13.2 K/uL    RBC 2.78 (L) 4.70 - 5.50 M/uL    HGB 7.6 (L) 13.0 - 16.0 g/dL    HCT 22.8 (L) 36.0 - 48.0 %    MCV 82.0 74.0 - 97.0 FL    MCH 27.3 24.0 - 34.0 PG    MCHC 33.3 31.0 - 37.0 g/dL    RDW 14.5 11.6 - 14.5 %    PLATELET 710 355 - 832 K/uL    MPV 10.5 9.2 - 11.8 FL    NEUTROPHILS 73 42 - 75 %    BAND NEUTROPHILS 15 (H) 0 - 5 %    LYMPHOCYTES 5 (L) 20 - 51 %    MONOCYTES 7 2 - 9 %    EOSINOPHILS 0 0 - 5 %    BASOPHILS 0 0 - 3 %    ABS. NEUTROPHILS 6.4 1.8 - 8.0 K/UL    ABS. LYMPHOCYTES 0.4 (L) 0.8 - 3.5 K/UL    ABS. MONOCYTES 0.5 0 - 1.0 K/UL    ABS. EOSINOPHILS 0.0 0.0 - 0.4 K/UL    ABS.  BASOPHILS 0.0 0.0 - 0.06 K/UL    DF AUTOMATED      PLATELET COMMENTS ADEQUATE PLATELETS      RBC COMMENTS ANISOCYTOSIS  1+       METABOLIC PANEL, COMPREHENSIVE    Collection Time: 07/24/17  3:30 AM   Result Value Ref Range    Sodium 141 136 - 145 mmol/L    Potassium 4.1 3.5 - 5.5 mmol/L    Chloride 111 (H) 100 - 108 mmol/L    CO2 21 21 - 32 mmol/L    Anion gap 9 3.0 - 18 mmol/L    Glucose 220 (H) 74 - 99 mg/dL    BUN 65 (H) 7.0 - 18 MG/DL    Creatinine 4.14 (H) 0.6 - 1.3 MG/DL    BUN/Creatinine ratio 16 12 - 20      GFR est AA 18 (L) >60 ml/min/1.73m2    GFR est non-AA 15 (L) >60 ml/min/1.73m2    Calcium 7.4 (L) 8.5 - 10.1 MG/DL    Bilirubin, total 0.3 0.2 - 1.0 MG/DL    ALT (SGPT) 85 (H) 16 - 61 U/L    AST (SGOT) 38 (H) 15 - 37 U/L    Alk. phosphatase 59 45 - 117 U/L    Protein, total 5.6 (L) 6.4 - 8.2 g/dL    Albumin 1.7 (L) 3.4 - 5.0 g/dL    Globulin 3.9 2.0 - 4.0 g/dL    A-G Ratio 0.4 (L) 0.8 - 1.7     PHOSPHORUS    Collection Time: 07/24/17  3:30 AM   Result Value Ref Range    Phosphorus 3.6 2.5 - 4.9 MG/DL   PROTHROMBIN TIME + INR    Collection Time: 07/24/17  3:30 AM   Result Value Ref Range    Prothrombin time 21.9 (H) 11.5 - 15.2 sec    INR 2.0 (H) 0.8 - 1.2     MAGNESIUM    Collection Time: 07/24/17  3:30 AM   Result Value Ref Range    Magnesium 2.2 1.6 - 2.6 mg/dL   POC G3    Collection Time: 07/24/17  4:51 AM   Result Value Ref Range    Device: VENT      FIO2 (POC) 70 %    pH (POC) 7.390 7.35 - 7.45      pCO2 (POC) 30.9 (L) 35.0 - 45.0 MMHG    pO2 (POC) 74 (L) 80 - 100 MMHG    HCO3 (POC) 18.7 (L) 22 - 26 MMOL/L    sO2 (POC) 95 92 - 97 %    Base deficit (POC) 6 mmol/L    Mode ASSIST CONTROL      Set Rate 14 bpm    PEEP/CPAP (POC) 8 cmH2O    PIP (POC) 13      Allens test (POC) N/A      Inspiratory Time 0.8 sec    Total resp. rate 30      Site DRAWN FROM ARTERIAL LINE      Patient temp.  98.6      Specimen type (POC) ARTERIAL      Performed by Mabeline Rast     Pressure control YES     GLUCOSE, POC    Collection Time: 07/24/17  5:42 AM   Result Value Ref Range Glucose (POC) 194 (H) 70 - 110 mg/dL   EKG, 12 LEAD, SUBSEQUENT    Collection Time: 07/24/17  7:29 AM   Result Value Ref Range    Ventricular Rate 61 BPM    Atrial Rate 61 BPM    P-R Interval 226 ms    QRS Duration 104 ms    Q-T Interval 414 ms    QTC Calculation (Bezet) 416 ms    Calculated P Axis 80 degrees    Calculated R Axis 113 degrees    Calculated T Axis -61 degrees    Diagnosis       Sinus rhythm with 1st degree AV block  Anterolateral infarct (cited on or before 21-JUL-2017)  T wave abnormality, consider inferior ischemia  Abnormal ECG  When compared with ECG of 23-JUL-2017 07:21,  Sinus rhythm has replaced Electronic atrial pacemaker     POC G3    Collection Time: 07/24/17 11:37 AM   Result Value Ref Range    Device: VENT      FIO2 (POC) 70 %    pH (POC) 7.387 7.35 - 7.45      pCO2 (POC) 33.2 (L) 35.0 - 45.0 MMHG    pO2 (POC) 121 (H) 80 - 100 MMHG    HCO3 (POC) 19.9 (L) 22 - 26 MMOL/L    sO2 (POC) 99 (H) 92 - 97 %    Base deficit (POC) 5 mmol/L    Mode ASSIST CONTROL      Set Rate 14 bpm    PEEP/CPAP (POC) 5 cmH2O    PIP (POC) 27      Allens test (POC) N/A      Inspiratory Time 1 sec    Total resp. rate 15      Site DRAWN FROM ARTERIAL LINE      Patient temp. 99.6      Specimen type (POC) ARTERIAL      Performed by Sridevi [facility]     Pressure control YES               Active Problems:    Hyperlipidemia ()      DM type 2 (diabetes mellitus, type 2) (HCC) ()      Essential hypertension, benign ()      Ventricular tachycardia (HCC) ()      Overview: Overview:       S[facility]p AICD      [facility]      Congestive heart failure (HCC) ()      Type 2 diabetes mellitus with stage 2 chronic kidney disease ([facility] (7/28/2016)      Atrial paroxysmal tachycardia ([facility] (6/7/2010)      AICD (automatic cardioverter/defibrillator) present ()      Ischemic cardiomyopathy ()      [facility] (7[facility]19[facility]2017)          Assessment:    Condition: In serious condition. Unchanged.        1. Pulm - good gas exchange with adequate oxygenation but increased resp rate and    associated resp alkalosis from underlying neuro insult (see below)   -adjust vent for comfort and allow some measure of alkalosis if patient physiology indicating     its needs in this direction    2. CV - stable hemodynamics at this time   -follow status    3. Heme - stable H/H    4. GI - no acute issues currently    5. Renal - good urine output despite acute renal failure as evidenced by the increased creatinine    over the last 48 hours resulting in metabolic acidosis; acidic pH is off set by the already present resp alkalosis   -follow labs and urine output closely    6. Neuro - no changes over the last 3 days; little doubt about probable severe anoxic brain injury    7.  Dispo - overall prognosis is quite poor; reportedly family is aware of the likely lack of meaningful recovery; family considering terminal extubation with comfort measures in the next 1-2 days    Total IC care time: 50 min

## 2017-07-24 NOTE — DIABETES MGMT
GLYCEMIC CONTROL PLAN OF CARE     Assessment/Recommendations:  Pt discussed in interdisciplinary rounds. Blood glucose slightly elevated. Pt advanced to the very insulin resistant correctional Lispro scale. Tube feeds held this morning due to high residuals. Monitor need to add basal insulin coverage. Continue inpatient monitoring and intervention    Most recent blood glucose values:  7/23/2017 06:39 7/23/2017 12:07 7/23/2017 17:01 7/24/2017 00:06 7/24/2017 05:42   181 (H) 177 (H) 219 (H) 263 (H) 194 (H)     Current A1C of 7.8% is equivalent to average blood glucose of 177 mg/dl over the past 2-3 months.     Current hospital diabetes medications:   Correctional Lispro insulin every 6 hours (very resistant scale)    Previous day's insulin requirements:   12 units of Lispro insulin     Home diabetes medications:  Glipizide 10 mg daily   NPH 25 units BID     Diet:  NPO  TF: currently held     Education:  ____Refer to Diabetes Education Record             __x__Education not indicated at this time      Jocelyn Haji RD, CDE

## 2017-07-24 NOTE — PROGRESS NOTES
[facility]  -  Progress Note      Patient: Janee Hill MRN: [id number]  SSN: [id number]    YOB: 1950  Age: 74 y.o. Sex: male      Admit Date: 7/19/2017    Hospital Problem List:     Hospital Problems  Date Reviewed: 7/20/2017          Codes Class Noted POA    Cardiac arrest [facility]) ICD-10-CM: I46.9  ICD-9-CM: 427.5  7/19/2017 Yes        Ischemic cardiomyopathy ICD-10-CM: I25.5  ICD-9-CM: 414.8  Unknown Yes        AICD (automatic cardioverter/defibrillator) present ICD-10-CM: Z95.810  ICD-9-CM: V45.02  Unknown Yes        Type 2 diabetes mellitus with stage 2 chronic kidney disease ([facility] (Chronic) ICD-10-CM: E11.22, N18.2  ICD-9-CM: 250.40, 585.2  7/28/2016 Yes        Hyperlipidemia (Chronic) ICD-10-CM: E78.5  ICD-9-CM: 272.4  Unknown Yes        DM type 2 (diabetes mellitus, type 2) (HCC) (Chronic) ICD-10-CM: E11.9  ICD-9-CM: 250.00  Unknown Yes        Essential hypertension, benign (Chronic) ICD-10-CM: I10  ICD-9-CM: 401.1  Unknown Yes        Ventricular tachycardia ([facility] (Chronic) ICD-10-CM: I47.2  ICD-9-CM: 427.1  Unknown Yes    Overview Signed 12/29/2016  7:35 PM by HERIBERTO Adamson     Overview:   S/p AICD  [facility] study             Congestive heart failure ([facility] (Chronic) ICD-10-CM: I50.9  ICD-9-CM: 428.0  Unknown Yes        Atrial paroxysmal tachycardia ([facility] ICD-10-CM: I47.1  ICD-9-CM: 427.0  6/7/2010 Yes            -Cardiac arrest, arrived unresponsive, pulseless VT, s/p shock 10 timed by AICD and external shock, currently intubated and unresponsive. Device interrogation revealed appropriate shocks for VT storm. No further events since arrest.  -Encephalopathy with seizure activity, anoxic brain injury versus possible neurologic event.  -History of VT s/p dual chamber [employer] AICD. Patient is followed by Dr. Jesusita Courtney, Dr. King Painter, Dr. Gibran Lund. Previously scheduled for VT ablation, but cancelled due to apical thrombus.  Maintained on amiodarone, mexiletine and BB. Device working appropriately on interrogation this admission.  -Elevated troponin, suspect secondary to VT with multiple shocks but known CAD as noted below. Not currently candidate for further coronary work-up.  -H/o apical thrombus, present on echo 12/2016. Maintained on warfarin, INR 2.7 on arrival. No evidence of thrombus on echo this admission.  -Acute renal failure. -Ischemic cardiomyopathy EF 30% on echo this admission unchanged from 12/2016. Not currently decompensated. -CAD s/p history of remote anterior MI, previously followed by Dr. Davide Neal. -Paroxysmal atrial fibrillation on remote AICD check.  -Hypertension. Low normal at present.  -Diabetes. On insulin. Hgb A1c 7.8.  -Dyslipidemia. On Crestor.  -Acute on chronic kidney disease.  -Elevated LFTs. -Coagulopathy with INR trending upward despite holding warfarin.      Patient is followed by Dr. Brittaney Mckeon, Dr. Kassandra Pickens, Dr. Renetta Cao. Plan:     -Continue amio gtt. -Per nephrology, patient is not a good candidate for dialysis. -Pending meaningful neurologic recovery can address need for invasive coronary eval.   -Prognosis poor.  -Encourage family to discuss goals of care. Subjective:     -Pt is intubated and not responsive. Niece at bedside.      Objective:      Patient Vitals for the past 8 hrs:   Temp Pulse Resp BP SpO2   07/24/17 1210 - 60 14 - 100 %   07/24/17 1200 99.6 °F (37.6 °C) 60 16 100/48 100 %   07/24/17 1100 - 60 22 98/47 100 %   07/24/17 1000 - 60 17 102/51 100 %   07/24/17 0900 - 60 20 92/51 100 %   07/24/17 0800 99.6 °F (37.6 °C) 60 (!) 32 105/50 100 %   07/24/17 0745 - 61 (!) 32 - 99 %   07/24/17 0734 - 60 (!) 42 - 100 %   07/24/17 0730 - 61 (!) 38 - 98 %   07/24/17 0715 - 60 (!) 37 - 100 %   07/24/17 0700 - 65 23 105/71 97 %   07/24/17 0645 - 61 (!) 31 - 100 %   07/24/17 0630 - 60 (!) 34 - 100 %   07/24/17 0615 - 60 (!) 35 - 100 %   07/24/17 0600 - 61 (!) 35 100/43 99 %   07/24/17 0545 - 61 26 - 99 %   07/24/17 0530 - 60 29 - 99 %   07/24/17 0515 - 60 20 - 99 %   07/24/17 0500 - 61 (!) 34 105/47 100 %   07/24/17 0445 - 60 (!) 36 - 100 %         Patient Vitals for the past 96 hrs:   Weight   07/24/17 0200 109 kg (240 lb 4.8 oz)   07/23/17 0000 107 kg (235 lb 12.8 oz)   07/22/17 0817 90.9 kg (200 lb 4.8 oz)         Intake/Output Summary (Last 24 hours) at 07/24/17 1240  Last data filed at 07/24/17 1200   Gross per 24 hour   Intake           3867.1 ml   Output             1255 ml   Net           2612.1 ml       Physical Exam:  General:  Intubated, sedated, not responsive  Neck:  Supple, no jvd  Lungs:  Clear to auscultation bilat  Heart:  Reg rate and rhythm, L sided pacer pocket is well healed  Abdomen:  Soft, non-tender  Extremities: no edema, atraumatic    Data Review:     Labs: Results:       Chemistry Recent Labs      07/24/17   0330 07/23/17   0439  07/22/17   0400   GLU  220*  213*  193*   NA  141  139  141   K  4.1  4.8  4.4   CL  111*  108  110*   CO2  21  20*  21   BUN  65*  53*  33*   CREA  4.14*  4.26*  2.73*   CA  7.4*  7.8*  7.6*   MG  2.2  2.1  2.1   PHOS  3.6  4.1  4.3   AGAP  9  11  10   BUCR  16  12  12   AP  59  63  72   TP  5.6*  6.1*  6.1*   ALB  1.7*  2.1*  2.4*   GLOB  3.9  4.0  3.7   AGRAT  0.4*  0.5*  0.6*      CBC w/Diff Recent Labs      07/24/17   0330 07/23/17   0439  07/22/17   0400   WBC  7.3  8.5  11.6   RBC  2.78*  3.17*  3.53*   HGB  7.6*  8.7*  9.6*   HCT  22.8*  26.1*  29.7*   PLT  164  173  192   GRANS  73  33*  80*   LYMPH  5*  9*  2*   EOS  0  0  0      Cardiac Enzymes No results found for: CPK, CKMMB, CKMB, RCK3, CKMBT, CKNDX, CKND1, LAMONT, TROPT, TROIQ, LISA, TROPT, TNIPOC, BNP, BNPP   Coagulation Recent Labs      07/24/17   0330  07/23/17   0439   PTP  21.9*  17.8*   INR  2.0*  1.5*       Lipid Panel Lab Results   Component Value Date/Time    Cholesterol, total 206 09/21/2016 01:24 PM    HDL Cholesterol 51 09/21/2016 01:24 PM    LDL, calculated 129 09/21/2016 01:24 PM    VLDL, calculated 26 09/21/2016 01:24 PM    Triglyceride 130 09/21/2016 01:24 PM    CHOL/HDL Ratio 4.0 09/21/2016 01:24 PM      BNP No results found for: BNP, BNPP, XBNPT   Liver Enzymes Recent Labs      07/24/17   0330   TP  5.6*   ALB  1.7*   AP  59   SGOT  38*      Digoxin    Thyroid Studies No results found for: T4, T3U, TSH, TSHEXT         Signed By: HERIBERTO Tran     July 24, 2017

## 2017-07-24 NOTE — PROGRESS NOTES
Hospitalist Progress Note    Patient: Tai Milligan Age: 74 y.o. : 1950 MR#: [id number] SSN: [id number]  Date/Time: 2017 3:45 PM    Subjective:     2 brothers at bedside. Recognized the severity of their brother's condition. Family is to have pray tonight and plan for compassionate extubation tomorrow. Patient has been off sedation but remains comatose, not responding  NO fever,   Creatinine is not improving, Not a candidate for dialysis. Remains on IV antibiotics        Patient was initially brought to ED by his brother because his AICD shocked him. He was having chest pain with shocks. He became unresponsive during his drive to the ED, unknown duration. On arrival to the ER, CPR was started. He was intubated   and placed on cooling protocol post cardiac arrest. He has been re-warmed but remains encephalopathic with myoclonus jerks. He troy ventilation dependent. ROS: unable due to sedated and unresponsive    Objective:   VS:   Visit Vitals    /56    Pulse 60    Temp 99.6 °F (37.6 °C)    Resp 16    Ht 6' 1\" (1.854 m)    Wt 109 kg (240 lb 4.8 oz)    SpO2 100%    BMI 31.7 kg/m2      Tmax/24hrs: Temp (24hrs), Av.4 °F (37.4 °C), Min:98.5 °F (36.9 °C), Max:101.4 °F (38.6 °C)      Intake/Output Summary (Last 24 hours) at 17 1545  Last data filed at 17 1500   Gross per 24 hour   Intake          4178.19 ml   Output             1430 ml   Net          2748.19 ml       General: Comatose, not responding off sedation.   Ventilation dependent, Family at bedside   HEENT: Pin point pupils, NC/AT, intubated, no JVD  Cardiovascular:  S1S2 regular, no rub/gallop  Pulmonary:  Air entry bilaterally  GI:  Soft, non tender, non distended   Extremities:  No pedal edema, distal pulses appreciated   Neuro: comatose with myoclonus jerks     Additional:   Current Facility-Administered Medications   Medication Dose Route Frequency    piperacillin-tazobactam (ZOSYN) 3.375 g in 0.9% sodium chloride (MBP/ADV) 100 mL MBP  3.375 g IntraVENous Q6H    VANCOMYCIN INFORMATION NOTE   Other CONTINUOUS    ELECTROLYTE REPLACEMENT PROTOCOL  1 Each Other PRN    ELECTROLYTE REPLACEMENT PROTOCOL  1 Each Other PRN    ELECTROLYTE REPLACEMENT PROTOCOL  1 Each Other PRN    acetaminophen (TYLENOL) solution 1,000 mg  1,000 mg Per NG tube Q6H PRN    LORazepam (ATIVAN) injection 2-4 mg  2-4 mg IntraVENous Q2H PRN    insulin lispro (HUMALOG) injection   SubCUTAneous Q6H    levETIRAcetam (KEPPRA) 500 mg in 100 ml IVPB  500 mg IntraVENous Q12H    propofol (DIPRIVAN) infusion  5-50 mcg/kg/min IntraVENous TITRATE    fentaNYL citrate (PF) injection  mcg   mcg IntraVENous Q4H PRN    amiodarone (NEXTERONE) 360 mg in dextrose 200 mL (1.8 mg/mL) infusion  0.5 mg/min IntraVENous TITRATE    0.9% sodium chloride infusion  50 mL/hr IntraVENous CONTINUOUS    hydroxypropyl methylcellulose (GONAK) 2.5 % ophthalmic solution 1 Drop  1 Drop Both Eyes Q12H    glucose chewable tablet 16 g  4 Tab Oral PRN    glucagon (GLUCAGEN) injection 1 mg  1 mg IntraMUSCular PRN    dextrose (D50W) injection syrg 12.5-25 g  25-50 mL IntraVENous PRN    pantoprazole (PROTONIX) 40 mg in sodium chloride 0.9 % 10 mL injection  40 mg IntraVENous DAILY          Labs:    Recent Results (from the past 24 hour(s))   POC G3    Collection Time: 07/23/17  4:26 PM   Result Value Ref Range    Device: VENT      FIO2 (POC) 0.70 %    pH (POC) 7.386 7.35 - 7.45      pCO2 (POC) 31.9 (L) 35.0 - 45.0 MMHG    pO2 (POC) 80 80 - 100 MMHG    HCO3 (POC) 19.1 (L) 22 - 26 MMOL/L    sO2 (POC) 96 92 - 97 %    Base deficit (POC) 6 mmol/L    Mode ASSIST CONTROL      Set Rate 14 bpm    PEEP/CPAP (POC) 8 cmH2O    PIP (POC) 23      Allens test (POC) N/A      Inspiratory Time 0.8 sec    Total resp.  rate 39      Site DRAWN FROM ARTERIAL LINE      Specimen type (POC) ARTERIAL      Performed by ESPERANZA Friend    Collection Time: 07/23/17  5:01 PM Result Value Ref Range    Glucose (POC) 219 (H) 70 - 110 mg/dL   CULTURE, RESPIRATORY/SPUTUM/BRONCH W GRAM STAIN    Collection Time: 07/23/17  5:55 PM   Result Value Ref Range    Special Requests: NO SPECIAL REQUESTS      GRAM STAIN >25 WBC/lpf      GRAM STAIN <10 EPI/lpf      GRAM STAIN MUCUS PRESENT      GRAM STAIN FEW GRAM POSITIVE COCCI IN PAIRS IN GROUPS      GRAM STAIN RARE GRAM POSITIVE RODS      GRAM STAIN RARE GRAM NEGATIVE RODS      Culture result: MODERATE STAPHYLOCOCCUS SPECIES (A)      Culture result: FEW GRAM NEGATIVE RODS (A)      Culture result: FEW 2ND GRAM NEGATIVE ETIENNE (A)     CULTURE, BLOOD    Collection Time: 07/23/17  5:55 PM   Result Value Ref Range    Special Requests: NO SPECIAL REQUESTS      Culture result: NO GROWTH AFTER 11 HOURS     LACTIC ACID    Collection Time: 07/23/17  6:57 PM   Result Value Ref Range    Lactic acid 1.1 0.4 - 2.0 MMOL/L   CULTURE, BLOOD    Collection Time: 07/23/17  7:15 PM   Result Value Ref Range    Special Requests: NO SPECIAL REQUESTS      Culture result: NO GROWTH AFTER 11 HOURS     GLUCOSE, POC    Collection Time: 07/24/17 12:06 AM   Result Value Ref Range    Glucose (POC) 263 (H) 70 - 110 mg/dL   CBC WITH AUTOMATED DIFF    Collection Time: 07/24/17  3:30 AM   Result Value Ref Range    WBC 7.3 4.6 - 13.2 K/uL    RBC 2.78 (L) 4.70 - 5.50 M/uL    HGB 7.6 (L) 13.0 - 16.0 g/dL    HCT 22.8 (L) 36.0 - 48.0 %    MCV 82.0 74.0 - 97.0 FL    MCH 27.3 24.0 - 34.0 PG    MCHC 33.3 31.0 - 37.0 g/dL    RDW 14.5 11.6 - 14.5 %    PLATELET 430 658 - 287 K/uL    MPV 10.5 9.2 - 11.8 FL    NEUTROPHILS 73 42 - 75 %    BAND NEUTROPHILS 15 (H) 0 - 5 %    LYMPHOCYTES 5 (L) 20 - 51 %    MONOCYTES 7 2 - 9 %    EOSINOPHILS 0 0 - 5 %    BASOPHILS 0 0 - 3 %    ABS. NEUTROPHILS 6.4 1.8 - 8.0 K/UL    ABS. LYMPHOCYTES 0.4 (L) 0.8 - 3.5 K/UL    ABS. MONOCYTES 0.5 0 - 1.0 K/UL    ABS. EOSINOPHILS 0.0 0.0 - 0.4 K/UL    ABS.  BASOPHILS 0.0 0.0 - 0.06 K/UL    DF AUTOMATED      PLATELET COMMENTS ADEQUATE PLATELETS      RBC COMMENTS ANISOCYTOSIS  1+       METABOLIC PANEL, COMPREHENSIVE    Collection Time: 07/24/17  3:30 AM   Result Value Ref Range    Sodium 141 136 - 145 mmol/L    Potassium 4.1 3.5 - 5.5 mmol/L    Chloride 111 (H) 100 - 108 mmol/L    CO2 21 21 - 32 mmol/L    Anion gap 9 3.0 - 18 mmol/L    Glucose 220 (H) 74 - 99 mg/dL    BUN 65 (H) 7.0 - 18 MG/DL    Creatinine 4.14 (H) 0.6 - 1.3 MG/DL    BUN/Creatinine ratio 16 12 - 20      GFR est AA 18 (L) >60 ml/min/1.73m2    GFR est non-AA 15 (L) >60 ml/min/1.73m2    Calcium 7.4 (L) 8.5 - 10.1 MG/DL    Bilirubin, total 0.3 0.2 - 1.0 MG/DL    ALT (SGPT) 85 (H) 16 - 61 U/L    AST (SGOT) 38 (H) 15 - 37 U/L    Alk. phosphatase 59 45 - 117 U/L    Protein, total 5.6 (L) 6.4 - 8.2 g/dL    Albumin 1.7 (L) 3.4 - 5.0 g/dL    Globulin 3.9 2.0 - 4.0 g/dL    A-G Ratio 0.4 (L) 0.8 - 1.7     PHOSPHORUS    Collection Time: 07/24/17  3:30 AM   Result Value Ref Range    Phosphorus 3.6 2.5 - 4.9 MG/DL   PROTHROMBIN TIME + INR    Collection Time: 07/24/17  3:30 AM   Result Value Ref Range    Prothrombin time 21.9 (H) 11.5 - 15.2 sec    INR 2.0 (H) 0.8 - 1.2     MAGNESIUM    Collection Time: 07/24/17  3:30 AM   Result Value Ref Range    Magnesium 2.2 1.6 - 2.6 mg/dL   POC G3    Collection Time: 07/24/17  4:51 AM   Result Value Ref Range    Device: VENT      FIO2 (POC) 70 %    pH (POC) 7.390 7.35 - 7.45      pCO2 (POC) 30.9 (L) 35.0 - 45.0 MMHG    pO2 (POC) 74 (L) 80 - 100 MMHG    HCO3 (POC) 18.7 (L) 22 - 26 MMOL/L    sO2 (POC) 95 92 - 97 %    Base deficit (POC) 6 mmol/L    Mode ASSIST CONTROL      Set Rate 14 bpm    PEEP/CPAP (POC) 8 cmH2O    PIP (POC) 13      Allens test (POC) N/A      Inspiratory Time 0.8 sec    Total resp. rate 30      Site DRAWN FROM ARTERIAL LINE      Patient temp.  98.6      Specimen type (POC) ARTERIAL      [facility]     Pressure control YES     GLUCOSE, POC    Collection Time: 07/24/17  5:42 AM   Result Value Ref Range    Glucose (POC) 194 (H) 70 - 110 mg/dL   EKG, 12 LEAD, SUBSEQUENT    Collection Time: 07/24/17  7:29 AM   Result Value Ref Range    Ventricular Rate 61 BPM    Atrial Rate 61 BPM    P-R Interval 226 ms    QRS Duration 104 ms    Q-T Interval 414 ms    QTC Calculation (Bezet) 416 ms    Calculated P Axis 80 degrees    Calculated R Axis 113 degrees    Calculated T Axis -61 degrees    Diagnosis       Sinus rhythm with 1st degree AV block  Anterolateral infarct (cited on or before 21-JUL-2017)  T wave abnormality, consider inferior ischemia  Abnormal ECG  When compared with ECG of 23-JUL-2017 07:21,  Sinus rhythm has replaced Electronic atrial pacemaker  Confirmed by Lilian Rashid MD, --- ([id number]) on 7/24/2017 12:39:27 PM     POC G3    Collection Time: 07/24/17 11:37 AM   Result Value Ref Range    Device: VENT      FIO2 (POC) 70 %    pH (POC) 7.387 7.35 - 7.45      pCO2 (POC) 33.2 (L) 35.0 - 45.0 MMHG    pO2 (POC) 121 (H) 80 - 100 MMHG    HCO3 (POC) 19.9 (L) 22 - 26 MMOL/L    sO2 (POC) 99 (H) 92 - 97 %    Base deficit (POC) 5 mmol/L    Mode ASSIST CONTROL      Set Rate 14 bpm    PEEP/CPAP (POC) 5 cmH2O    PIP (POC) 27      Allens test (POC) N/A      Inspiratory Time 1 sec    Total resp. rate 15      Site DRAWN FROM ARTERIAL LINE      Patient temp. 99.6      Specimen type (POC) ARTERIAL      Performed by Bradly espinoza YES     GLUCOSE, POC    Collection Time: 07/24/17 11:57 AM   Result Value Ref Range    Glucose (POC) 242 (H) 70 - 110 mg/dL     EEG:  ELECTROENCEPHALOGRAM INTERPRETATION: This is a  markedly abnormal recording due to the presence of marked  suppression of background activity. The patient had been on sedation,  however, so does not fit the requirement for electrocortical silence. Unvarying suppression of background activity without reactivity or poor  prognostic indicators for meaningful recovery. No epileptiform  discharges were noted. Chest Xray:  Impression:       1.  Support tubes/devices, as above.  2. Stable enlargement of the cardiac silhouette. Probable mild pulmonary  vascular congestion. 3. Increased bibasilar hazy opacities, left greater than right, possibly  atelectasis versus infiltrate/edema. Small left effusion is not excluded. 2D echocardiogram:  SUMMARY:  Procedure information: This was a technically difficult study. Intravenous  contrast (Definity) was administered. Left ventricle: Systolic function was moderately to markedly reduced by  visual assessment. Ejection fraction was estimated to be 30 %. There was  severe hypokinesis of the mid-apical anterior, basal-mid inferoseptal,  entire inferior, apical septal, apical lateral, and apical wall(s). Wall  thickness was mildly to moderately increased. Right atrium: The atrium was mildly dilated. Mitral valve: There was mild regurgitation. Aorta, systemic arteries: The root exhibited mild dilatation.     All Micro Results     Procedure Component Value Units Date/Time    CULTURE, RESPIRATORY/SPUTUM/BRONCH [place] STAIN [[id number]]  (Abnormal) Collected:  07/23/17 1755    Order Status:  Completed Specimen:  Sputum from Sputum,ET Suction Updated:  07/24/17 1204     Special Requests: NO SPECIAL REQUESTS        GRAM STAIN >25 WBC/lpf         <10 EPI/lpf         MUCUS PRESENT                 FEW GRAM POSITIVE COCCI IN PAIRS IN GROUPS      RARE GRAM POSITIVE RODS         RARE GRAM NEGATIVE RODS        Culture result:         MODERATE STAPHYLOCOCCUS SPECIES (A)              FEW GRAM NEGATIVE RODS (A)              FEW 2ND GRAM NEGATIVE ETIENNE (A)    CULTURE, RESPIRATORY/SPUTUM/BRONCH [place] STAIN [[id number]]  (Abnormal) Collected:  07/22/17 1615    Order Status:  Completed Specimen:  Sputum from Sputum,ET Suction Updated:  07/24/17 1043     Special Requests: NO SPECIAL REQUESTS        GRAM STAIN MODERATE WBC'S                 MODERATE GRAM POSITIVE COCCI IN PAIRS IN GROUPS      FEW GRAM NEGATIVE RODS        Culture result:         MANY STAPHYLOCOCCUS AUREUS (A)              MODERATE GRAM NEGATIVE RODS (A)              MODERATE 2ND GRAM NEGATIVE ETIENNE (A)              FEW NORMAL RESPIRATORY RENARD    CULTURE, URINE [[id number]] Collected:  07/22/17 1735    Order Status:  Completed Specimen:  Urine from [place] Specimen Updated:  07/24/17 0858     Special Requests: NO SPECIAL REQUESTS        Culture result: NO GROWTH 2 DAYS       CULTURE, BLOOD [[id number]] Collected:  07/23/17 1755    Order Status:  Completed Specimen:  Blood from Blood Updated:  07/24/17 0724     Special Requests: NO SPECIAL REQUESTS        Culture result: NO GROWTH AFTER 11 HOURS       CULTURE, BLOOD [[id number]] Collected:  07/23/17 1915    Order Status:  Completed Specimen:  Blood from Blood Updated:  07/24/17 0724     Special Requests: NO SPECIAL REQUESTS        Culture result: NO GROWTH AFTER 11 HOURS       CULTURE, BLOOD [[id number]] Collected:  07/22/17 1557    Order Status:  Completed Specimen:  Blood from Blood Updated:  07/24/17 0724     Special Requests: NO SPECIAL REQUESTS        Culture result: NO GROWTH 2 DAYS       CULTURE, BLOOD [[id number]] Collected:  07/22/17 1600    Order Status:  Completed Specimen:  Blood from Blood Updated:  07/24/17 0724     Special Requests: NO SPECIAL REQUESTS        Culture result: NO GROWTH 2 DAYS       CULTURE, BLOOD [[id number]] Collected:  07/19/17 1530    Order Status:  Completed Specimen:  Blood from Blood Updated:  07/24/17 0724     Special Requests: NO SPECIAL REQUESTS        Culture result: NO GROWTH 5 DAYS       CULTURE, URINE [[id number]] Collected:  07/20/17 0900    Order Status:  Completed Specimen:  [place] Specimen Updated:  07/22/17 0838     Special Requests: NO SPECIAL REQUESTS        Culture result: NO GROWTH 2 DAYS       CULTURE, BLOOD [[id number]]  (Abnormal)  (Susceptibility) Collected:  07/19/17 1530    Order Status:  Completed Specimen:  Blood from Blood Updated:  07/22/17 0708     Special Requests: Verneita Bath STAIN AEROBIC AND ANAEROBIC BOTTLES GRAM POSITIVE COCCI              SMEAR CALLED TO AND CORRECTLY REPEATED BY: TOYA RN CCU 1130 7/20 TO [facility]     Culture result:         ANAEROBIC BOTTLE STAPHYLOCOCCUS EPIDERMIDIS (A)              AEROBIC BOTTLE STAPHYLOCOCCUS LUGDUNENSIS (A)          Assessment/Plan:     1. Cardio:    A. Cardiac arrest: Pulseless VT, post AICD shock, off from hypothermia protocol, VERY POOR PROGNOSIS       - device interrogated, normal       - on Amiodarone drip    B.  CAD with recent troponin elevation, secondary to VT and shocks    C. History of VT with AICD, to schedule for VT ablation but cancelled due to apical thrombus    D. Ischemic cardiomyopathy, EF 30%, proBNP not elevated on admission        - recheck proBNP today. Might need fluid as he looks dry    E.  Paroxysmal atrial fibrillation, in sinus rhythm     2. Resp:     A. Ventilation dependent hypoxic respiratory failure, remains intubated and unable to wean off ventilation        - Vent cont, management per ICU protocol        - FAMILY IS CONSIDERING COMPASSIONATE EXTUBATION TOMORROW      B. PNEUMONIA, RESPIRATORY SPUTUM WITH STAPH AND GRAM NEG ETIENNE        - ON VANCOMYCIN AND ZOSYN FOR NOW     3. Neuro:     A.  COMATOSE DUE TO Anoxic brain injury,         - POOR PROGNOSIS, OFF SEDATION WITH NO RESPONSE        - FAMILY TO HAVE LAST PRAY TONIGHT AND WILL ATTEMPT AT COMPASSIONATE EXTUBATION TOMORROW      B. Persistent myoclonus, ?seizure, CONTINUE ON KEPPRA, NO NEED FOR FURTHER EEG TOMORROW      4. :     A. NUSRAT on CKD III, suspected from hypoperfusion      - NOT A CANDIDATE FOR HEMODIALYSIS     5.  GI:     A. Liver shock wit transaminitis, improving. B.  On GI feed    6. ID:     A. Positive blood culture, with Staph, ?contamination        - ON VANCO/ZOSYN     B. Urine with gram negative rods and gram positive cocci       - ON VANCO/ZOSYN    7. Endo:     A.  DMT2. On ISS per ICU protocol    8.   Heme/Onc:      A. Hypercoagulable state, improved.   Off coumadin       Additional Notes:    Time spent 40 minutes    Case discussed with:  []Patient  [x]Family  [x]Nursing  [x]Case Management  DVT Prophylaxis:  []Lovenox  []Hep SQ  [x]SCDs  []Coumadin   []On Heparin gtt    Signed By: Magalys Dietrich MD     July 24, 2017 3:45 PM

## 2017-07-24 NOTE — PROGRESS NOTES
RENAL DAILY PROGRESS NOTE    Impression & Plan:   IMPRESSION:   · Acute kidney injury , suspect ATN for hypotension, sepsis , worsening of renal failure   · Metabolic acidosis , stable   · Respiratory failure, intubated,   · S/p cardiac arrest  · Anoxic brain injury, severe per neurology colleague   PLAN:   ·  Mr. Veronica Moreno has MODS, severe brain injury , severe cardiomyopathy , at present respi failure requiring intubation. Base on current situation , I doubt there will be meaningful recovery from renal stand point and in general.    · Again , He has no urgent indication for dialysis today , and he is not good candidate for dialysis in my opinion. His blood pressure remain low, high risk for cardiac arrythmia during HD. · Will follow very closely. · Continue supportive care per ICU colleague. · Adjust all meds per current renal function status. Subjective:     72y M with NUSRAT  Complaint:   Overnight events noted  Remain on ICU, no improvement in mental status or hemodynamics.     Current Facility-Administered Medications   Medication Dose Route Frequency    piperacillin-tazobactam (ZOSYN) 3.375 g in 0.9% sodium chloride (MBP/ADV) 100 mL MBP  3.375 g IntraVENous Q6H    VANCOMYCIN INFORMATION NOTE   Other CONTINUOUS    ELECTROLYTE REPLACEMENT PROTOCOL  1 Each Other PRN    ELECTROLYTE REPLACEMENT PROTOCOL  1 Each Other PRN    ELECTROLYTE REPLACEMENT PROTOCOL  1 Each Other PRN    acetaminophen (TYLENOL) solution 1,000 mg  1,000 mg Per NG tube Q6H PRN    LORazepam (ATIVAN) injection 2-4 mg  2-4 mg IntraVENous Q2H PRN    insulin lispro (HUMALOG) injection   SubCUTAneous Q6H    levETIRAcetam (KEPPRA) 500 mg in 100 ml IVPB  500 mg IntraVENous Q12H    propofol (DIPRIVAN) infusion  5-50 mcg/kg/min IntraVENous TITRATE    fentaNYL citrate (PF) injection  mcg   mcg IntraVENous Q4H PRN    amiodarone (NEXTERONE) 360 mg in dextrose 200 mL (1.8 mg/mL) infusion  0.5 mg/min IntraVENous TITRATE    0.9% sodium chloride infusion  50 mL/hr IntraVENous CONTINUOUS    hydroxypropyl methylcellulose (GONAK) 2.5 % ophthalmic solution 1 Drop  1 Drop Both Eyes Q12H    glucose chewable tablet 16 g  4 Tab Oral PRN    glucagon (GLUCAGEN) injection 1 mg  1 mg IntraMUSCular PRN    dextrose (D50W) injection syrg 12.5-25 g  25-50 mL IntraVENous PRN    pantoprazole (PROTONIX) 40 mg in sodium chloride 0.9 % 10 mL injection  40 mg IntraVENous DAILY       Review of Symptoms:  As above   Objective:   Patient Vitals for the past 24 hrs:   Temp Pulse Resp BP SpO2   07/24/17 1200 99.6 °F (37.6 °C) - - - -   07/24/17 1000 - 60 17 102/51 100 %   07/24/17 0900 - 60 20 92/51 100 %   07/24/17 0800 99.6 °F (37.6 °C) 60 (!) 32 105/50 100 %   07/24/17 0745 - 61 (!) 32 - 99 %   07/24/17 0734 - 60 (!) 42 - 100 %   07/24/17 0730 - 61 (!) 38 - 98 %   07/24/17 0715 - 60 (!) 37 - 100 %   07/24/17 0700 - 65 23 105/71 97 %   07/24/17 0645 - 61 (!) 31 - 100 %   07/24/17 0630 - 60 (!) 34 - 100 %   07/24/17 0615 - 60 (!) 35 - 100 %   07/24/17 0600 - 61 (!) 35 100/43 99 %   07/24/17 0545 - 61 26 - 99 %   07/24/17 0530 - 60 29 - 99 %   07/24/17 0515 - 60 20 - 99 %   07/24/17 0500 - 61 (!) 34 105/47 100 %   07/24/17 0445 - 60 (!) 36 - 100 %   07/24/17 0430 - 60 (!) 31 - 99 %   07/24/17 0415 - 61 (!) 35 - 100 %   07/24/17 0400 98.5 °F (36.9 °C) 60 (!) 31 105/42 100 %   07/24/17 0345 - 63 (!) 33 - 100 %   07/24/17 0343 - 62 30 - 100 %   07/24/17 0330 - 61 (!) 36 - 99 %   07/24/17 0315 - 61 28 - 99 %   07/24/17 0300 - 60 (!) 34 106/44 99 %   07/24/17 0245 - 61 (!) 35 - 99 %   07/24/17 0230 - 62 (!) 34 - 99 %   07/24/17 0215 - 61 (!) 34 - 99 %   07/24/17 0200 - 60 21 103/51 99 %   07/24/17 0145 - 64 (!) 36 - 99 %   07/24/17 0130 - 65 (!) 36 - 99 %   07/24/17 0115 - 67 (!) 35 - 99 %   07/24/17 0100 - 68 (!) 35 108/46 98 %   07/24/17 0045 - 67 (!) 33 - 98 %   07/24/17 0030 - 67 (!) 38 - 98 %   07/24/17 0015 - 70 (!) 32 - 99 % 07/24/17 0000 98.8 °F (37.1 °C) 63 28 106/45 99 %   07/23/17 2345 - 66 (!) 33 - 99 %   07/23/17 2330 - 65 (!) 31 - 99 %   07/23/17 2329 - 65 26 - 100 %   07/23/17 2100 - 61 28 98/52 100 %   07/23/17 2045 - 61 29 - 100 %   07/23/17 2030 - 62 30 - 100 %   07/23/17 2015 - 62 (!) 32 - 100 %   07/23/17 2000 98.6 °F (37 °C) 61 (!) 32 101/53 100 %   07/23/17 1945 - 62 (!) 35 - 100 %   07/23/17 1930 - 61 29 - 100 %   07/23/17 1926 - 60 26 - 100 %   07/23/17 1915 - 60 (!) 36 - 100 %   07/23/17 1800 - 69 30 100/65 100 %   07/23/17 1700 (!) 101.4 °F (38.6 °C) 68 (!) 33 119/80 99 %   07/23/17 1624 - 61 (!) 36 - 100 %   07/23/17 1600 - 62 29 105/46 100 %   07/23/17 1500 - 60 26 105/75 99 %   07/23/17 1400 - 60 28 99/50 100 %   07/23/17 1303 - 60 (!) 37 - 100 %   07/23/17 1300 - 62 24 121/49 100 %   07/23/17 1209 99 °F (37.2 °C) - - - -        Weight change: 18.1 kg (40 lb)     07/22 1901 - 07/24 0700  In: 6179 [I.V.:3139]  Out: 560 [Urine:560]    Intake/Output Summary (Last 24 hours) at 07/24/17 1205  Last data filed at 07/24/17 1200   Gross per 24 hour   Intake           3867.1 ml   Output             1255 ml   Net           2612.1 ml     Physical Exam:   General: intubated, tachypnea    HEENT sclera anicteric,   CVS: S1S2 heard,  no rub  RS: + air entry b/l,   Abd: Soft, Non tender,  Neuro: on sedation   Extrm: + edema, no cyanosis, clubbing   Skin: no visible  Rash  Musculoskeletal: No gross joints or bone deformities       Data Review:     LABS:   Hematology: Recent Labs      07/24/17   0330  07/23/17   0439  07/22/17   0400   WBC  7.3  8.5  11.6   HGB  7.6*  8.7*  9.6*   HCT  22.8*  26.1*  29.7*     Chemistry: Recent Labs      07/24/17   0330  07/23/17   0439  07/22/17   0400   BUN  65*  53*  33*   CREA  4.14*  4.26*  2.73*   CA  7.4*  7.8*  7.6*   ALB  1.7*  2.1*  2.4*   K  4.1  4.8  4.4   NA  141  139  141   CL  111*  108  110*   CO2  21  20*  21   PHOS  3.6  4.1  4.3   GLU  220*  213*  193* Procedures/imaging: see electronic medical records for all procedures, Xrays and details which were not copied into this note but were reviewed prior to creation of Plan          Assessment & Plan:             Carlos Chang MD  7/24/2017  12:05 PM

## 2017-07-24 NOTE — PROGRESS NOTES
attended the interdisciplinary rounds for Nemesio Moss, who is a 74 y. o.,male. Patients Primary Language is: [place]. According to the patients EMR Hindu Affiliation is: Barbi Cain.     The reason the Patient came to the hospital is:   Patient Active Problem List    Diagnosis Date Noted    Cardiac arrest [facility]) 07/19/2017    Ischemic cardiomyopathy     MI (myocardial infarction) ([address]     Apical mural thrombus     CKD (chronic kidney disease) stage 3, GFR 30-59 ml/min     Diabetes ([address]     Systolic heart failure ([address]     AICD (automatic cardioverter/defibrillator) present     Type 2 diabetes mellitus with stage 2 chronic kidney disease ([address] 07/28/2016    SBO (small bowel obstruction) ([address] 01/21/2016    Type 2 diabetes mellitus without complication ([address] 67/88/1544    Chronic kidney disease, stage III (moderate) 08/14/2015    Hyperlipidemia     DM type 2 (diabetes mellitus, type 2) ([address]     Essential hypertension, benign     Ventricular tachycardia (HCC)     Congestive heart failure (HCC)     Other and unspecified angina pectoris     Automatic implantable cardioverter-defibrillator in situ 04/22/2014    Generalized ischemic myocardial dysfunction 06/07/2010    Hypertension 06/07/2010    Diabetes mellitus type 1A ([address] 06/07/2010    Atrial paroxysmal tachycardia ([address] 06/07/2010      Not able to assess the patient due to medical condition. No family at bedside. Plan:  Chaplains will continue to follow and will provide pastoral care on an as needed/requested basis.  recommends bedside caregivers page  on duty if patient shows signs of acute spiritual or emotional distress.     [address] [employer]  Board Certified [address] [facility]   [phone]

## 2017-07-24 NOTE — ROUTINE PROCESS
Deyvi Blanco with [address] [place] [address] called the unit to receive update on patient's overall status, neuro exam, and family's plans for probable compassionate extubation tomorrow, 7-25-17. Will continue to follow along.

## 2017-07-24 NOTE — INTERDISCIPLINARY ROUNDS
CRITICAL CARE INTERDISCIPLINARY ROUNDS      Patient Information:    Name:   Chanda Grigsby    Age:   74 y.o.     Admission Date:   7/19/2017    Readmit Risk Assessment Information:      Readmit Risk Tool Support Systems: Friends \ neighbors, Relationship with Primary Physician Group: Seen at least one time within the past 6 months    Surgery Date:      Day of Stay:     Expected Discharge Date:        Attending Provider:   Miguelito Worrell MD    Surgeon:        Consultant:       Primary Care Provider:   Geovany Estrada NP    Problem List:     Patient Active Problem List   Diagnosis Code    Hyperlipidemia E78.5    DM type 2 (diabetes mellitus, type 2) ([address] E11.9    Essential hypertension, benign I10    Ventricular tachycardia ([address] I47.2    Congestive heart failure ([address] I50.9    Other and unspecified angina pectoris I20.9    Chronic kidney disease, stage III (moderate) N18.3    Type 2 diabetes mellitus without complication ([address] A76.2    SBO (small bowel obstruction) ([address] K56.69    Type 2 diabetes mellitus with stage 2 chronic kidney disease ([address] E11.22, N18.2    Generalized ischemic myocardial dysfunction I25.5    Automatic implantable cardioverter-defibrillator in situ Z95.810    Hypertension I10    Diabetes mellitus type 1A ([address] E10.9    Atrial paroxysmal tachycardia (HCC) I47.1    CKD (chronic kidney disease) stage 3, GFR 30-59 ml/min N18.3    Diabetes ([address] J97.5    Systolic heart failure ([address] I50.20    AICD (automatic cardioverter/defibrillator) present Z95.810    Ischemic cardiomyopathy I25.5    MI (myocardial infarction) ([address] I21.3    Apical mural thrombus [id number]    Cardiac arrest ([address] I46.9       Principal Problem:  <principal problem not specified>    Procedure:       During rounds the following quality care indicators and evidence based practices were addressed :     DVT Prophylaxis, Pressure Injury Prevention, Pain Management, Sepsis resuscitation and management, Nutritional Status, Critical Care Interventions Airways, Drains and Lines and IHI Bundles: Central Line Bundle Followed , Santoyo Bundle Followed and Vent Bundle Followed, Vent Day 5           Acute MI/PCI:   Not applicable    Heart Failure:    Central Line Bundle Followed , Santoyo Bundle Followed and Vent Bundle Followed, Vent Day 5    Cardiac Surgery:  Not applicable    SCIP Measures for other Surgeries:   Not applicable    Pneumonia:    Appropriate Antibiotic Selection (ICU versus Non-ICU)    Stroke:  Patient's Personal Risk Factors for Stroke are:   hypertension, family history, hyperlipidemia or diabetes mellitus    NIH Stroke Score       Transfer Level of Care:  Not Ready for Transfer    The patient will require the following interventions based on the Readmission Risk Assessment:  Pharmacy evaluation and teaching, Care Management involvement for home health follow up for:  mobility and assistance with ADL's and Palliative Care evaluation      Discharge Management:  Palliative Care    Anticipated Discharge Date:  3      Interdisciplinary team rounds were held  with the following team membersCare Management, Diabetes Treatment Specialist, Nursing, Nutrition, Pastoral Care, Pharmacy, Physician and Clinical Coordinator and the  patient and sibling(s). Plan of care discussed. See clinical pathway and/or care plan for interventions and desired outcomes. Severe anoxic injury , mono clonic jerking better decreasing diprivan. Family discussing compassionate extubation.

## 2017-07-24 NOTE — ROUTINE PROCESS
Bedside and Verbal shift change report given to ROMÁN Bateman (oncoming nurse) by Rachell Lopez RN (offgoing nurse). Report included the following information SBAR, Kardex, MAR and Recent Results.     SITUATION:    Code Status: Full Code   Reason for Admission: Cardiac arrest [facility])    [facility] day: 5   Problem List:       Hospital Problems  Date Reviewed: 7/20/2017          Codes Class Noted POA    Cardiac arrest [facility]) ICD-10-CM: I46.9  ICD-9-CM: 427.5  7/19/2017 Yes        Ischemic cardiomyopathy ICD-10-CM: I25.5  ICD-9-CM: 414.8  Unknown Yes        AICD (automatic cardioverter/defibrillator) present ICD-10-CM: Z95.810  ICD-9-CM: V45.02  Unknown Yes        Type 2 diabetes mellitus with stage 2 chronic kidney disease ([facility] (Chronic) ICD-10-CM: E11.22, N18.2  ICD-9-CM: 250.40, 585.2  7/28/2016 Yes        Hyperlipidemia (Chronic) ICD-10-CM: E78.5  ICD-9-CM: 272.4  Unknown Yes        DM type 2 (diabetes mellitus, type 2) ([facility] (Chronic) ICD-10-CM: E11.9  ICD-9-CM: 250.00  Unknown Yes        Essential hypertension, benign (Chronic) ICD-10-CM: I10  ICD-9-CM: 401.1  Unknown Yes        Ventricular tachycardia ([facility] (Chronic) ICD-10-CM: I47.2  ICD-9-CM: 427.1  Unknown Yes    Overview Signed 12/29/2016  7:35 PM by HERIBERTO Boyce     Overview:   S/p AICD  [facility] study             Congestive heart failure (HCC) (Chronic) ICD-10-CM: I50.9  ICD-9-CM: 428.0  Unknown Yes        Atrial paroxysmal tachycardia ([facility] ICD-10-CM: I47.1  ICD-9-CM: 427.0  6/7/2010 Yes              BACKGROUND:    Past Medical History:   Past Medical History:   Diagnosis Date    AICD (automatic cardioverter/defibrillator) present     Aneurysm of left ventricle of heart     with vt    Apical mural thrombus (HCC)     CKD (chronic kidney disease) stage 3, GFR 30-59 ml/min     Diabetes ([facility]     IDDM    Essential hypertension, benign     Ischemic cardiomyopathy     MI (myocardial infarction) ([facility]     Mixed hyperlipidemia     Other and unspecified angina pectoris (HCC)     Paroxysmal ventricular tachycardia (HCC)     [facility]BO (small [facility] obstruction) [facility]     Systolic heart failure ([facility]          Patient taking anticoagulants: No     ASSESSMENT:    Changes in Assessment Throughout Shift: Versed and Propofol drips stopped. Myoclonus continues with stimulation. 560 ml gastric residual found upon initial morning assessment. Tube feeding stopped for 4 hr, then resumed at trickle rates. Brother, Kayla Little, reported that family is most likely planning for compassionate extubation tomorrow, 7-25-17.      Patient has Central Line: No      Patient has Santoyo Cath: Yes - Reasons if yes: Strict intake & output      Last Vitals:     Vitals:    07/24/17 1600 07/24/17 1700 07/24/17 1800 07/24/17 1900   BP: (!) 79/59 107/53 113/56 106/68   Pulse: 62 61 60 66   Resp: 20 19 18 22   Temp: 99.2 °F (37.3 °C)      SpO2: 100% 100% 100% 99%   Weight:       Height:            IV and DRAINS (will only show if present)   [REMOVED] Peripheral IV 07/19/17 Left;Upper Arm-Site Assessment: Clean, dry, & intact  Arterial Line 07/19/17 Right Radial artery-Site Assessment: Clean, dry, & intact  [REMOVED] Triple Lumen 07/19/17 Right Femoral-Site Assessment: Clean, dry, & intact  Peripheral IV 07/19/17 Right Antecubital-Site Assessment: Clean, dry, & intact  [REMOVED] Peripheral IV 07/19/17 Right;Upper Arm-Site Assessment: Clean, dry, & intact  Orogastric Tube 07/19/17-Site Assessment: Clean, dry, & intact  Airway - Continuous Aspiration of Subglottic Secretions (FEDERICO) Tube 07/19/17 Oral-Site Assessment: Clean, dry, & intact  Peripheral IV 07/21/17 Left Hand-Site Assessment: Clean, dry, & intact     WOUND (if present)   Wound Type:  none   Dressing present Dressing Present : Yes, Intact, not due to be changed   Wound Concerns/Notes:  none     PAIN    Pain Assessment                   Patient Stated Pain Goal: Unable to verbalize/indicatate pain  o Interventions for Pain: none  o Intervention effective: N/A  o Time of last intervention: N/A   o Reassessment Completed: yes      Last 3 Weights:  Last 3 Recorded Weights in this Encounter    07/22/17 0817 07/23/17 0000 07/24/17 0200   Weight: 90.9 kg (200 lb 4.8 oz) 107 kg (235 lb 12.8 oz) 109 kg (240 lb 4.8 oz)     Weight change: 18.1 kg (40 lb)     INTAKE/OUPUT    Current Shift:      Last three shifts: 07/23 0701 - 07/24 1900  In: 5805 [I.V.:3585]  Out: 1875 [Urine:1375]     LAB RESULTS     Recent Labs      07/24/17   0330  07/23/17   0439  07/22/17   0400   WBC  7.3  8.5  11.6   HGB  7.6*  8.7*  9.6*   HCT  22.8*  26.1*  29.7*   PLT  164  173  192        Recent Labs      07/24/17   0330  07/23/17   0439  07/22/17   0400   NA  141  139  141   K  4.1  4.8  4.4   GLU  220*  213*  193*   BUN  65*  53*  33*   CREA  4.14*  4.26*  2.73*   CA  7.4*  7.8*  7.6*   MG  2.2  2.1  2.1   INR  2.0*  1.5*  1.5*       RECOMMENDATIONS AND DISCHARGE PLANNING     1. Pending tests/procedures/ Plan of Care or Other Needs: EEG tomorrow, 7-25-17; Possible compassionate extubation tomorrow, 7-25-17; Aspiration precautions     2. Discharge plan for patient and Needs/Barriers: TBD    3. Estimated Discharge Date: TBD; Posted on Whiteboard in Patients Room: Yes      4. The patient's care plan was reviewed with the oncoming nurse. \"HEALS\" SAFETY CHECK      Fall Risk    Total Score: 2    Safety Measures: Safety Measures: Bed/Chair-Wheels locked, Bed in low position, Call light within reach, Emergency bedside equipment, Fall prevention (comment), Gripper socks, Nurse at bedside, Side rails X 3    A safety check occurred in the patient's room between off going nurse and oncoming nurse listed above.     The safety check included the below items  Area Items   H  High Alert Medications - Verify all high alert medication drips (heparin, PCA, etc.)   E  Equipment - Suction is set up for ALL patients (with ok)  - Red plugs utilized for all equipment (IV pumps, etc.)  - WOWs wiped down at end of shift.  - Room stocked with oxygen, suction, and other unit-specific supplies   A  Alarms - Bed alarm is set for fall risk patients  - Ensure chair alarm is in place and activated if patient is up in a chair   L  Lines - Check IV for any infiltration  - Santoyo bag is empty if patient has a Santoyo   - Tubing and IV bags are labeled   S  Safety   - Room is clean, patient is clean, and equipment is clean. - Hallways are clear from equipment besides carts. - Fall bracelet on for fall risk patients  - Ensure room is clear and free of clutter  - Suction is set up for ALL patients (with radhaker)  - Hallways are clear from equipment besides carts.    - Isolation precautions followed, supplies available outside room, sign posted     Yvette Mg RN

## 2017-07-25 NOTE — PROGRESS NOTES
Re:  Cara Guerrero,Follow up visit     7/25/2017 1:45 PM    SSN: [id number]    Subjective:   Abdirashid Gupta is seen in follow up. Both Propofol, and Fentanyl sedation have been stopped. He's had some myoclonic twitching according to the staff, but only minimal compared to earlier in his course. He seems to have increased myoclonus with any significant stimulation. Reported to have opened his eyes slightly and grimaced to pain.         Medications:    Current Facility-Administered Medications   Medication Dose Route Frequency Provider Last Rate Last Dose    piperacillin-tazobactam (ZOSYN) 3.375 g in 0.9% sodium chloride (MBP/ADV) 100 mL MBP  3.375 g IntraVENous Q6H Bailey Epps  mL/hr at 07/25/17 1020 3.375 g at 07/25/17 1020    ELECTROLYTE REPLACEMENT PROTOCOL  1 Each Other PRN Shelley A Day, PA        ELECTROLYTE REPLACEMENT PROTOCOL  1 Each Other PRN Shelley A Day, PA        ELECTROLYTE REPLACEMENT PROTOCOL  1 Each Other PRN Shelley A Day, PA        acetaminophen (TYLENOL) solution 1,000 mg  1,000 mg Per NG tube Q6H PRN Shelley A Day, PA   1,000 mg at 07/25/17 0056    LORazepam (ATIVAN) injection 2-4 mg  2-4 mg IntraVENous Q2H PRN Reedstone Noonan NP   4 mg at 07/25/17 0536    insulin lispro (HUMALOG) injection   SubCUTAneous Q6H Govind Dolan MD   Stopped at 07/25/17 1212    levETIRAcetam (KEPPRA) 500 mg in 100 ml IVPB  500 mg IntraVENous Q12H Reedstone Noonan  mL/hr at 07/25/17 1339 500 mg at 07/25/17 1339    propofol (DIPRIVAN) infusion  5-50 mcg/kg/min IntraVENous TITRATE January-Tamy DALTON PA-C   Stopped at 07/24/17 1023    fentaNYL citrate (PF) injection  mcg   mcg IntraVENous Q4H PRN Yamilastone Noonan NP   50 mcg at 07/25/17 0513    amiodarone (NEXTERONE) 360 mg in dextrose 200 mL (1.8 mg/mL) infusion  0.5 mg/min IntraVENous TITRATE Chandana Orta MD 16.7 mL/hr at 07/25/17 1339 0.5 mg/min at 07/25/17 1339    0.9% sodium chloride infusion  50 mL/hr IntraVENous Joaquim Gibbons MD 50 mL/hr at 07/25/17 1234 50 mL/hr at 07/25/17 1234    hydroxypropyl methylcellulose (GONAK) 2.5 % ophthalmic solution 1 Drop  1 Drop Both Eyes Q12H Clara Mary MD   1 Drop at 07/25/17 0200    glucose chewable tablet 16 g  4 Tab Oral PRN Clara Mary MD        glucagon (GLUCAGEN) injection 1 mg  1 mg IntraMUSCular PRN Clara Mary MD        dextrose (D50W) injection syrg 12.5-25 g  25-50 mL IntraVENous PRN Clara Mary MD   12.5 g at 07/20/17 1314    pantoprazole (PROTONIX) 40 mg in sodium chloride 0.9 % 10 mL injection  40 mg IntraVENous DAILY Clara Mary MD   40 mg at 07/25/17 0824       Vital signs:    Visit Vitals    /55    Pulse 70    Temp 99.4 °F (37.4 °C)    Resp 22    Ht 6' 1\" (1.854 m)    Wt 104.1 kg (229 lb 8 oz)    SpO2 99%    BMI 30.28 kg/m2       Review of Systems:   Could not be obtained from the patient because of the medical status.       Patient Active Problem List   Diagnosis Code    Hyperlipidemia E78.5    DM type 2 (diabetes mellitus, type 2) ([facility] E11.9    Essential hypertension, benign I10    Ventricular tachycardia (HCC) I47.2    Congestive heart failure (HCC) I50.9    Other and unspecified angina pectoris I20.9    Chronic kidney disease, stage III (moderate) N18.3    Type 2 diabetes mellitus without complication (HCC) H79.3    SBO (small bowel obstruction) (HCC) K56.69    Type 2 diabetes mellitus with stage 2 chronic kidney disease (HCC) E11.22, N18.2    Generalized ischemic myocardial dysfunction I25.5    Automatic implantable cardioverter-defibrillator in situ Z95.810    Hypertension I10    Diabetes mellitus type 1A ([facility] E10.9    Atrial paroxysmal tachycardia (HCC) I47.1    CKD (chronic kidney disease) stage 3, GFR 30-59 ml/min N18.3    Diabetes (HCC) M61.1    Systolic heart failure (HCC) I50.20    AICD (automatic cardioverter/defibrillator) present Z95.810    Ischemic cardiomyopathy I25.5    [facility] ([facility] infarction) [facility] I21.3    Apical mural thrombus [id number]    Cardiac arrest ([facility] I46.9         Objective: The patient is deeply comatose, on the ventilator. He's noticed to have some spontaneous twitching of the face, and body at this time, which clearly increased with external stimulation. Cranial Nerves: II  Visual fields are not testable. Both pupils are 2 mm and sluggishly reactive to light. III, IV, VI  Extraocular movements are intact to doll's eye's manuver. There is no nystagmus. V  corneal responses are present. VII  Face is symmetrical.  VIII - Hearing is not testable. IX, X, XII  Palate is symmetrical.   Motor: The patient has increased myoclonus to pain, but no withdrawal. Tone is normal. Reflexes are 2+ and symmetrical. Plantars are up going. Gait is could not be tested. CBC:   Lab Results   Component Value Date/Time    WBC 8.2 07/25/2017 04:00 AM    RBC 3.03 07/25/2017 04:00 AM    HGB 8.0 07/25/2017 04:00 AM    HCT 24.8 07/25/2017 04:00 AM    PLATELET 301 89/81/6363 04:00 AM     BMP:   Lab Results   Component Value Date/Time    Glucose 244 07/25/2017 04:00 AM    Sodium 143 07/25/2017 04:00 AM    Potassium 4.4 07/25/2017 04:00 AM    Chloride 113 07/25/2017 04:00 AM    CO2 22 07/25/2017 04:00 AM    BUN 67 07/25/2017 04:00 AM    Creatinine 4.13 07/25/2017 04:00 AM    Calcium 7.9 07/25/2017 04:00 AM     CMP:   Lab Results   Component Value Date/Time    Glucose 244 07/25/2017 04:00 AM    Sodium 143 07/25/2017 04:00 AM    Potassium 4.4 07/25/2017 04:00 AM    Chloride 113 07/25/2017 04:00 AM    CO2 22 07/25/2017 04:00 AM    BUN 67 07/25/2017 04:00 AM    Creatinine 4.13 07/25/2017 04:00 AM    Calcium 7.9 07/25/2017 04:00 AM    Anion gap 8 07/25/2017 04:00 AM    BUN/Creatinine ratio 16 07/25/2017 04:00 AM    Alk.  phosphatase 68 07/25/2017 04:00 AM    Protein, total 5.9 07/25/2017 04:00 AM    Albumin 1.7 07/25/2017 04:00 AM    Globulin 4.2 07/25/2017 04:00 AM    A-G Ratio 0.4 07/25/2017 04:00 AM     Coagulation:   Lab Results   Component Value Date/Time    Prothrombin time 21.6 07/25/2017 04:00 AM    INR 2.0 07/25/2017 04:00 AM    aPTT 79.0 07/21/2017 06:20 AM     Cardiac markers:   Lab Results   Component Value Date/Time     07/20/2017 03:59 AM    CK-MB Index 5.9 07/20/2017 03:59 AM     EEG no seizure activity, just some myoclonic activity. He has generalized slowing on the EEG otherwise. Assessment:  Now day 6 post code encephalopathy with post arrest myoclonus in this patient who has risk factors including cardiac arrest.    Plan:  Now off sedation he continues to have a slight bit of myoclonus, but less apparently than earlier ion his course. Poor prognosis with his continued myoclonus. Continue Keppra. Will follow. Sincerely,        Juan Mejia.  Arturo Alfonso M.D.

## 2017-07-25 NOTE — PROGRESS NOTES
Hospitalist Progress Note    Patient: Ehsan Jimenez Age: 74 y.o. : 1950 MR#: [id number] SSN: [id number]  Date/Time: 2017 9:24 AM    Subjective: \"He opens his eyes when you call his name. \" Patient's niece and nephew at bedside. Patient has been off sedation and has been unresponsive with ventilation dependent. He remains febrile last night. He continues to have myoclonus jerks. Continues on antibiotics, Cr increased but not a candidate for HD. He opened his eyes after calling his name and sternal rub. He had no purposeful tracking or follows direction. He resumed with myoclonus jerking. EEG plan for today.      ROS: Unable to response, unresponsive     Objective:   VS:   Visit Vitals    /52 (BP 1 Location: Right arm)    Pulse 71    Temp 99.5 °F (37.5 °C)    Resp 29    Ht 6' 1\" (1.854 m)    Wt 104.1 kg (229 lb 8 oz)    SpO2 98%    BMI 30.28 kg/m2      Tmax/24hrs: Temp (24hrs), Av.8 °F (37.7 °C), Min:99.2 °F (37.3 °C), Max:101.1 °F (38.4 °C)      Intake/Output Summary (Last 24 hours) at 17 9257  Last data filed at 17 0900   Gross per 24 hour   Intake          2716.88 ml   Output              890 ml   Net          1826.88 ml       General: Unresponsive but able to open his eyes with sternal rub and calling his name, Family at bedside  HEENT: Pin point pupil and not responding to light, flicking eyes after after, NT and trach tube in tact  Cardiovascular:  S1S2 regular, no rub/gallop  Pulmonary:  Air entry bilaterally  GI:  Soft, non tender, non distended   Extremities:  Mild pedal edema, distal pulses appreciated   Neuro: unresponsive with open eyes on sternal rub, no purposeful movement or tracking    Additional:   Current Facility-Administered Medications   Medication Dose Route Frequency    propofol (DIPRIVAN) 10 mg/mL injection        piperacillin-tazobactam (ZOSYN) 3.375 g in 0.9% sodium chloride (MBP/ADV) 100 mL MBP  3.375 g IntraVENous Q6H    VANCOMYCIN INFORMATION NOTE   Other CONTINUOUS    ELECTROLYTE REPLACEMENT PROTOCOL  1 Each Other PRN    ELECTROLYTE REPLACEMENT PROTOCOL  1 Each Other PRN    ELECTROLYTE REPLACEMENT PROTOCOL  1 Each Other PRN    acetaminophen (TYLENOL) solution 1,000 mg  1,000 mg Per NG tube Q6H PRN    LORazepam (ATIVAN) injection 2-4 mg  2-4 mg IntraVENous Q2H PRN    insulin lispro (HUMALOG) injection   SubCUTAneous Q6H    levETIRAcetam (KEPPRA) 500 mg in 100 ml IVPB  500 mg IntraVENous Q12H    propofol (DIPRIVAN) infusion  5-50 mcg/kg/min IntraVENous TITRATE    fentaNYL citrate (PF) injection  mcg   mcg IntraVENous Q4H PRN    amiodarone (NEXTERONE) 360 mg in dextrose 200 mL (1.8 mg/mL) infusion  0.5 mg/min IntraVENous TITRATE    0.9% sodium chloride infusion  50 mL/hr IntraVENous CONTINUOUS    hydroxypropyl methylcellulose (GONAK) 2.5 % ophthalmic solution 1 Drop  1 Drop Both Eyes Q12H    glucose chewable tablet 16 g  4 Tab Oral PRN    glucagon (GLUCAGEN) injection 1 mg  1 mg IntraMUSCular PRN    dextrose (D50W) injection syrg 12.5-25 g  25-50 mL IntraVENous PRN    pantoprazole (PROTONIX) 40 mg in sodium chloride 0.9 % 10 mL injection  40 mg IntraVENous DAILY          Labs:    Recent Results (from the past 24 hour(s))   POC G3    Collection Time: 07/24/17 11:37 AM   Result Value Ref Range    Device: VENT      FIO2 (POC) 70 %    pH (POC) 7.387 7.35 - 7.45      pCO2 (POC) 33.2 (L) 35.0 - 45.0 MMHG    pO2 (POC) 121 (H) 80 - 100 MMHG    HCO3 (POC) 19.9 (L) 22 - 26 MMOL/L    sO2 (POC) 99 (H) 92 - 97 %    Base deficit (POC) 5 mmol/L    Mode ASSIST CONTROL      Set Rate 14 bpm    PEEP/CPAP (POC) 5 cmH2O    PIP (POC) 27      Allens test (POC) N/A      Inspiratory Time 1 sec    Total resp. rate 15      Site DRAWN FROM ARTERIAL LINE      Patient temp.  99.6      Specimen type (POC) ARTERIAL      Performed by Hubert espinoza YES     GLUCOSE, POC    Collection Time: 07/24/17 11:57 AM   Result Value Ref Range Glucose (POC) 242 (H) 70 - 110 mg/dL   POC G3    Collection Time: 07/24/17  5:28 PM   Result Value Ref Range    Device: VENT      FIO2 (POC) 50 %    pH (POC) 7.391 7.35 - 7.45      pCO2 (POC) 32.4 (L) 35.0 - 45.0 MMHG    pO2 (POC) 94 80 - 100 MMHG    HCO3 (POC) 19.6 (L) 22 - 26 MMOL/L    sO2 (POC) 97 92 - 97 %    Base deficit (POC) 5 mmol/L    Mode ASSIST CONTROL      Set Rate 14 bpm    PEEP/CPAP (POC) 5 cmH2O    PIP (POC) 26      Allens test (POC) N/A      Inspiratory Time 1 sec    Total resp. rate 15      Site DRAWN FROM ARTERIAL LINE      Patient temp. 99.2      Specimen type (POC) ARTERIAL      Performed by Brett [place]     Pressure control YES     GLUCOSE, POC    Collection Time: 07/24/17  6:52 PM   Result Value Ref Range    Glucose (POC) 202 (H) 70 - 110 mg/dL   GLUCOSE, POC    Collection Time: 07/25/17 12:29 AM   Result Value Ref Range    Glucose (POC) 108 70 - 110 mg/dL   CBC WITH AUTOMATED DIFF    Collection Time: 07/25/17  4:00 AM   Result Value Ref Range    WBC 8.2 4.6 - 13.2 K/uL    RBC 3.03 (L) 4.70 - 5.50 M/uL    HGB 8.0 (L) 13.0 - 16.0 g/dL    HCT 24.8 (L) 36.0 - 48.0 %    MCV 81.8 74.0 - 97.0 FL    MCH 26.4 24.0 - 34.0 PG    MCHC 32.3 31.0 - 37.0 g/dL    RDW 14.9 (H) 11.6 - 14.5 %    PLATELET 331 529 - 964 K/uL    MPV 10.9 9.2 - 11.8 FL    NEUTROPHILS 78 (H) 40 - 73 %    LYMPHOCYTES 9 (L) 21 - 52 %    MONOCYTES 12 (H) 3 - 10 %    EOSINOPHILS 1 0 - 5 %    BASOPHILS 0 0 - 2 %    ABS. NEUTROPHILS 6.4 1.8 - 8.0 K/UL    ABS. LYMPHOCYTES 0.7 (L) 0.9 - 3.6 K/UL    ABS. MONOCYTES 1.0 0.05 - 1.2 K/UL    ABS. EOSINOPHILS 0.1 0.0 - 0.4 K/UL    ABS.  BASOPHILS 0.0 0.0 - 0.1 K/UL    DF AUTOMATED     METABOLIC PANEL, COMPREHENSIVE    Collection Time: 07/25/17  4:00 AM   Result Value Ref Range    Sodium 143 136 - 145 mmol/L    Potassium 4.4 3.5 - 5.5 mmol/L    Chloride 113 (H) 100 - 108 mmol/L    CO2 22 21 - 32 mmol/L    Anion gap 8 3.0 - 18 mmol/L    Glucose 244 (H) 74 - 99 mg/dL    BUN 67 (H) 7.0 - 18 MG/DL Creatinine 4.13 (H) 0.6 - 1.3 MG/DL    BUN/Creatinine ratio 16 12 - 20      GFR est AA 18 (L) >60 ml/min/1.73m2    GFR est non-AA 15 (L) >60 ml/min/1.73m2    Calcium 7.9 (L) 8.5 - 10.1 MG/DL    Bilirubin, total 0.4 0.2 - 1.0 MG/DL    ALT (SGPT) 65 (H) 16 - 61 U/L    AST (SGOT) 32 15 - 37 U/L    Alk. phosphatase 68 45 - 117 U/L    Protein, total 5.9 (L) 6.4 - 8.2 g/dL    Albumin 1.7 (L) 3.4 - 5.0 g/dL    Globulin 4.2 (H) 2.0 - 4.0 g/dL    A-G Ratio 0.4 (L) 0.8 - 1.7     PHOSPHORUS    Collection Time: 07/25/17  4:00 AM   Result Value Ref Range    Phosphorus 4.3 2.5 - 4.9 MG/DL   PROTHROMBIN TIME + INR    Collection Time: 07/25/17  4:00 AM   Result Value Ref Range    Prothrombin time 21.6 (H) 11.5 - 15.2 sec    INR 2.0 (H) 0.8 - 1.2     MAGNESIUM    Collection Time: 07/25/17  4:00 AM   Result Value Ref Range    Magnesium 2.4 1.6 - 2.6 mg/dL   POC G3    Collection Time: 07/25/17  4:02 AM   Result Value Ref Range    Device: VENT      FIO2 (POC) 40 %    pH (POC) 7.397 7.35 - 7.45      pCO2 (POC) 32.7 (L) 35.0 - 45.0 MMHG    pO2 (POC) 75 (L) 80 - 100 MMHG    HCO3 (POC) 20.0 (L) 22 - 26 MMOL/L    sO2 (POC) 95 92 - 97 %    Base deficit (POC) 5 mmol/L    Mode ASSIST CONTROL      Set Rate 14 bpm    PEEP/CPAP (POC) 5 cmH2O    PIP (POC) 20      Allens test (POC) N/A      Total resp. rate 14      Site DRAWN FROM ARTERIAL LINE      Patient temp.  99.8      Specimen type (POC) ARTERIAL      Performed by Traun Hsu     Pressure control YES     GLUCOSE, POC    Collection Time: 07/25/17  6:45 AM   Result Value Ref Range    Glucose (POC) 196 (H) 70 - 110 mg/dL   EKG, 12 LEAD, SUBSEQUENT    Collection Time: 07/25/17  7:40 AM   Result Value Ref Range    Ventricular Rate 72 BPM    Atrial Rate 72 BPM    QRS Duration 240 ms    Q-T Interval 532 ms    QTC Calculation (Bezet) 582 ms    Calculated R Axis -74 degrees    Calculated T Axis 101 degrees    Diagnosis       Electronic ventricular pacemaker  When compared with ECG of 24-JUL-2017 07:29,  Electronic ventricular pacemaker has replaced Sinus rhythm     VANCOMYCIN, RANDOM    Collection Time: 07/25/17  8:00 AM   Result Value Ref Range    Vancomycin, random 11.3 5.0 - 40.0 UG/ML     EEG:  ELECTROENCEPHALOGRAM INTERPRETATION: This is a  markedly abnormal recording due to the presence of marked  suppression of background activity. The patient had been on sedation,  however, so does not fit the requirement for electrocortical silence. Unvarying suppression of background activity without reactivity or poor  prognostic indicators for meaningful recovery. No epileptiform  discharges were noted. Chest Xray:  Impression:       1. Support tubes/devices, as above. 2. Stable enlargement of the cardiac silhouette. Probable mild pulmonary  vascular congestion. 3. Increased bibasilar hazy opacities, left greater than right, possibly  atelectasis versus infiltrate/edema. Small left effusion is not excluded. 2D echocardiogram:  SUMMARY:  Procedure information: This was a technically difficult study. Intravenous  contrast (Definity) was administered. Left ventricle: Systolic function was moderately to markedly reduced by  visual assessment. Ejection fraction was estimated to be 30 %. There was  severe hypokinesis of the mid-apical anterior, basal-mid inferoseptal,  entire inferior, apical septal, apical lateral, and apical wall(s). Wall  thickness was mildly to moderately increased. Right atrium: The atrium was mildly dilated. Mitral valve: There was mild regurgitation. Aorta, systemic arteries: The root exhibited mild dilatation.     All Micro Results     Procedure Component Value Units Date/Time    CULTURE, RESPIRATORY/SPUTUM/BRONCH Marce Hikes STAIN [[id number]]  (Abnormal)  (Susceptibility) Collected:  07/22/17 1615    Order Status:  Completed Specimen:  Sputum from Sputum,ET Suction Updated:  07/25/17 0903     Special Requests: NO SPECIAL REQUESTS        GRAM STAIN MODERATE WBC'S MODERATE GRAM POSITIVE COCCI IN PAIRS IN GROUPS      FEW GRAM NEGATIVE RODS        Culture result:         MANY STAPHYLOCOCCUS AUREUS (A)              MODERATE ENTEROBACTER AEROGENES (A)              MODERATE PROTEUS MIRABILIS (A)              FEW NORMAL RESPIRATORY RENARD    CULTURE, BLOOD [[id number]] Collected:  07/23/17 1755    Order Status:  Completed Specimen:  Blood from Blood Updated:  07/25/17 0753     Special Requests: NO SPECIAL REQUESTS        Culture result: NO GROWTH 2 DAYS       CULTURE, BLOOD [[id number]] Collected:  07/23/17 1915    Order Status:  Completed Specimen:  Blood from Blood Updated:  07/25/17 0753     Special Requests: NO SPECIAL REQUESTS        Culture result: NO GROWTH 2 DAYS       CULTURE, BLOOD [[id number]] Collected:  07/22/17 1557    Order Status:  Completed Specimen:  Blood from Blood Updated:  07/25/17 0753     Special Requests: NO SPECIAL REQUESTS        Culture result: NO GROWTH 3 DAYS       CULTURE, BLOOD [[id number]] Collected:  07/22/17 1600    Order Status:  Completed Specimen:  Blood from Blood Updated:  07/25/17 0753     Special Requests: NO SPECIAL REQUESTS        Culture result: NO GROWTH 3 DAYS       CULTURE, BLOOD [[id number]] Collected:  07/19/17 1530    Order Status:  Completed Specimen:  Blood from Blood Updated:  07/25/17 0753     Special Requests: NO SPECIAL REQUESTS        Culture result: NO GROWTH 6 DAYS       CULTURE, RESPIRATORY/SPUTUM/BRONCH Evelena [place] STAIN [[id number]]  (Abnormal) Collected:  07/23/17 1755    Order Status:  Completed Specimen:  Sputum from Sputum,ET Suction Updated:  07/24/17 1204     Special Requests: NO SPECIAL REQUESTS        GRAM STAIN >25 WBC/lpf         <10 EPI/lpf         MUCUS PRESENT                 FEW GRAM POSITIVE COCCI IN PAIRS IN GROUPS      RARE GRAM POSITIVE RODS         RARE GRAM NEGATIVE RODS        Culture result:         MODERATE STAPHYLOCOCCUS SPECIES (A)              FEW GRAM NEGATIVE RODS (A)              FEW 2ND GRAM NEGATIVE ETIENNE (A)    CULTURE, URINE [[id number]] Collected:  07/22/17 1735    Order Status:  Completed Specimen:  Urine from [place] Specimen Updated:  07/24/17 0858     Special Requests: NO SPECIAL REQUESTS        Culture result: NO GROWTH 2 DAYS       CULTURE, URINE [[id number]] Collected:  07/20/17 0900    Order Status:  Completed Specimen:  [place] Specimen Updated:  07/22/17 0838     Special Requests: NO SPECIAL REQUESTS        Culture result: NO GROWTH 2 DAYS       CULTURE, BLOOD [[id number]]  (Abnormal)  (Susceptibility) Collected:  07/19/17 1530    Order Status:  Completed Specimen:  Blood from Blood Updated:  07/22/17 0708     Special Requests: NONE        GRAM STAIN         AEROBIC AND ANAEROBIC BOTTLES GRAM POSITIVE COCCI              SMEAR CALLED TO AND CORRECTLY REPEATED BY: TOYA CHANG CCU 1130 7/20 TO [facility]     Culture result:         ANAEROBIC BOTTLE STAPHYLOCOCCUS EPIDERMIDIS (A)              AEROBIC BOTTLE STAPHYLOCOCCUS LUGDUNENSIS (A)          Assessment/Plan:     1. Cardio:    A. Cardiac arrest: Pulseless VT, post AICD shock, off from hypothermia protocol, VERY POOR PROGNOSIS       - device interrogated, normal       - on Amiodarone drip    B.  CAD with recent troponin elevation, secondary to VT and shocks    C. History of VT with AICD, to schedule for VT ablation but cancelled due to apical thrombus    D. Ischemic cardiomyopathy, EF 30%, proBNP not elevated on admission    E.  Paroxysmal atrial fibrillation, in sinus rhythm     2. Resp:     A. Ventilation dependent hypoxic respiratory failure, remains intubated and unable to wean off ventilation        - Vent cont, management per ICU protocol        - Family has consider compassionate extubation but wants EEG      B. Pneumonia with Staphylococcus, Enterobacter, Proteus         - Cont on Vanc/Zosyn, no grow on blood culture, follow up on repeat respiratory culture     3. Neuro:     A.   Anoxic brain injury, with unresponsive, Poor Prognosis,        - open eyes today on sternal rub but no purposeful response or follow commands       - follow up with EEG today and follow up with Neurology     B. Persistent myoclonus, seizure       - on Keppra, EEG today, neurology follows      4. :     A. NUSRAT on CKD III, ATN      - NOT A CANDIDATE FOR HEMODIALYSIS     5.  GI:     A. Liver shock wit transaminitis, improving. INR remains elevated       B. NPO for today    6. ID:     A. Contaminated blood culture, No growth on repeat      B. Urine with gram negative rods and gram positive cocci       - ON VANCO/ZOSYN     C. Pneumonia with Staph, Enterobacter, Proteus, continue on Vanco/Zosyn    7. Endo:     A. Hyperglycemia with DMT2. On ISS per ICU protocol    8. Heme/Onc:      A. Hypercoagulable state, INR remains elevated.   Off coumadin       Additional Notes:    Time spent 40 minutes    Case discussed with:  []Patient  [x]Family  [x]Nursing  [x]Case Management  DVT Prophylaxis:  []Lovenox  []Hep SQ  [x]SCDs  []Coumadin   []On Heparin gtt    Signed By: Gloria Lynne MD     July 25, 2017 9:24 AM

## 2017-07-25 NOTE — PROGRESS NOTES
attended the interdisciplinary rounds for Karla Hammer, who is a 74 y. o.,male. Patients Primary Language is: [place]. According to the patients EMR Taoist Affiliation is: Yohan Arenas.     The reason the Patient came to the hospital is:   Patient Active Problem List    Diagnosis Date Noted    Cardiac arrest [facility]) 07/19/2017    Ischemic cardiomyopathy     MI (myocardial infarction) ([address]     Apical mural thrombus     CKD (chronic kidney disease) stage 3, GFR 30-59 ml/min     Diabetes ([address]     Systolic heart failure ([address]     AICD (automatic cardioverter/defibrillator) present     Type 2 diabetes mellitus with stage 2 chronic kidney disease ([address] 07/28/2016    SBO (small bowel obstruction) ([address] 01/21/2016    Type 2 diabetes mellitus without complication ([address] 64/13/6319    Chronic kidney disease, stage III (moderate) 08/14/2015    Hyperlipidemia     DM type 2 (diabetes mellitus, type 2) ([address]     Essential hypertension, benign     Ventricular tachycardia (HCC)     Congestive heart failure (HCC)     Other and unspecified angina pectoris     Automatic implantable cardioverter-defibrillator in situ 04/22/2014    Generalized ischemic myocardial dysfunction 06/07/2010    Hypertension 06/07/2010    Diabetes mellitus type 1A ([address] 06/07/2010    Atrial paroxysmal tachycardia ([address] 06/07/2010      Plan:  Chaplains will continue to follow and will provide pastoral care on an as needed/requested basis.  recommends bedside caregivers page  on duty if patient shows signs of acute spiritual or emotional distress.     [address] [facility]  Board Certified [facility]   [phone]

## 2017-07-25 NOTE — ROUTINE PROCESS
Patient has fixed pinpoint pupils but still reactive. Weak gag abd cough reflexes also noted. Continue to have myoclonic jerking intermittently and get worse with activity. PRN Ativan given x2 and it helps te,mporarily, able to rest without jerking for an hour or two.

## 2017-07-25 NOTE — PROGRESS NOTES
Still no meaningful neurological recovery. I will stop amiodarone IV and switch to oral.  Will continue to follow peripherally, please call if questions.

## 2017-07-25 NOTE — INTERDISCIPLINARY ROUNDS
CRITICAL CARE INTERDISCIPLINARY ROUNDS      Patient Information:    Name:   Janee Hill    Age:   74 y.o.     Admission Date:   7/19/2017    Readmit Risk Assessment Information:      Readmit Risk Tool Support Systems: Friends \ neighbors, Relationship with Primary Physician Group: Seen at least one time within the past 6 months    Surgery Date:      Day of Stay:     Expected Discharge Date:        Attending Provider:   Erin Carr MD    Surgeon:        Consultant:       Primary Care Provider:   Leyla Yuen NP    Problem List:     Patient Active Problem List   Diagnosis Code    Hyperlipidemia E78.5    DM type 2 (diabetes mellitus, type 2) ([address] E11.9    Essential hypertension, benign I10    Ventricular tachycardia ([address] I47.2    Congestive heart failure ([address] I50.9    Other and unspecified angina pectoris I20.9    Chronic kidney disease, stage III (moderate) N18.3    Type 2 diabetes mellitus without complication ([address] K45.8    SBO (small bowel obstruction) ([address] K56.69    Type 2 diabetes mellitus with stage 2 chronic kidney disease ([address] E11.22, N18.2    Generalized ischemic myocardial dysfunction I25.5    Automatic implantable cardioverter-defibrillator in situ Z95.810    Hypertension I10    Diabetes mellitus type 1A ([address] E10.9    Atrial paroxysmal tachycardia (HCC) I47.1    CKD (chronic kidney disease) stage 3, GFR 30-59 ml/min N18.3    Diabetes ([address] J59.3    Systolic heart failure ([address] I50.20    AICD (automatic cardioverter/defibrillator) present Z95.810    Ischemic cardiomyopathy I25.5    MI (myocardial infarction) ([address] I21.3    Apical mural thrombus [id number]    Cardiac arrest ([address] I46.9       Principal Problem:  <principal problem not specified>    Procedure:       During rounds the following quality care indicators and evidence based practices were addressed :     DVT Prophylaxis, Pressure Injury Prevention, Pain Management, Sepsis resuscitation and management, Nutritional Status, Critical Care Interventions Airways, Drains and Lines and IHI Bundles: Central Line Bundle Followed , [place] Bundle Followed and Vent Bundle Followed, Vent Day 6           Acute MI/PCI:   Not applicable    Heart Failure:    Central Line Bundle Followed , [place] Bundle Followed and Vent Bundle Followed, Vent Day 6    Cardiac Surgery:  Not applicable    SCIP Measures for other Surgeries:   Not applicable    Pneumonia:    Appropriate Antibiotic Selection (ICU versus Non-ICU)    Stroke:  Patient's Personal Risk Factors for Stroke are:   hypertension, family history, hyperlipidemia or diabetes mellitus    NIH Stroke Score       Transfer Level of Care:  Progressing to Transfer    The patient will require the following interventions based on the Readmission Risk Assessment:  Pharmacy evaluation and teaching, Care Management involvement for home health follow up for:  mobility and assistance with ADL's and Spiritual Care evaluation      Discharge Management:  Home and Palliative Care    Anticipated Discharge Date:  3      Interdisciplinary team rounds were held  with the following team membersCare Management, Diabetes Treatment Specialist, Nursing, Nutrition, Pastoral Care, Pharmacy, Physician and Clinical Coordinator and the  patient and sibling(s). Plan of care discussed. See clinical pathway and/or care plan for interventions and desired outcomes. EEG repeat today. Possible compassionate extubation today.

## 2017-07-25 NOTE — PROGRESS NOTES
Subjective:  Symptoms:  Stable. Diet:  NPO. Patient intubated and unresponsive on vent; sedation stopped yesterday and still has myoclonic jerking  Does not follow any commands  He had elevated temp overnight but decreased with acetaminophen   No other events noted    Temp:  [98.5 °F (36.9 °C)-101.4 °F (38.6 °C)]   Pulse (Heart Rate):  [60-78]   BP: ()/()   Resp Rate:  [6-43]   O2 Sat (%):  [88 %-100 %]   Weight:  [104.1 kg (229 lb 8 oz)-109 kg (240 lb 4.8 oz)]   07/25 0701 - 07/25 1900  In: -   Out: 100 [Urine:100]  07/23 1901 - 07/25 0700  In: 4027.6 [I.V.:2247.6]  Out: 2265 [Urine:1765]      Objective:  General Appearance:  Comfortable, in no acute distress and ill-appearing. Vital signs: (most recent): Blood pressure 105/57, pulse 71, temperature 99.5 °F (37.5 °C), resp. rate (!) 6, height 6' 1\" (1.854 m), weight 104.1 kg (229 lb 8 oz), SpO2 98 %. Output: Producing urine and producing stool. Lungs:  Normal respiratory rate and normal effort. Breath sounds clear to auscultation. Heart: Normal rate. Regular rhythm. Neurological: Patient is alert and oriented to person, place and time. Skin:  Warm and dry. Abdomen: Abdomen is soft. Hypoactive bowel sounds. There is no abdominal tenderness. Recent Results (from the past 24 hour(s))   POC G3    Collection Time: 07/24/17 11:37 AM   Result Value Ref Range    Device: VENT      FIO2 (POC) 70 %    pH (POC) 7.387 7.35 - 7.45      pCO2 (POC) 33.2 (L) 35.0 - 45.0 MMHG    pO2 (POC) 121 (H) 80 - 100 MMHG    HCO3 (POC) 19.9 (L) 22 - 26 MMOL/L    sO2 (POC) 99 (H) 92 - 97 %    Base deficit (POC) 5 mmol/L    Mode ASSIST CONTROL      Set Rate 14 bpm    PEEP/CPAP (POC) 5 cmH2O    PIP (POC) 27      Allens test (POC) N/A      Inspiratory Time 1 sec    Total resp. rate 15      Site DRAWN FROM ARTERIAL LINE      Patient temp.  99.6      Specimen type (POC) ARTERIAL      Performed by Radha espinoza YES     GLUCOSE, POC Collection Time: 07/24/17 11:57 AM   Result Value Ref Range    Glucose (POC) 242 (H) 70 - 110 mg/dL   POC G3    Collection Time: 07/24/17  5:28 PM   Result Value Ref Range    Device: VENT      FIO2 (POC) 50 %    pH (POC) 7.391 7.35 - 7.45      pCO2 (POC) 32.4 (L) 35.0 - 45.0 MMHG    pO2 (POC) 94 80 - 100 MMHG    HCO3 (POC) 19.6 (L) 22 - 26 MMOL/L    sO2 (POC) 97 92 - 97 %    Base deficit (POC) 5 mmol/L    Mode ASSIST CONTROL      Set Rate 14 bpm    PEEP/CPAP (POC) 5 cmH2O    PIP (POC) 26      Allens test (POC) N/A      Inspiratory Time 1 sec    Total resp. rate 15      Site DRAWN FROM ARTERIAL LINE      Patient temp. 99.2      Specimen type (POC) ARTERIAL      Performed by Hannah Miner     Pressure control YES     GLUCOSE, POC    Collection Time: 07/24/17  6:52 PM   Result Value Ref Range    Glucose (POC) 202 (H) 70 - 110 mg/dL   GLUCOSE, POC    Collection Time: 07/25/17 12:29 AM   Result Value Ref Range    Glucose (POC) 108 70 - 110 mg/dL   CBC WITH AUTOMATED DIFF    Collection Time: 07/25/17  4:00 AM   Result Value Ref Range    WBC 8.2 4.6 - 13.2 K/uL    RBC 3.03 (L) 4.70 - 5.50 M/uL    HGB 8.0 (L) 13.0 - 16.0 g/dL    HCT 24.8 (L) 36.0 - 48.0 %    MCV 81.8 74.0 - 97.0 FL    MCH 26.4 24.0 - 34.0 PG    MCHC 32.3 31.0 - 37.0 g/dL    RDW 14.9 (H) 11.6 - 14.5 %    PLATELET 094 363 - 285 K/uL    MPV 10.9 9.2 - 11.8 FL    NEUTROPHILS 78 (H) 40 - 73 %    LYMPHOCYTES 9 (L) 21 - 52 %    MONOCYTES 12 (H) 3 - 10 %    EOSINOPHILS 1 0 - 5 %    BASOPHILS 0 0 - 2 %    ABS. NEUTROPHILS 6.4 1.8 - 8.0 K/UL    ABS. LYMPHOCYTES 0.7 (L) 0.9 - 3.6 K/UL    ABS. MONOCYTES 1.0 0.05 - 1.2 K/UL    ABS. EOSINOPHILS 0.1 0.0 - 0.4 K/UL    ABS.  BASOPHILS 0.0 0.0 - 0.1 K/UL    DF AUTOMATED     METABOLIC PANEL, COMPREHENSIVE    Collection Time: 07/25/17  4:00 AM   Result Value Ref Range    Sodium 143 136 - 145 mmol/L    Potassium 4.4 3.5 - 5.5 mmol/L    Chloride 113 (H) 100 - 108 mmol/L    CO2 22 21 - 32 mmol/L    Anion gap 8 3.0 - 18 mmol/L Glucose 244 (H) 74 - 99 mg/dL    BUN 67 (H) 7.0 - 18 MG/DL    Creatinine 4.13 (H) 0.6 - 1.3 MG/DL    BUN/Creatinine ratio 16 12 - 20      GFR est AA 18 (L) >60 ml/min/1.73m2    GFR est non-AA 15 (L) >60 ml/min/1.73m2    Calcium 7.9 (L) 8.5 - 10.1 MG/DL    Bilirubin, total 0.4 0.2 - 1.0 MG/DL    ALT (SGPT) 65 (H) 16 - 61 U/L    AST (SGOT) 32 15 - 37 U/L    Alk. phosphatase 68 45 - 117 U/L    Protein, total 5.9 (L) 6.4 - 8.2 g/dL    Albumin 1.7 (L) 3.4 - 5.0 g/dL    Globulin 4.2 (H) 2.0 - 4.0 g/dL    A-G Ratio 0.4 (L) 0.8 - 1.7     PHOSPHORUS    Collection Time: 07/25/17  4:00 AM   Result Value Ref Range    Phosphorus 4.3 2.5 - 4.9 MG/DL   PROTHROMBIN TIME + INR    Collection Time: 07/25/17  4:00 AM   Result Value Ref Range    Prothrombin time 21.6 (H) 11.5 - 15.2 sec    INR 2.0 (H) 0.8 - 1.2     MAGNESIUM    Collection Time: 07/25/17  4:00 AM   Result Value Ref Range    Magnesium 2.4 1.6 - 2.6 mg/dL   POC G3    Collection Time: 07/25/17  4:02 AM   Result Value Ref Range    Device: VENT      FIO2 (POC) 40 %    pH (POC) 7.397 7.35 - 7.45      pCO2 (POC) 32.7 (L) 35.0 - 45.0 MMHG    pO2 (POC) 75 (L) 80 - 100 MMHG    HCO3 (POC) 20.0 (L) 22 - 26 MMOL/L    sO2 (POC) 95 92 - 97 %    Base deficit (POC) 5 mmol/L    Mode ASSIST CONTROL      Set Rate 14 bpm    PEEP/CPAP (POC) 5 cmH2O    PIP (POC) 20      Allens test (POC) N/A      Total resp. rate 14      Site DRAWN FROM ARTERIAL LINE      Patient temp.  99.8      Specimen type (POC) ARTERIAL      Performed by Antolin Hodgson     Pressure control YES     GLUCOSE, POC    Collection Time: 07/25/17  6:45 AM   Result Value Ref Range    Glucose (POC) 196 (H) 70 - 110 mg/dL   EKG, 12 LEAD, SUBSEQUENT    Collection Time: 07/25/17  7:40 AM   Result Value Ref Range    Ventricular Rate 72 BPM    Atrial Rate 72 BPM    QRS Duration 240 ms    Q-T Interval 532 ms    QTC Calculation (Bezet) 582 ms    Calculated R Axis -74 degrees    Calculated T Axis 101 degrees    Diagnosis       Electronic ventricular pacemaker  When compared with ECG of 24-JUL-2017 07:29,  Electronic ventricular pacemaker has replaced Sinus rhythm     VANCOMYCIN, RANDOM    Collection Time: 07/25/17  8:00 AM   Result Value Ref Range    Vancomycin, random 11.3 5.0 - 40.0 UG/ML         Active Problems:    Hyperlipidemia ()      DM type 2 (diabetes mellitus, type 2) (HCC) ()      Essential hypertension, benign ()      Ventricular tachycardia ([facility]) ()      Overview: Overview:       S/p AICD      [facility] study      Congestive heart failure (HCC) ()      Type 2 diabetes mellitus with stage 2 chronic kidney disease ([facility] (7/28/2016)      Atrial paroxysmal tachycardia ([facility] (6/7/2010)      AICD (automatic cardioverter/defibrillator) present ()      Ischemic cardiomyopathy ()      Card[facility] arrest ([facility] (7/19/2017)          Assessment:    Condition: In critical condition. Unchanged.        Patient s/p cardiac arrest post therapeutic hypothermia and still with likely severe anoxic brain injury  Prognosis poor as noted  Repeat EEG planned today and depending on results and family decisions   possible w/d of care later today    No further to add or change at this time

## 2017-07-25 NOTE — CDMP QUERY
Please clarify if sepsis has been ruled in or out.    =>  =>Other Explanation of clinical findings  =>Unable to Determine (no explanation of clinical findings)    The medical record reflects the following:    Clinical Indicators: febrile episodes 101.1-101.4. WBCs up to 16.2 on 7/20. Neutrophils 80/14 bands on 7/22 - neuts up to 49 on 7/23. Pos sputum and blood cxs. Per 7/21 IM note: Blood cultures resulting in positive staph species possibly a second staph species after 2 days, likely contaminant, no suspicion for infectious etiology for intensivist.  Per 7/23 IM note: Clindamycin started yesterday d/t concern for Staph positive in 1 bottle of blood culture. Urine grew Gram negative. Per 7/23 Pulm note: ATN secondary to hypotension, sepsis    Treatment: IV ABX. Please clarify and document your clinical opinion in the progress notes and discharge summary including the definitive and/or presumptive diagnosis, (suspected or probable), related to the above clinical findings. Please include clinical findings supporting your diagnosis.     Thank you    Charis Brand RN CCDS  [phone]

## 2017-07-25 NOTE — PROGRESS NOTES
NUTRITION    Nutrition Screen      RECOMMENDATIONS / PLAN:     - Advance tube feeding of Glucerna 1.5 as tolerated to goal rate of 60 mL/hr and continue 150 mL q 6 hour water flushes.  - Continue IV fluid for hydration as medically appropriate. - Provide nutrition interventions consistent with plan of care. - Continue RD inpatient monitoring and evaluation. Goal Regimen: Glucerna 1.5 at 60 mL/hr + 150 mL q 6 hour water flushes to provide: 2160 kcal, 119 gm protein, 108 gm fat, 192 gm CHO, 23 gm fiber, 1093 mL free water, 100% RDIs     NUTRITION INTERVENTIONS & DIAGNOSIS:     [x] IV fluid: NS at 50 mL/hr    [x] Enteral nutrition support: gradual advancement   [x] Vitamin/mineral supplementation: electrolyte replacement protocol  [x] Coordination of care: interdisciplinary rounds     Nutrition Diagnosis: Inadequate oral intake related to inability to tolerate po as evidenced by NPO. ASSESSMENT:      7/25: Feeding resumed at 10 mL/hr at 12 pm yesterday and advancing gradually. Plan for compassionate extubation today. 7/24: Renal function worsening and poor UOP, Crea up to 4.14, continue IV fluid per Nephrology. Feedings up to 60 mL/hr then held this morning for 560 mL residual. Possible compassionate extubation tomorrow. 7/22: Tolerating feeding advancement. Sodium WNL, developed trace edema, minimal UOP.  7/21: Pt anticipated to reach goal rewarming temperature today, will start feedings once protocol completed per MD.   7/20: Pt intubated on hypothermia protocol- to start rewarming at 15:00 today. OGT to continuous suction.      Average po intake adequate to meet patients estimated nutritional needs:   [] Yes     [x] No   [] Unable to determine at this time    EN infusion adequate to meet patients estimated nutritional needs:   [] Yes     [x] No   [] Unable to determine at this time    Tube Feeding: Glucerna 1.5 at 30 mL/hr via OGT   Water Flushes: 150 mL q 6 hours   Residuals: 70 mL (less than 100 mL x last 24 hours per RN)    Diet: DIET NPO  DIET TUBE FEEDING      Food Allergies: NKFA  Current Appetite:   [] Good     [] Fair     [] Poor     [x] Other: NPO  Appetite/meal intake prior to admission:   [] Good     [] Fair     [] Poor     [x] Other: unknown   Feeding Limitations:  [] Swallowing difficulty    [] Chewing difficulty    [x] Other: intubated   Current Meal Intake: No data found. BM: 7/24  Skin Integrity: WDL  Edema: 2+ generalized   Pertinent Medications: Reviewed    Recent Labs      07/25/17   0400  07/24/17   0330  07/23/17   0439   NA  143  141  139   K  4.4  4.1  4.8   CL  113*  111*  108   CO2  22  21  20*   GLU  244*  220*  213*   BUN  67*  65*  53*   CREA  4.13*  4.14*  4.26*   CA  7.9*  7.4*  7.8*   MG  2.4  2.2  2.1   PHOS  4.3  3.6  4.1   ALB  1.7*  1.7*  2.1*   SGOT  32  38*  53*   ALT  65*  85*  132*       Intake/Output Summary (Last 24 hours) at 07/25/17 0946  Last data filed at 07/25/17 0900   Gross per 24 hour   Intake          2750.56 ml   Output              990 ml   Net          1760.56 ml       Anthropometrics:  Ht Readings from Last 1 Encounters:   07/19/17 6' 1\" (1.854 m)     Last 3 Recorded Weights in this Encounter    07/23/17 0000 07/24/17 0200 07/25/17 0500   Weight: 107 kg (235 lb 12.8 oz) 109 kg (240 lb 4.8 oz) 104.1 kg (229 lb 8 oz)     Body mass index is 30.28 kg/(m^2).           Weight History:   Weight Metrics 7/25/2017 12/29/2016 8/9/2016 7/28/2016 3/31/2016 3/17/2016 2/5/2016   Weight 229 lb 8 oz 215 lb 219 lb 215 lb 218 lb 218 lb 228 lb   BMI 30.28 kg/m2 28.37 kg/m2 29.7 kg/m2 29.15 kg/m2 29.56 kg/m2 29.56 kg/m2 30.92 kg/m2        Admitting Diagnosis: Cardiac arrest ([facility]  Pertinent PMHx: CKD, DM, HF    Education Needs:        [x] None identified  [] Identified - Not appropriate at this time  []  Identified and addressed - refer to education log  Learning Limitations:   [] None identified  [x] Identified: intubated     Cultural, Muslim & ethnic food preferences:  [x] None identified    [] Identified and addressed     ESTIMATED NUTRITION NEEDS:     Calories: 7717-8896 kcal ([id number]) based on  [x] Actual BW 96 kg     [] IBW   Protein: 115-192 gm (1.2-2 gm/kg) based on  [x] Actual BW      [] IBW   Fluid: 1 mL/kcal     MONITORING & EVALUATION:     Nutrition Goal(s):  1. Nutritional needs will be met through adequate oral intake or nutrition support within the next 7 days. Outcome:  [] Met/Ongoing    [x]  Not Met/Progressing    [] New/Initial Goal   2. Provide nutrition intervention as appropriate with goals of care for the next 5-7 days.  Outcome:  [x] Met/Ongoing    []  Not Met    [] New/Initial Goal     Monitoring:   [x] EN tolerance   [x] EN infusion   [] Supplement intake   [x] GI symptoms/ability to tolerate po diet   [x] Respiratory status   [x] Plan of care      Previous Recommendations (for follow-up assessments only):     [x]   Implemented       []   Not Implemented (RD to address)     [] No Recommendation Made     Discharge Planning: pending ability to tolerate po and plan of care  [x] Participated in care planning, discharge planning, & interdisciplinary rounds as appropriate      Shelia Ferguson, [address], [address] [place] [address]    Pager: [phone]

## 2017-07-25 NOTE — ROUTINE PROCESS
BSBAR, Kardex, Intake/Output, MAR, Recent Results and Cardiac Rhythm bedside, Verbal and Written shift change report given to Yvette Hernandez RN (oncoming nurse) by Corinne Crowe RN   (offgoing nurse). Report included the following informatiPACEDSBAR, Kardex, Intake/Output, MAR, Recent Results and Cardiac Rhythm PACED.

## 2017-07-26 NOTE — PROGRESS NOTES
Re:  Wil Guerrero,Follow up visit     7/26/2017 1:22 PM    SSN: [id number]    Subjective:   Marilu Gutierrez is seen in follow up. Both Propofol, and Fentanyl sedation have been stopped. He's had some myoclonic twitching according to the staff, but only minimal unless stimulated.          Medications:    Current Facility-Administered Medications   Medication Dose Route Frequency Provider Last Rate Last Dose    piperacillin-tazobactam (ZOSYN) 3.375 g in 0.9% sodium chloride (MBP/ADV) 100 mL MBP  3.375 g IntraVENous Q6H Jennifer Reilly  mL/hr at 07/26/17 0900 3.375 g at 07/26/17 0900    amiodarone (CORDARONE) tablet 200 mg  200 mg Per NG tube BID Sumit Bradshaw MD   200 mg at 07/26/17 0850    ELECTROLYTE REPLACEMENT PROTOCOL  1 Each Other PRN Shelley A Day, PA        ELECTROLYTE REPLACEMENT PROTOCOL  1 Each Other PRN Shelley A Day, PA        ELECTROLYTE REPLACEMENT PROTOCOL  1 Each Other PRN Shelley A Day, PA        acetaminophen (TYLENOL) solution 1,000 mg  1,000 mg Per NG tube Q6H PRN Shelley A Day, PA   1,000 mg at 07/25/17 0056    LORazepam (ATIVAN) injection 2-4 mg  2-4 mg IntraVENous Q2H PRN Katherene Fraction, NP   4 mg at 07/26/17 0200    insulin lispro (HUMALOG) injection   SubCUTAneous Q6H Govind Norris MD   9 Units at 07/26/17 1158    levETIRAcetam (KEPPRA) 500 mg in 100 ml IVPB  500 mg IntraVENous Q12H Katherene Fraction,  mL/hr at 07/26/17 1300 500 mg at 07/26/17 1300    propofol (DIPRIVAN) infusion  5-50 mcg/kg/min IntraVENous TITRATE JanuaryNatanael DALTON PA-C   Stopped at 07/24/17 1023    fentaNYL citrate (PF) injection  mcg   mcg IntraVENous Q4H PRN Katherene Fraction, NP   100 mcg at 07/25/17 2300    hydroxypropyl methylcellulose (GONAK) 2.5 % ophthalmic solution 1 Drop  1 Drop Both Eyes Q12H Ivan Ybarra MD   1 Drop at 07/26/17 1300    glucose chewable tablet 16 g  4 Tab Oral PRN Ivan Ybarra MD        glucagon (GLUCAGEN) injection 1 mg  1 mg IntraMUSCular PRN Deric Mcwilliams MD        dextrose (D50W) injection syrg 12.5-25 g  25-50 mL IntraVENous PRN Deric Mcwilliams MD   12.5 g at 07/20/17 1314    pantoprazole (PROTONIX) 40 mg in sodium chloride 0.9 % 10 mL injection  40 mg IntraVENous DAILY Deric Mcwilliams MD   40 mg at 07/26/17 0849       Vital signs:    Visit Vitals    /83    Pulse 94    Temp (!) 100.5 °F (38.1 °C)    Resp (!) 37    Ht 6' 1\" (1.854 m)    Wt 98.4 kg (217 lb)    SpO2 97%    BMI 28.63 kg/m2       Review of Systems:   Could not be obtained from the patient because of the medical status. Patient Active Problem List   Diagnosis Code    Hyperlipidemia E78.5    DM type 2 (diabetes mellitus, type 2) ([facility] E11.9    Essential hypertension, benign I10    Ventricular tachycardia (HCC) I47.2    Congestive heart failure (HCC) I50.9    Other and unspecified angina pectoris I20.9    Chronic kidney disease, stage III (moderate) N18.3    Type 2 diabetes mellitus without complication (HCC) N39.4    SBO (small bowel obstruction) ([facility] K56.69    Type 2 diabetes mellitus with stage 2 chronic kidney disease (HCC) E11.22, N18.2    Generalized ischemic myocardial dysfunction I25.5    Automatic implantable cardioverter-defibrillator in situ Z95.810    Hypertension I10    Diabetes mellitus type 1A ([facility] E10.9    Atrial paroxysmal tachycardia (HCC) I47.1    CKD (chronic kidney disease) stage 3, GFR 30-59 ml/min N18.3    Diabetes (HCC) C18.1    Systolic heart failure (HCC) I50.20    AICD (automatic cardioverter/defibrillator) present Z95.810    Ischemic cardiomyopathy I25.5    MI [facility] infarction) [facility] I21.3    Apical mural thrombus [id number]    Cardiac arrest (HCC) I46.9         Objective: The patient is deeply comatose, on the ventilator. He's noticed to have some spontaneous twitching of the face, and body at this time, which clearly increased with external stimulation. Cranial Nerves: II  Visual fields are not testable. Both pupils are 2 mm and sluggishly reactive to light. III, IV, VI  Extraocular movements are intact to doll's eye's manuver. There is no nystagmus. V  corneal responses are present. VII  Face is symmetrical.  VIII - Hearing is not testable. IX, X, XII  Palate is symmetrical.   Motor: The patient has increased myoclonus to pain, but no withdrawal. Tone is normal. Reflexes are 2+ and symmetrical. Plantars are up going. Gait is could not be tested. CBC:   Lab Results   Component Value Date/Time    WBC 10.0 07/26/2017 04:00 AM    RBC 2.66 07/26/2017 04:00 AM    HGB 7.3 07/26/2017 04:00 AM    HCT 22.1 07/26/2017 04:00 AM    PLATELET 129 51/54/5274 04:00 AM     BMP:   Lab Results   Component Value Date/Time    Glucose 204 07/26/2017 04:00 AM    Sodium 146 07/26/2017 04:00 AM    Potassium 4.5 07/26/2017 04:00 AM    Chloride 117 07/26/2017 04:00 AM    CO2 21 07/26/2017 04:00 AM    BUN 69 07/26/2017 04:00 AM    Creatinine 3.85 07/26/2017 04:00 AM    Calcium 8.2 07/26/2017 04:00 AM     CMP:   Lab Results   Component Value Date/Time    Glucose 204 07/26/2017 04:00 AM    Sodium 146 07/26/2017 04:00 AM    Potassium 4.5 07/26/2017 04:00 AM    Chloride 117 07/26/2017 04:00 AM    CO2 21 07/26/2017 04:00 AM    BUN 69 07/26/2017 04:00 AM    Creatinine 3.85 07/26/2017 04:00 AM    Calcium 8.2 07/26/2017 04:00 AM    Anion gap 8 07/26/2017 04:00 AM    BUN/Creatinine ratio 18 07/26/2017 04:00 AM    Alk.  phosphatase 69 07/26/2017 04:00 AM    Protein, total 6.2 07/26/2017 04:00 AM    Albumin 1.7 07/26/2017 04:00 AM    Globulin 4.5 07/26/2017 04:00 AM    A-G Ratio 0.4 07/26/2017 04:00 AM     Coagulation:   Lab Results   Component Value Date/Time    Prothrombin time 19.1 07/26/2017 04:00 AM    INR 1.7 07/26/2017 04:00 AM    aPTT 79.0 07/21/2017 06:20 AM     Cardiac markers:   Lab Results   Component Value Date/Time     07/20/2017 03:59 AM    CK-MB Index 5.9 07/20/2017 03:59 AM     Assessment:  Now day 7 post code encephalopathy with post arrest myoclonus in this patient who has risk factors including cardiac arrest.    Plan:  Now off sedation he continues to have a slight bit of myoclonus, but less apparently than earlier in his course. Poor prognosis with his continued myoclonus. Continue Keppra. Will add Depacon to try to control the myoclonus. Will follow. Sincerely,        Meera Long.  Анна Aggarwal M.D.

## 2017-07-26 NOTE — PROGRESS NOTES
RENAL DAILY PROGRESS NOTE    Impression & Plan:   IMPRESSION:   · Acute kidney injury , suspect ATN for hypotension, sepsis , worsening of renal failure   · Metabolic acidosis , stable   · Respiratory failure, intubated,   · S/p cardiac arrest  · Anoxic brain injury, severe per neurology colleague   PLAN:   · Mr. Ilana Savage has severe neurological injury, prognosis remain poor. In my opinion I would not recommend dialysis given current condition. will be available for any question or concern from medical as well as family situation. · Will follow periphery. · Need goal of care discussion. · Continue supportive care per ICU colleague. · Adjust all meds per current renal function status. Subjective:     72y M with NUSRAT  Complaint:   Overnight events noted  Remain on ICU, no improvement in mental status or hemodynamics.     Current Facility-Administered Medications   Medication Dose Route Frequency    piperacillin-tazobactam (ZOSYN) 3.375 g in 0.9% sodium chloride (MBP/ADV) 100 mL MBP  3.375 g IntraVENous Q6H    amiodarone (CORDARONE) tablet 200 mg  200 mg Per NG tube BID    ELECTROLYTE REPLACEMENT PROTOCOL  1 Each Other PRN    ELECTROLYTE REPLACEMENT PROTOCOL  1 Each Other PRN    ELECTROLYTE REPLACEMENT PROTOCOL  1 Each Other PRN    acetaminophen (TYLENOL) solution 1,000 mg  1,000 mg Per NG tube Q6H PRN    LORazepam (ATIVAN) injection 2-4 mg  2-4 mg IntraVENous Q2H PRN    insulin lispro (HUMALOG) injection   SubCUTAneous Q6H    levETIRAcetam (KEPPRA) 500 mg in 100 ml IVPB  500 mg IntraVENous Q12H    propofol (DIPRIVAN) infusion  5-50 mcg/kg/min IntraVENous TITRATE    fentaNYL citrate (PF) injection  mcg   mcg IntraVENous Q4H PRN    hydroxypropyl methylcellulose (GONAK) 2.5 % ophthalmic solution 1 Drop  1 Drop Both Eyes Q12H    glucose chewable tablet 16 g  4 Tab Oral PRN    glucagon (GLUCAGEN) injection 1 mg  1 mg IntraMUSCular PRN    dextrose (D50W) injection syrg 12.5-25 g  25-50 mL IntraVENous PRN    pantoprazole (PROTONIX) 40 mg in sodium chloride 0.9 % 10 mL injection  40 mg IntraVENous DAILY       Review of Symptoms:  As above   Objective:     Patient Vitals for the past 24 hrs:   Temp Pulse Resp BP SpO2   07/26/17 1100 - 84 (!) 41 132/75 98 %   07/26/17 1000 - 69 14 125/59 99 %   07/26/17 0912 - 78 29 - 98 %   07/26/17 0900 - 72 19 124/56 98 %   07/26/17 0800 (!) 100.5 °F (38.1 °C) 65 27 120/80 99 %   07/26/17 0700 - 64 24 125/89 99 %   07/26/17 0600 - 60 15 111/54 99 %   07/26/17 0500 - 64 27 105/59 99 %   07/26/17 0400 98.7 °F (37.1 °C) 61 14 110/52 99 %   07/26/17 0342 - 65 18 - 99 %   07/26/17 0300 - 60 15 109/51 99 %   07/26/17 0200 - 60 23 120/54 99 %   07/26/17 0100 - 60 16 110/57 99 %   07/26/17 0014 - 60 18 - 99 %   07/26/17 0000 99 °F (37.2 °C) 60 17 118/88 100 %   07/25/17 2300 - 60 20 114/51 100 %   07/25/17 2200 - 71 (!) 32 120/62 97 %   07/25/17 2100 - 60 20 117/60 100 %   07/25/17 2000 98.9 °F (37.2 °C) 65 26 120/60 99 %   07/25/17 1956 - 60 16 - 100 %   07/25/17 1900 - 64 23 115/55 100 %   07/25/17 1800 - 70 20 110/55 100 %   07/25/17 1700 - 60 25 107/54 100 %   07/25/17 1607 - 76 - - -   07/25/17 1600 99.1 °F (37.3 °C) 70 22 110/53 98 %   07/25/17 1552 99.1 °F (37.3 °C) - - - -   07/25/17 1500 - 60 23 116/55 99 %   07/25/17 1400 - 69 20 111/79 99 %   07/25/17 1300 - 70 22 105/55 99 %   07/25/17 1200 99.4 °F (37.4 °C) 72 22 (!) 87/58 98 %   07/25/17 1153 - 70 30 - 98 %        Weight change:      07/24 1901 - 07/26 0700  In: 5388.2 [I.V.:2698.2]  Out: 2325 [Urine:2325]    Intake/Output Summary (Last 24 hours) at 07/26/17 1123  Last data filed at 07/26/17 0900   Gross per 24 hour   Intake             4061 ml   Output             2125 ml   Net             1936 ml     Physical Exam:   General: intubated, tachypnea    HEENT sclera anicteric,   CVS: S1S2 heard,  no rub  RS: + air entry b/l,   Abd: Soft, Non tender,  Neuro: on sedation   Extrm: + edema, no cyanosis, clubbing   Skin: no visible  Rash  Musculoskeletal: No gross joints or bone deformities       Data Review:     LABS:   Hematology:   Recent Labs      07/26/17   0400  07/25/17   0400  07/24/17   0330   WBC  10.0  8.2  7.3   HGB  7.3*  8.0*  7.6*   HCT  22.1*  24.8*  22.8*     Chemistry:   Recent Labs      07/26/17   0400  07/25/17   0400  07/24/17   0330   BUN  69*  67*  65*   CREA  3.85*  4.13*  4.14*   CA  8.2*  7.9*  7.4*   ALB  1.7*  1.7*  1.7*   K  4.5  4.4  4.1   NA  146*  143  141   CL  117*  113*  111*   CO2  21  22  21   PHOS  3.5  4.3  3.6   GLU  204*  244*  220*              Procedures/imaging: see electronic medical records for all procedures, Xrays and details which were not copied into this note but were reviewed prior to creation of Plan          Assessment & Plan:     As above         Mora Dawson MD  7/26/2017  12:05 PM

## 2017-07-26 NOTE — PROGRESS NOTES
Hospitalist Progress Note    Patient: Ehsan Jimenez Age: 74 y.o. : 1950 MR#: [id number] SSN: [id number]  Date/Time: 2017 12:55 PM    Subjective:     Family is still in discussion for compassionate extubation. No overnight event. He remains unresponsive with myoclonus, worsened today. Patient's EEG did not have seizure activity, but generalized slowing waves. Neurology re-iterated to family of poor prognosis wit continued myoclonus. Fever this morning.  Cr improves slightly, Na is up, INR improves slightly    ROS: Remains unresponsive, unable to have meaningful communication     Objective:   VS:   Visit Vitals    /84 (BP 1 Location: Right arm, BP Patient Position: At rest)    Pulse 82    Temp (!) 100.5 °F (38.1 °C)    Resp (!) 37    Ht 6' 1\" (1.854 m)    Wt 98.4 kg (217 lb)    SpO2 98%    BMI 28.63 kg/m2      Tmax/24hrs: Temp (24hrs), Av.4 °F (37.4 °C), Min:98.7 °F (37.1 °C), Max:100.5 °F (38.1 °C)      Intake/Output Summary (Last 24 hours) at 17 1255  Last data filed at 17 1200   Gross per 24 hour   Intake           4184.3 ml   Output             2050 ml   Net           2134.3 ml       General: Unresponsive, generalized myoclonus jerking  HEENT: Pin point pupil and not responding to light, NT tube in tact   Cardiovascular:  S1S2 regular, no rub/gallop  Pulmonary:  Air entry bilaterally  GI:  Soft, non tender, non distended   Extremities:  Mild pedal edema, distal pulses appreciated   Neuro: Unresponsive, comatose, with generalized myoclonus jerking     Additional:   Current Facility-Administered Medications   Medication Dose Route Frequency    piperacillin-tazobactam (ZOSYN) 3.375 g in 0.9% sodium chloride (MBP/ADV) 100 mL MBP  3.375 g IntraVENous Q6H    amiodarone (CORDARONE) tablet 200 mg  200 mg Per NG tube BID    ELECTROLYTE REPLACEMENT PROTOCOL  1 Each Other PRN    ELECTROLYTE REPLACEMENT PROTOCOL  1 Each Other PRN    ELECTROLYTE REPLACEMENT PROTOCOL  1 Each Other PRN    acetaminophen (TYLENOL) solution 1,000 mg  1,000 mg Per NG tube Q6H PRN    LORazepam (ATIVAN) injection 2-4 mg  2-4 mg IntraVENous Q2H PRN    insulin lispro (HUMALOG) injection   SubCUTAneous Q6H    levETIRAcetam (KEPPRA) 500 mg in 100 ml IVPB  500 mg IntraVENous Q12H    propofol (DIPRIVAN) infusion  5-50 mcg/kg/min IntraVENous TITRATE    fentaNYL citrate (PF) injection  mcg   mcg IntraVENous Q4H PRN    hydroxypropyl methylcellulose (GONAK) 2.5 % ophthalmic solution 1 Drop  1 Drop Both Eyes Q12H    glucose chewable tablet 16 g  4 Tab Oral PRN    glucagon (GLUCAGEN) injection 1 mg  1 mg IntraMUSCular PRN    dextrose (D50W) injection syrg 12.5-25 g  25-50 mL IntraVENous PRN    pantoprazole (PROTONIX) 40 mg in sodium chloride 0.9 % 10 mL injection  40 mg IntraVENous DAILY          Labs:    Recent Results (from the past 24 hour(s))   GLUCOSE, POC    Collection Time: 07/25/17  7:00 PM   Result Value Ref Range    Glucose (POC) 224 (H) 70 - 110 mg/dL   GLUCOSE, POC    Collection Time: 07/26/17  2:20 AM   Result Value Ref Range    Glucose (POC) 185 (H) 70 - 110 mg/dL   POC G3    Collection Time: 07/26/17  3:51 AM   Result Value Ref Range    Device: VENT      FIO2 (POC) 0.40 %    pH (POC) 7.430 7.35 - 7.45      pCO2 (POC) 30.2 (L) 35.0 - 45.0 MMHG    pO2 (POC) 80 80 - 100 MMHG    HCO3 (POC) 20.1 (L) 22 - 26 MMOL/L    sO2 (POC) 96 92 - 97 %    Base deficit (POC) 4 mmol/L    Mode ASSIST CONTROL      Set Rate 14 bpm    PEEP/CPAP (POC) 5.0 cmH2O    PIP (POC) 18      Allens test (POC) N/A      Inspiratory Time 1.00 sec    Total resp.  rate 21      Site DRAWN FROM ARTERIAL LINE      Specimen type (POC) ARTERIAL      Performed by Adeola Lee     Pressure control YES     CBC WITH AUTOMATED DIFF    Collection Time: 07/26/17  4:00 AM   Result Value Ref Range    WBC 10.0 4.6 - 13.2 K/uL    RBC 2.66 (L) 4.70 - 5.50 M/uL    HGB 7.3 (L) 13.0 - 16.0 g/dL    HCT 22.1 (L) 36.0 - 48.0 %    MCV 83.1 74.0 - 97.0 FL    MCH 27.4 24.0 - 34.0 PG    MCHC 33.0 31.0 - 37.0 g/dL    RDW 14.8 (H) 11.6 - 14.5 %    PLATELET 780 696 - 888 K/uL    MPV 10.7 9.2 - 11.8 FL    NEUTROPHILS 78 (H) 40 - 73 %    LYMPHOCYTES 9 (L) 21 - 52 %    MONOCYTES 10 3 - 10 %    EOSINOPHILS 3 0 - 5 %    BASOPHILS 0 0 - 2 %    ABS. NEUTROPHILS 7.8 1.8 - 8.0 K/UL    ABS. LYMPHOCYTES 0.9 0.9 - 3.6 K/UL    ABS. MONOCYTES 1.0 0.05 - 1.2 K/UL    ABS. EOSINOPHILS 0.3 0.0 - 0.4 K/UL    ABS. BASOPHILS 0.0 0.0 - 0.06 K/UL    DF AUTOMATED     METABOLIC PANEL, COMPREHENSIVE    Collection Time: 07/26/17  4:00 AM   Result Value Ref Range    Sodium 146 (H) 136 - 145 mmol/L    Potassium 4.5 3.5 - 5.5 mmol/L    Chloride 117 (H) 100 - 108 mmol/L    CO2 21 21 - 32 mmol/L    Anion gap 8 3.0 - 18 mmol/L    Glucose 204 (H) 74 - 99 mg/dL    BUN 69 (H) 7.0 - 18 MG/DL    Creatinine 3.85 (H) 0.6 - 1.3 MG/DL    BUN/Creatinine ratio 18 12 - 20      GFR est AA 19 (L) >60 ml/min/1.73m2    GFR est non-AA 16 (L) >60 ml/min/1.73m2    Calcium 8.2 (L) 8.5 - 10.1 MG/DL    Bilirubin, total 0.3 0.2 - 1.0 MG/DL    ALT (SGPT) 54 16 - 61 U/L    AST (SGOT) 44 (H) 15 - 37 U/L    Alk.  phosphatase 69 45 - 117 U/L    Protein, total 6.2 (L) 6.4 - 8.2 g/dL    Albumin 1.7 (L) 3.4 - 5.0 g/dL    Globulin 4.5 (H) 2.0 - 4.0 g/dL    A-G Ratio 0.4 (L) 0.8 - 1.7     PHOSPHORUS    Collection Time: 07/26/17  4:00 AM   Result Value Ref Range    Phosphorus 3.5 2.5 - 4.9 MG/DL   PROTHROMBIN TIME + INR    Collection Time: 07/26/17  4:00 AM   Result Value Ref Range    Prothrombin time 19.1 (H) 11.5 - 15.2 sec    INR 1.7 (H) 0.8 - 1.2     MAGNESIUM    Collection Time: 07/26/17  4:00 AM   Result Value Ref Range    Magnesium 2.6 1.6 - 2.6 mg/dL   GLUCOSE, POC    Collection Time: 07/26/17  5:40 AM   Result Value Ref Range    Glucose (POC) 231 (H) 70 - 110 mg/dL   EKG, 12 LEAD, SUBSEQUENT    Collection Time: 07/26/17  7:12 AM   Result Value Ref Range    Ventricular Rate 63 BPM    Atrial Rate 63 BPM    P-R Interval 178 ms    QRS Duration 110 ms    Q-T Interval 426 ms    QTC Calculation (Bezet) 435 ms    Calculated P Axis 88 degrees    Calculated R Axis 85 degrees    Calculated T Axis -21 degrees    Diagnosis       Normal sinus rhythm  Low voltage QRS  Anterolateral infarct , age undetermined  Abnormal ECG  When compared with ECG of 25-JUL-2017 07:40,  Sinus rhythm has replaced Electronic ventricular pacemaker     GLUCOSE, POC    Collection Time: 07/26/17 11:48 AM   Result Value Ref Range    Glucose (POC) 266 (H) 70 - 110 mg/dL     EEG:  ELECTROENCEPHALOGRAM INTERPRETATION: This is a  markedly abnormal recording due to the presence of marked  suppression of background activity. The patient had been on sedation,  however, so does not fit the requirement for electrocortical silence. Unvarying suppression of background activity without reactivity or poor  prognostic indicators for meaningful recovery. No epileptiform  discharges were noted. Chest Xray:  Impression:       1. Support tubes/devices, as above. 2. Stable enlargement of the cardiac silhouette. Probable mild pulmonary  vascular congestion. 3. Increased bibasilar hazy opacities, left greater than right, possibly  atelectasis versus infiltrate/edema. Small left effusion is not excluded. 2D echocardiogram:  SUMMARY:  Procedure information: This was a technically difficult study. Intravenous  contrast (Definity) was administered. Left ventricle: Systolic function was moderately to markedly reduced by  visual assessment. Ejection fraction was estimated to be 30 %. There was  severe hypokinesis of the mid-apical anterior, basal-mid inferoseptal,  entire inferior, apical septal, apical lateral, and apical wall(s). Wall  thickness was mildly to moderately increased. Right atrium: The atrium was mildly dilated. Mitral valve: There was mild regurgitation. Aorta, systemic arteries: The root exhibited mild dilatation.     All Micro Results     Procedure Component Value Units Date/Time    CULTURE, RESPIRATORY/SPUTUM/BRONCH Blinda Savant STAIN [[id number]]  (Abnormal)  (Susceptibility) Collected:  07/23/17 1755    Order Status:  Completed Specimen:  Sputum from Sputum,ET Suction Updated:  07/26/17 0923     Special Requests: NO SPECIAL REQUESTS        GRAM STAIN >25 WBC/lpf         <10 EPI/lpf         MUCUS PRESENT                 FEW GRAM POSITIVE COCCI IN PAIRS IN GROUPS      RARE GRAM POSITIVE RODS         RARE GRAM NEGATIVE RODS        Culture result:         MODERATE STAPHYLOCOCCUS AUREUS (A)              FEW ENTEROBACTER AEROGENES (A)      FEW PROTEUS MIRABILIS (A)                 FEW NORMAL RESPIRATORY RENARD    CULTURE, BLOOD [[id number]] Collected:  07/23/17 1755    Order Status:  Completed Specimen:  Blood from Blood Updated:  07/26/17 0749     Special Requests: NO SPECIAL REQUESTS        Culture result: NO GROWTH 3 DAYS       CULTURE, BLOOD [[id number]] Collected:  07/23/17 1915    Order Status:  Completed Specimen:  Blood from Blood Updated:  07/26/17 0749     Special Requests: NO SPECIAL REQUESTS        Culture result: NO GROWTH 3 DAYS       CULTURE, BLOOD [[id number]] Collected:  07/22/17 1600    Order Status:  Completed Specimen:  Blood from Blood Updated:  07/26/17 0749     Special Requests: NO SPECIAL REQUESTS        Culture result: NO GROWTH 4 DAYS       CULTURE, BLOOD [[id number]] Collected:  07/22/17 1557    Order Status:  Completed Specimen:  Blood from Blood Updated:  07/26/17 0749     Special Requests: NO SPECIAL REQUESTS        Culture result: NO GROWTH 4 DAYS       CULTURE, RESPIRATORY/SPUTUM/BRONCH W GRAM STAIN [[id number]]  (Abnormal)  (Susceptibility) Collected:  07/22/17 1615    Order Status:  Completed Specimen:  Sputum from Sputum,ET Suction Updated:  07/25/17 0903     Special Requests: NO SPECIAL REQUESTS        GRAM STAIN MODERATE WBC'S                 MODERATE GRAM POSITIVE COCCI IN PAIRS IN GROUPS      FEW GRAM NEGATIVE RODS        Culture result: MANY STAPHYLOCOCCUS AUREUS (A)              MODERATE ENTEROBACTER AEROGENES (A)              MODERATE PROTEUS MIRABILIS (A)              FEW NORMAL RESPIRATORY RENARD    CULTURE, BLOOD [[id number]] Collected:  07/19/17 1530    Order Status:  Completed Specimen:  Blood from Blood Updated:  07/25/17 0753     Special Requests: NO SPECIAL REQUESTS        Culture result: NO GROWTH 6 DAYS       CULTURE, URINE [[id number]] Collected:  07/22/17 1735    Order Status:  Completed Specimen:  Urine from [place] Specimen Updated:  07/24/17 0858     Special Requests: NO SPECIAL REQUESTS        Culture result: NO GROWTH 2 DAYS       CULTURE, URINE [[id number]] Collected:  07/20/17 0900    Order Status:  Completed Specimen:  [place] Specimen Updated:  07/22/17 0838     Special Requests: NO SPECIAL REQUESTS        Culture result: NO GROWTH 2 DAYS       CULTURE, BLOOD [[id number]]  (Abnormal)  (Susceptibility) Collected:  07/19/17 1530    Order Status:  Completed Specimen:  Blood from Blood Updated:  07/22/17 0708     Special Requests: NONE        GRAM STAIN         AEROBIC AND ANAEROBIC BOTTLES GRAM POSITIVE COCCI              SMEAR CALLED TO AND CORRECTLY REPEATED BY: TOYA RN CCU 1130 7/20 TO [facility]     Culture result:         ANAEROBIC BOTTLE STAPHYLOCOCCUS EPIDERMIDIS (A)              AEROBIC BOTTLE STAPHYLOCOCCUS LUGDUNENSIS (A)          Assessment/Plan:     1. Cardio:    A. Cardiac arrest: Pulseless VT, post AICD shock, off from hypothermia protocol, VERY POOR PROGNOSIS       - device interrogated, normal       - On amiodarone tablet     B.  CAD with recent troponin elevation, secondary to VT and shocks    C. History of VT with AICD, to schedule for VT ablation but cancelled due to apical thrombus    D. Ischemic cardiomyopathy, EF 30%, proBNP not elevated on admission    E.  Paroxysmal atrial fibrillation, in sinus rhythm     2. Resp:     A.   Ventilation dependent hypoxic respiratory failure, remains intubated and unable to wean off ventilation        - Vent cont, management per ICU protocol        - Awaiting for family decision today     B. Pneumonia with Staphylococcus, Enterobacter, Proteus         - Cont on Vanc/Zosyn, no grow on blood culture, follow up on repeat respiratory culture         - poor prognosis per Intensivist     3. Neuro:     A. Anoxic brain injury, with unresponsive, Poor Prognosis,        - open eyes today on sternal rub but no purposeful response or follow commands       - EEG with slowing waves, no seizure      B. Persistent myoclonus, seizure       - on Keppra, no seizure on EEG        - poor prognosis per Neurology     4. :     A. NUSRAT on CKD III, ATN      - NOT A CANDIDATE FOR HEMODIALYSIS     5.  GI:     A. Liver shock wit transaminitis, improving. INR remains elevated       B. NPO for today    6. ID:     A. Contaminated blood culture, No growth on repeat      B. Urine with gram negative rods and gram positive cocci       - ON VANCO/ZOSYN     C. Pneumonia with Staph, Enterobacter, Proteus, continue on Vanco/Zosyn         - no sepsis, sepsis rule out    7. Endo:     A. Hyperglycemia with DMT2. On ISS per ICU protocol    8. Heme/Onc:      A. Hypercoagulable state, INR remains elevated. Off coumadin     Disposition: Family members are still struggling to let him go, unable to make decision for compassionate extubation   yesterday due to opening of his eyes. No meaningful response today. Family is still in discussion for further care.      Additional Notes:    Time spent 40 minutes    Case discussed with:  []Patient  [x]Family  [x]Nursing  [x]Case Management  DVT Prophylaxis:  []Lovenox  []Hep SQ  [x]SCDs  []Coumadin   []On Heparin gtt    Signed By: Eriberto Viramontes MD     July 26, 2017 12:55 PM

## 2017-07-26 NOTE — ROUTINE PROCESS
Bedside and Verbal shift change report given to Farideh Hernández RN (oncoming nurse) by Anselmo Carrion RN (offgoing nurse). Report included the following information SBAR, Kardex, MAR and Recent Results.     SITUATION:    Code Status: Full Code   Reason for Admission: Cardiac arrest [facility])    [facility] day: 6   Problem List:       Hospital Problems  Date Reviewed: 7/20/2017          Codes Class Noted POA    Cardiac arrest [facility]) ICD-10-CM: I46.9  ICD-9-CM: 427.5  7/19/2017 Yes        Ischemic cardiomyopathy ICD-10-CM: I25.5  ICD-9-CM: 414.8  Unknown Yes        AICD (automatic cardioverter/defibrillator) present ICD-10-CM: Z95.810  ICD-9-CM: V45.02  Unknown Yes        Type 2 diabetes mellitus with stage 2 chronic kidney disease ([facility] (Chronic) ICD-10-CM: E11.22, N18.2  ICD-9-CM: 250.40, 585.2  7/28/2016 Yes        Hyperlipidemia (Chronic) ICD-10-CM: E78.5  ICD-9-CM: 272.4  Unknown Yes        DM type 2 (diabetes mellitus, type 2) ([facility] (Chronic) ICD-10-CM: E11.9  ICD-9-CM: 250.00  Unknown Yes        Essential hypertension, benign (Chronic) ICD-10-CM: I10  ICD-9-CM: 401.1  Unknown Yes        Ventricular tachycardia ([facility] (Chronic) ICD-10-CM: I47.2  ICD-9-CM: 427.1  Unknown Yes    Overview Signed 12/29/2016  7:35 PM by HERIBERTO Adamson     Overview:   S/p AICD  [facility] study             Congestive heart failure (HCC) (Chronic) ICD-10-CM: I50.9  ICD-9-CM: 428.0  Unknown Yes        Atrial paroxysmal tachycardia ([facility] ICD-10-CM: I47.1  ICD-9-CM: 427.0  6/7/2010 Yes              BACKGROUND:    Past Medical History:   Past Medical History:   Diagnosis Date    AICD (automatic cardioverter/defibrillator) present     Aneurysm of left ventricle of heart     with vt    Apical mural thrombus (HCC)     CKD (chronic kidney disease) stage 3, GFR 30-59 ml/min     Diabetes ([facility]     IDDM    Essential hypertension, benign     Ischemic cardiomyopathy     MI (myocardial infarction) ([facility]     Mixed hyperlipidemia     Other and unspecified angina pectoris (HCC)     Paroxysmal ventricular tachycardia (HCC)     SBO (small bowel obstruction) ([facility])     Systolic heart failure ([address]          Patient taking anticoagulants: No     ASSESSMENT:    Changes in Assessment Throughout Shift: Myoclonus continues with external stimuli. Strong cough reflex. Pupils briskly reactive to light. Eyes flutter and crack open with external stimuli. Does not focus or track with eyes. Does not follow commands. EEG done. Grimacing noted with deep nailbed pressure during EEG. Amiodarone drip stopped and switched to oral dosing via NGT. Tube feeding advanced as tolerated.      Patient has Central Line: No     Patient has Santoyo Cath: Yes;  Reasons if yes: Strict intake and output      Last Vitals:     Vitals:    07/25/17 1700 07/25/17 1800 07/25/17 1900 07/25/17 1956   BP: 107/54 110/55 115/55    Pulse: 60 70 64 60   Resp: 25 20 23 16   Temp:       SpO2: 100% 100% 100% 100%   Weight:       Height:            IV and DRAINS (will only show if present)   [REMOVED] Peripheral IV 07/19/17 Left;Upper Arm-Site Assessment: Clean, dry, & intact  Arterial Line 07/19/17 Right Radial artery-Site Assessment: Clean, dry, & intact  [REMOVED] Triple Lumen 07/19/17 Right Femoral-Site Assessment: Clean, dry, & intact  Peripheral IV 07/19/17 Right Antecubital-Site Assessment: Clean, dry, & intact  [REMOVED] Peripheral IV 07/19/17 Right;Upper Arm-Site Assessment: Clean, dry, & intact  Orogastric Tube 07/19/17-Site Assessment: Clean, dry, & intact  Airway - Continuous Aspiration of Subglottic Secretions (FEDERICO) Tube 07/19/17 Oral-Site Assessment: Clean, dry, & intact  Peripheral IV 07/21/17 Left Hand-Site Assessment: Clean, dry, & intact  Peripheral IV 07/25/17 Left;Upper Arm-Site Assessment: Clean, dry, & intact     WOUND (if present)   Wound Type:  Skin tear to left elbow   Dressing present Dressing Present : Yes, Intact, not due to be changed   Wound Concerns/Notes: none     PAIN    Pain Assessment                   Patient Stated Pain Goal: Unable to verbalize/indicatate pain  o Interventions for Pain:  PRN IV Fentanyl  o Intervention effective: yes  o Time of last intervention: 1920   o Reassessment Completed: yes      Last 3 Weights:  Last 3 Recorded Weights in this Encounter    07/23/17 0000 07/24/17 0200 07/25/17 0500   Weight: 107 kg (235 lb 12.8 oz) 109 kg (240 lb 4.8 oz) 104.1 kg (229 lb 8 oz)     Weight change: -4.9 kg (-10 lb 12.8 oz)     INTAKE/OUPUT    Current Shift:      Last three shifts: 07/24 0701 - 07/25 1900  In: 4606.5 [I.V.:2886.5]  Out: 7143 [Urine:1990]     LAB RESULTS     Recent Labs      07/25/17   0400  07/24/17   0330  07/23/17   0439   WBC  8.2  7.3  8.5   HGB  8.0*  7.6*  8.7*   HCT  24.8*  22.8*  26.1*   PLT  172  164  173        Recent Labs      07/25/17   0400  07/24/17   0330  07/23/17   0439   NA  143  141  139   K  4.4  4.1  4.8   GLU  244*  220*  213*   BUN  67*  65*  53*   CREA  4.13*  4.14*  4.26*   CA  7.9*  7.4*  7.8*   MG  2.4  2.2  2.1   INR  2.0*  2.0*  1.5*       RECOMMENDATIONS AND DISCHARGE PLANNING     1. Pending tests/procedures/ Plan of Care or Other Needs: Monitor neuro status; SBT as tolerated     2. Discharge plan for patient and Needs/Barriers: TBD    3. Estimated Discharge Date: TBD; Posted on Whiteboard in Patients Room: Yes      4. The patient's care plan was reviewed with the oncoming nurse. \"HEALS\" SAFETY CHECK      Fall Risk    Total Score: 4    Safety Measures: Safety Measures: Bed/Chair-Wheels locked, Bed in low position, Emergency bedside equipment, Fall prevention (comment), Nurse at bedside, Side rails X 3, Seizure precaution    A safety check occurred in the patient's room between off going nurse and oncoming nurse listed above.     The safety check included the below items  Area Items   H  High Alert Medications - Verify all high alert medication drips (heparin, PCA, etc.)   E  Equipment - Suction is set up for ALL patients (with ok)  - Red plugs utilized for all equipment (IV pumps, etc.)  - WOWs wiped down at end of shift.  - Room stocked with oxygen, suction, and other unit-specific supplies   A  Alarms - Bed alarm is set for fall risk patients  - Ensure chair alarm is in place and activated if patient is up in a chair   L  Lines - Check IV for any infiltration  - Santoyo bag is empty if patient has a Santoyo   - Tubing and IV bags are labeled   S  Safety   - Room is clean, patient is clean, and equipment is clean. - Hallways are clear from equipment besides carts. - Fall bracelet on for fall risk patients  - Ensure room is clear and free of clutter  - Suction is set up for ALL patients (with ok)  - Hallways are clear from equipment besides carts.    - Isolation precautions followed, supplies available outside room, sign posted     Love Brad Banegas RN

## 2017-07-26 NOTE — ROUTINE PROCESS
Received pt in bed  . Pt does not response to voice but does response to pain   Eyes open but have a fixed stare at time without  tracking     Mobility--bedrest --at time a jerking and twitching  motion of eyes and upper and lower extremities    Respiratory-- vent setting mode A/C R 14 FIO2 40% Peep 5    GI-OG to TF Glycermia at goal 50cc/hr    Gu--Santoyo with liliane colored urine tinged with pink    Will continue to monitor    2100  Many family members at bedside. 2140 Dr. Mcmillan Arm states family okay with making pt DNR and from here on only treatment plan is meds for sedations, pain and seizure only. Family okay with extubation. Bedside and Verbal shift change report given to Aidee Palencia (oncoming nurse) by Adela Whelan RN   (offgoing nurse). Report included the following information SBAR, Kardex, Intake/Output and MAR.

## 2017-07-26 NOTE — PROGRESS NOTES
1400-sedatives dc'd to assess patient's neuro status. 1700-continues to have myoclonic jerking additional seizure medication added sedatives remain discontinued to assess neuro status. 1800-Received phone call from brother Tyler Gregory, states the family plans to withdraw care at 9pm Dr. Sj renner called and notified of family's wishes and states he will come in at [phone] to facilitate.

## 2017-07-26 NOTE — PROGRESS NOTES
NUTRITION    Nutrition Screen      RECOMMENDATIONS / PLAN:     - Continue tube feeding of Glucerna 1.5 at goal rate of 60 mL/hr.  - Increase water flushes to 200 mL q 6 hours. - Discontinue IV fluid. - Continue RD inpatient monitoring and evaluation. Goal Regimen: Glucerna 1.5 at 60 mL/hr + 200 mL q 6 hour water flushes to provide: 2160 kcal, 119 gm protein, 108 gm fat, 192 gm CHO, 23 gm fiber, 1093 mL free water, 1893 mL total water, 100% RDIs     NUTRITION INTERVENTIONS & DIAGNOSIS:     [x] IV fluid: NS at 50 mL/hr- discontinue     [x] Enteral nutrition support: continue   [x] Vitamin/mineral supplementation: electrolyte replacement protocol  [x] Coordination of care: interdisciplinary rounds, discussed with MD     Nutrition Diagnosis: Inadequate oral intake related to inability to tolerate po as evidenced by NPO. ASSESSMENT:      7/26: Tolerating feeding at goal. Sodium elevated, water flushes/IV fluid adjusted and discussed with MD.   7/25: Feeding resumed at 10 mL/hr at 12 pm yesterday and advancing gradually. Plan for compassionate extubation today. 7/24: Renal function worsening and poor UOP, Crea up to 4.14, continue IV fluid per Nephrology. Feedings up to 60 mL/hr then held this morning for 560 mL residual. Possible compassionate extubation tomorrow. 7/22: Tolerating feeding advancement. Sodium WNL, developed trace edema, minimal UOP.  7/21: Pt anticipated to reach goal rewarming temperature today, will start feedings once protocol completed per MD.   7/20: Pt intubated on hypothermia protocol- to start rewarming at 15:00 today. OGT to continuous suction.      EN infusion adequate to meet patients estimated nutritional needs:   [x] Yes     [] No   [] Unable to determine at this time    Tube Feeding: Glucerna 1.5 at 60 mL/hr via OGT   Water Flushes: 200 mL q 6 hours   Residuals: 40 mL    Diet: DIET NPO  DIET TUBE FEEDING      Food Allergies: NKFA  Current Appetite:   [] Good     [] Fair     [] Poor     [x] Other: NPO  Appetite/meal intake prior to admission:   [] Good     [] Fair     [] Poor     [x] Other: unknown   Feeding Limitations:  [] Swallowing difficulty    [] Chewing difficulty    [x] Other: intubated   Current Meal Intake: No data found. BM: 7/25, loose  Skin Integrity: WDL  Edema: 1+ generalized   Pertinent Medications: Reviewed    Recent Labs      07/26/17   0400  07/25/17   0400  07/24/17   0330   NA  146*  143  141   K  4.5  4.4  4.1   CL  117*  113*  111*   CO2  21  22  21   GLU  204*  244*  220*   BUN  69*  67*  65*   CREA  3.85*  4.13*  4.14*   CA  8.2*  7.9*  7.4*   MG  2.6  2.4  2.2   PHOS  3.5  4.3  3.6   ALB  1.7*  1.7*  1.7*   SGOT  44*  32  38*   ALT  54  65*  85*       Intake/Output Summary (Last 24 hours) at 07/26/17 1003  Last data filed at 07/26/17 0900   Gross per 24 hour   Intake           4257.7 ml   Output             2225 ml   Net           2032.7 ml       Anthropometrics:  Ht Readings from Last 1 Encounters:   07/19/17 6' 1\" (1.854 m)     Last 3 Recorded Weights in this Encounter    07/23/17 0000 07/24/17 0200 07/25/17 0500   Weight: 107 kg (235 lb 12.8 oz) 109 kg (240 lb 4.8 oz) 104.1 kg (229 lb 8 oz)     Body mass index is 30.28 kg/(m^2).           Weight History:   Weight Metrics 7/25/2017 12/29/2016 8/9/2016 7/28/2016 3/31/2016 3/17/2016 2/5/2016   Weight 229 lb 8 oz 215 lb 219 lb 215 lb 218 lb 218 lb 228 lb   BMI 30.28 kg/m2 28.37 kg/m2 29.7 kg/m2 29.15 kg/m2 29.56 kg/m2 29.56 kg/m2 30.92 kg/m2        Admitting Diagnosis: Cardiac arrest ([facility] [address]  Pertinent PMHx: CKD, DM, HF    Education Needs:        [x] None identified  [] Identified - Not appropriate at this time  []  Identified and addressed - refer to education log  Learning Limitations:   [] None identified  [x] Identified: intubated     Cultural, Druze & ethnic food preferences:  [x] None identified    [] Identified and addressed     ESTIMATED NUTRITION NEEDS:     Calories: 6729-8799 kcal ([id number]) based on  [x] Actual BW 96 kg     [] IBW   Protein: 115-192 gm (1.2-2 gm/kg) based on  [x] Actual BW      [] IBW   Fluid: 1 mL/kcal     MONITORING & EVALUATION:     Nutrition Goal(s):  1. Nutritional needs will be met through adequate oral intake or nutrition support within the next 7 days. Outcome:  [x] Met/Ongoing    []  Not Met/Progressing    [] New/Initial Goal   2. Provide nutrition intervention as appropriate with goals of care for the next 5-7 days.  Outcome:  [x] Met/Ongoing    []  Not Met    [] New/Initial Goal     Monitoring:   [x] EN tolerance   [x] EN infusion   [] Supplement intake   [] GI symptoms/ability to tolerate po diet   [x] Respiratory status   [x] Plan of care      Previous Recommendations (for follow-up assessments only):     [x]   Implemented       []   Not Implemented (RD to address)     [] No Recommendation Made     Discharge Planning: pending ability to tolerate po and plan of care  [x] Participated in care planning, discharge planning, & interdisciplinary rounds as appropriate      Sherin Blum, [address]    Pager: [phone]

## 2017-07-26 NOTE — PROGRESS NOTES
Subjective:  Symptoms:  Stable. Diet:  NPO. Patient remains unresponsive on vent with myoclonic jerking  No new events overnight. Temp:  [98.5 °F (36.9 °C)-101.4 °F (38.6 °C)]   Pulse (Heart Rate):  [02-46]   BP: ()/(42-89)   Resp Rate:  [14-42]   O2 Sat (%):  [88 %-100 %]   Weight:  [104.1 kg (229 lb 8 oz)-109 kg (240 lb 4.8 oz)]      07/24 1901 - 07/26 0700  In: 5388.2 [I.V.:2698.2]  Out: 4161 [Urine:2325]      Objective:  General Appearance:  Comfortable, ill-appearing and in no acute distress. Vital signs: (most recent): Blood pressure 124/56, pulse 78, temperature (!) 100.5 °F (38.1 °C), resp. rate 29, height 6' 1\" (1.854 m), weight 104.1 kg (229 lb 8 oz), SpO2 98 %. Output: Producing urine and producing stool. Lungs:  Normal respiratory rate and normal effort. Breath sounds clear to auscultation. Heart: Normal rate. Regular rhythm. Neurological: (Unresponsive). Skin:  Dry and cool. Abdomen: Abdomen is soft. Hypoactive bowel sounds. Recent Results (from the past 24 hour(s))   GLUCOSE, POC    Collection Time: 07/25/17 12:12 PM   Result Value Ref Range    Glucose (POC) 119 (H) 70 - 110 mg/dL   GLUCOSE, POC    Collection Time: 07/25/17  7:00 PM   Result Value Ref Range    Glucose (POC) 224 (H) 70 - 110 mg/dL   GLUCOSE, POC    Collection Time: 07/26/17  2:20 AM   Result Value Ref Range    Glucose (POC) 185 (H) 70 - 110 mg/dL   POC G3    Collection Time: 07/26/17  3:51 AM   Result Value Ref Range    Device: VENT      FIO2 (POC) 0.40 %    pH (POC) 7.430 7.35 - 7.45      pCO2 (POC) 30.2 (L) 35.0 - 45.0 MMHG    pO2 (POC) 80 80 - 100 MMHG    HCO3 (POC) 20.1 (L) 22 - 26 MMOL/L    sO2 (POC) 96 92 - 97 %    Base deficit (POC) 4 mmol/L    Mode ASSIST CONTROL      Set Rate 14 bpm    PEEP/CPAP (POC) 5.0 cmH2O    PIP (POC) 18      Allens test (POC) N/A      Inspiratory Time 1.00 sec    Total resp.  rate 21      Site DRAWN FROM ARTERIAL LINE      Specimen type (POC) ARTERIAL Performed by [facility]     Pressure control YES     CBC WITH AUTOMATED DIFF    Collection Time: 07/26/17  4:00 AM   Result Value Ref Range    WBC 10.0 4.6 - 13.2 K/uL    RBC 2.66 (L) 4.70 - 5.50 M/uL    HGB 7.3 (L) 13.0 - 16.0 g/dL    HCT 22.1 (L) 36.0 - 48.0 %    MCV 83.1 74.0 - 97.0 FL    MCH 27.4 24.0 - 34.0 PG    MCHC 33.0 31.0 - 37.0 g/dL    RDW 14.8 (H) 11.6 - 14.5 %    PLATELET 998 867 - 546 K/uL    MPV 10.7 9.2 - 11.8 FL    NEUTROPHILS 78 (H) 40 - 73 %    LYMPHOCYTES 9 (L) 21 - 52 %    MONOCYTES 10 3 - 10 %    EOSINOPHILS 3 0 - 5 %    BASOPHILS 0 0 - 2 %    ABS. NEUTROPHILS 7.8 1.8 - 8.0 K/UL    ABS. LYMPHOCYTES 0.9 0.9 - 3.6 K/UL    ABS. MONOCYTES 1.0 0.05 - 1.2 K/UL    ABS. EOSINOPHILS 0.3 0.0 - 0.4 K/UL    ABS. BASOPHILS 0.0 0.0 - 0.06 K/UL    DF AUTOMATED     METABOLIC PANEL, COMPREHENSIVE    Collection Time: 07/26/17  4:00 AM   Result Value Ref Range    Sodium 146 (H) 136 - 145 mmol/L    Potassium 4.5 3.5 - 5.5 mmol/L    Chloride 117 (H) 100 - 108 mmol/L    CO2 21 21 - 32 mmol/L    Anion gap 8 3.0 - 18 mmol/L    Glucose 204 (H) 74 - 99 mg/dL    BUN 69 (H) 7.0 - 18 MG/DL    Creatinine 3.85 (H) 0.6 - 1.3 MG/DL    BUN/Creatinine ratio 18 12 - 20      GFR est AA 19 (L) >60 ml/min/1.73m2    GFR est non-AA 16 (L) >60 ml/min/1.73m2    Calcium 8.2 (L) 8.5 - 10.1 MG/DL    Bilirubin, total 0.3 0.2 - 1.0 MG/DL    ALT (SGPT) 54 16 - 61 U/L    AST (SGOT) 44 (H) 15 - 37 U/L    Alk.  phosphatase 69 45 - 117 U/L    Protein, total 6.2 (L) 6.4 - 8.2 g/dL    Albumin 1.7 (L) 3.4 - 5.0 g/dL    Globulin 4.5 (H) 2.0 - 4.0 g/dL    A-G Ratio 0.4 (L) 0.8 - 1.7     PHOSPHORUS    Collection Time: 07/26/17  4:00 AM   Result Value Ref Range    Phosphorus 3.5 2.5 - 4.9 MG/DL   PROTHROMBIN TIME + INR    Collection Time: 07/26/17  4:00 AM   Result Value Ref Range    Prothrombin time 19.1 (H) 11.5 - 15.2 sec    INR 1.7 (H) 0.8 - 1.2     MAGNESIUM    Collection Time: 07/26/17  4:00 AM   Result Value Ref Range    Magnesium 2.6 1.6 - 2.6 mg/dL   GLUCOSE, POC    Collection Time: 07/26/17  5:40 AM   Result Value Ref Range    Glucose (POC) 231 (H) 70 - 110 mg/dL   EKG, 12 LEAD, SUBSEQUENT    Collection Time: 07/26/17  7:12 AM   Result Value Ref Range    Ventricular Rate 63 BPM    Atrial Rate 63 BPM    P-R Interval 178 ms    QRS Duration 110 ms    Q-T Interval 426 ms    QTC Calculation (Bezet) 435 ms    Calculated P Axis 88 degrees    Calculated R Axis 85 degrees    Calculated T Axis -21 degrees    Diagnosis       Normal sinus rhythm  Low voltage QRS  Anterolateral infarct , age undetermined  Abnormal ECG  When compared with ECG of 25-JUL-2017 07:40,  Sinus rhythm has replaced Electronic ventricular pacemaker           Active Problems:    Hyperlipidemia ()      DM type 2 (diabetes mellitus, type 2) (HCC) ()      Essential hypertension, benign ()      Ventricular tachycardia (HCC) ()      Overview: Overview:       [facility] AICD      [facility]      Congestive heart failure (HCC) ()      Type 2 diabetes mellitus with stage 2 chronic kidney disease ([facility] [address] (7/28/2016)      Atrial paroxysmal tachycardia ([facility] [address] (6/7/2010)      AICD (automatic cardioverter/defibrillator) present ()      Ischemic cardiomyopathy ()      Cardiac arrest ([facility] [address] (7/19/2017)          Assessment & Plan    Patient s/p cardiac arrest and evidence of severe anoxic brain injury  He has no signs of higher level brain function but does have some reflexes and myoclonic jerking which is worsened with mild stimulation  Prognosis remains quite poor  -awaiting family decision for eventual withdrawal of care

## 2017-07-26 NOTE — PROGRESS NOTES
attended the interdisciplinary rounds for Pastora Wellington, who is a 74 y. o.,male. Patients Primary Language is: [place]. According to the patients EMR Muslim Affiliation is: [facility].     The reason the Patient came to the hospital is:   Patient Active Problem List    Diagnosis Date Noted    Cardiac arrest [facility]) 07/19/2017    Ischemic cardiomyopathy     MI (myocardial infarction) ([address]     Apical mural thrombus     CKD (chronic kidney disease) stage 3, GFR 30-59 ml/min     Diabetes ([address]     Systolic heart failure ([address]     AICD (automatic cardioverter/defibrillator) present     Type 2 diabetes mellitus with stage 2 chronic kidney disease ([address] 07/28/2016    SBO (small bowel obstruction) ([address] 01/21/2016    Type 2 diabetes mellitus without complication ([address] 80/42/5526    Chronic kidney disease, stage III (moderate) 08/14/2015    Hyperlipidemia     DM type 2 (diabetes mellitus, type 2) ([address]     Essential hypertension, benign     Ventricular tachycardia (HCC)     Congestive heart failure (HCC)     Other and unspecified angina pectoris     Automatic implantable cardioverter-defibrillator in situ 04/22/2014    Generalized ischemic myocardial dysfunction 06/07/2010    Hypertension 06/07/2010    Diabetes mellitus type 1A ([address] 06/07/2010    Atrial paroxysmal tachycardia ([address] 06/07/2010      Not able to assess the patient due to medical condition. No family at bedside. Plan:  Chaplains will continue to follow and will provide pastoral care on an as needed/requested basis.  recommends bedside caregivers page  on duty if patient shows signs of acute spiritual or emotional distress.     [address]  Board Certified [address] [facility]   [phone]

## 2017-07-26 NOTE — ROUTINE PROCESS
Adri with [address] [place] [address] called the unit to receive update on patient's status and neuro exam. [address] [place] [address] is signing off of case at this time.

## 2017-07-26 NOTE — PROGRESS NOTES
Problem: Ventilator Management  Goal: *Normal spontaneous ventilation  No SBT trial patient does not meet criteria due to neuro status. Continue full support, will continue to monitor.

## 2017-07-26 NOTE — DIABETES MGMT
Glycemic control: Pt discussed in interdisciplinary rounds. Blood glucose yesterday ranged from 108 to 244 mg/dL and pt required 12 units of correctional Lispro insulin. Pt is tolerating tube feeds of Glucerna 1.5 at 60 mL/hr. Continue inpatient monitoring and intervention.      Adán Hoang RD, CDE

## 2017-07-27 NOTE — PROGRESS NOTES
NUTRITION    Nutrition Screen      RECOMMENDATIONS / PLAN:     - Monitor for updates on plan of care   - Provide nutrition interventions consistent with plan of care. - Continue RD inpatient monitoring and evaluation. NUTRITION INTERVENTIONS & DIAGNOSIS:     [x]  Coordination of care: interdisciplinary rounds, discussed with RN     Nutrition Diagnosis: Inadequate oral intake related to inability to tolerate po as evidenced by NPO, comfort measures. ASSESSMENT:      7/27: pt discussed during rounds-extubated this AM and now on comfort measures  7/26: Tolerating feeding at goal. Sodium elevated, water flushes/IV fluid adjusted and discussed with MD.   7/25: Feeding resumed at 10 mL/hr at 12 pm yesterday and advancing gradually. Plan for compassionate extubation today. 7/24: Renal function worsening and poor UOP, Crea up to 4.14, continue IV fluid per Nephrology. Feedings up to 60 mL/hr then held this morning for 560 mL residual. Possible compassionate extubation tomorrow. 7/22: Tolerating feeding advancement. Sodium WNL, developed trace edema, minimal UOP.  7/21: Pt anticipated to reach goal rewarming temperature today, will start feedings once protocol completed per MD.   7/20: Pt intubated on hypothermia protocol- to start rewarming at 15:00 today. OGT to continuous suction. Diet:        Food Allergies: NKFA  Current Appetite:   [] Good     [] Fair     [] Poor     [x] Other: NPO  Appetite/meal intake prior to admission:   [] Good     [] Fair     [] Poor     [x] Other: unknown   Feeding Limitations:  [] Swallowing difficulty    [] Chewing difficulty    [x] Other:  Comfort measures  Current Meal Intake: No data found.     BM: 7/27, loose  Skin Integrity: WDL  Edema: 2+ generalized   Pertinent Medications: Reviewed    Recent Labs      07/27/17   0629  07/26/17   0400  07/25/17   0400   NA  151*  146*  143   K  5.3  4.5  4.4   CL  123*  117*  113*   CO2  22 21 22   GLU  180*  204*  244*   BUN  69*  69* 67*   CREA  3.77*  3.85*  4.13*   CA  8.4*  8.2*  7.9*   MG  2.8*  2.6  2.4   PHOS  3.7  3.5  4.3   ALB  1.7*  1.7*  1.7*   SGOT  26  44*  32   ALT  43  54  65*       Intake/Output Summary (Last 24 hours) at 07/27/17 1041  Last data filed at 07/27/17 0827   Gross per 24 hour   Intake          1624.93 ml   Output             2380 ml   Net          -755.07 ml       Anthropometrics:  Ht Readings from Last 1 Encounters:   07/19/17 6' 1\" (1.854 m)     Last 3 Recorded Weights in this Encounter    07/24/17 0200 07/25/17 0500 07/26/17 1029   Weight: 109 kg (240 lb 4.8 oz) 104.1 kg (229 lb 8 oz) 98.4 kg (217 lb)     Body mass index is 28.63 kg/(m^2). Weight History:   Weight Metrics 7/26/2017 12/29/2016 8/9/2016 7/28/2016 3/31/2016 3/17/2016 2/5/2016   Weight 217 lb 215 lb 219 lb 215 lb 218 lb 218 lb 228 lb   BMI 28.63 kg/m2 28.37 kg/m2 29.7 kg/m2 29.15 kg/m2 29.56 kg/m2 29.56 kg/m2 30.92 kg/m2        Admitting Diagnosis: Cardiac arrest [facility])  Pertinent PMHx: CKD, DM, HF    Education Needs:        [x] None identified  [] Identified - Not appropriate at this time  []  Identified and addressed - refer to education log  Learning Limitations:   [] None identified  [] Identified: intubated     Cultural, Temple & ethnic food preferences:  [x] None identified    [] Identified and addressed     ESTIMATED NUTRITION NEEDS:     Calories: 0061-7133 kcal (MSJ 1.2-1.3) based on  [x] Actual BW 96 kg     [] IBW   Protein: 77-96 gm (0.8-1.2 gm/kg) based on  [x] Actual BW      [] IBW   Fluid: 1 mL/kcal     MONITORING & EVALUATION:     Nutrition Goal(s):  1. Provide nutrition intervention as appropriate with goals of care for the next 5-7 days.  Outcome:  [x] Met/Ongoing    []  Not Met    [] New/Initial Goal     Monitoring:   [] EN tolerance   [] EN infusion   [] Supplement intake   [] GI symptoms/ability to tolerate po diet   [] Respiratory status   [x] Plan of care       Previous Recommendations (for follow-up assessments only): []   Implemented       [x]   Not Implemented (RD to address)     [] No Recommendation Made     Discharge Planning: pending plan of care  [x] Participated in care planning, discharge planning, & interdisciplinary rounds as appropriate      Ever Bryant, [address]   Pager: [phone]

## 2017-07-27 NOTE — PROGRESS NOTES
Problem: Ventilator Management  Goal: *Patient maintains clear airway/free of aspiration        Pt is comfort extubated, RN were at bedside placed on 2l NC

## 2017-07-27 NOTE — PROGRESS NOTES
I spoke with family at length regarding current status and their decision to withdrawal care. The decision has been made to make DNR and comfort measures with terminal extubation. They are aware that he may be able to breath independently and that he could protect his airway survive minutes or hours. He will, in the meantime, be placed on a versed drip and morphine drip.

## 2017-07-27 NOTE — PROCEDURES
[place]    Name:  Pura Vegas  MR#:  [id number]  :  1950  Account #:  [de-identified]  Date of Adm:  2017  Date of Service:  2017      EEG #: [facility] [id number]. CLINICAL: This is an apparently comatose, intubated EEG on this 74  year-old man being evaluated for continuous myoclonic jerking after a  cardiac arrest.    MEDICATIONS INCLUDE:  1. Keppra. 2. Vimpat. EEG REPORT: The predominant background consists of 30 to  35 microvolt, irregular but symmetrical, 4 to 5 Hz waves which  attenuate poorly. Noted diffusely with a bifrontal predominance is the  occurrence of spike complexes that are associated with some jerking  of the patient's body. The aforementioned jerking activity seems to  increase with stimulation to the patient. IMPRESSION: Abnormal electroencephalogram due to the presence  of:  1. Generalized slowing indicative of diffuse encephalopathic process. 2. Generalized myoclonic motor twitching with no associated  electroencephalographic correlate. This would indicate myoclonic  jerking.         MD MARLIN Solo / VLADIMIR  D:  2017   12:00  T:  2017   20:31  Job #:  [id number]

## 2017-07-27 NOTE — PROGRESS NOTES
Mr. Andra Martines is extubated and on Comfort measure only during this morning. He maintains on Versed drip. He is resting comfortably. No family at bedside.

## 2017-07-27 NOTE — INTERDISCIPLINARY ROUNDS
CRITICAL CARE INTERDISCIPLINARY ROUNDS      Patient Information:    Name:   Montez Briggs    Age:   74 y.o.     Admission Date:   7/19/2017    Readmit Risk Assessment Information:      Readmit Risk Tool Support Systems: Friends \ neighbors, Relationship with Primary Physician Group: Seen at least one time within the past 6 months    Surgery Date:      Day of Stay:     Expected Discharge Date:        Attending Provider:   Webster Litten, MD    Surgeon:        Consultant:       Primary Care Provider:   Adela Ortiz NP    Problem List:     Patient Active Problem List   Diagnosis Code    Hyperlipidemia E78.5    DM type 2 (diabetes mellitus, type 2) ([address] E11.9    Essential hypertension, benign I10    Ventricular tachycardia ([address] I47.2    Congestive heart failure ([address] I50.9    Other and unspecified angina pectoris I20.9    Chronic kidney disease, stage III (moderate) N18.3    Type 2 diabetes mellitus without complication ([address] U76.2    SBO (small bowel obstruction) ([address] K56.69    Type 2 diabetes mellitus with stage 2 chronic kidney disease ([address] E11.22, N18.2    Generalized ischemic myocardial dysfunction I25.5    Automatic implantable cardioverter-defibrillator in situ Z95.810    Hypertension I10    Diabetes mellitus type 1A ([address] E10.9    Atrial paroxysmal tachycardia (HCC) I47.1    CKD (chronic kidney disease) stage 3, GFR 30-59 ml/min N18.3    Diabetes ([address] B30.0    Systolic heart failure ([address] I50.20    AICD (automatic cardioverter/defibrillator) present Z95.810    Ischemic cardiomyopathy I25.5    MI (myocardial infarction) ([address] I21.3    Apical mural thrombus [id number]    Cardiac arrest ([address] I46.9       Principal Problem:  <principal problem not specified>    Procedure:       During rounds the following quality care indicators and evidence based practices were addressed :     DVT Prophylaxis, Pressure Injury Prevention, Pain Management, Sepsis resuscitation and management, Nutritional Status, Critical Care Interventions Airways, Drains and Lines and IHI Bundles: Central Line Bundle Followed  and [place] Bundle Followed           Acute MI/PCI:   Not applicable    Heart Failure:    Not applicable    Cardiac Surgery:  Not applicable    SCIP Measures for other Surgeries:   Not applicable    Pneumonia:    Appropriate Antibiotic Selection (ICU versus Non-ICU)    Stroke:  Patient's Personal Risk Factors for Stroke are:   hypertension, family history, hyperlipidemia or diabetes mellitus    NIH Stroke Score       Transfer Level of Care:  Ready for Transfer    The patient will require the following interventions based on the Readmission Risk Assessment:  Pharmacy evaluation and teaching, Care Management involvement for home health follow up for:  mobility and assistance with ADL's, Palliative Care evaluation and Spiritual Care evaluation      Discharge Management:  Palliative Care    Anticipated Discharge Date:  3      Interdisciplinary team rounds were held  with the following team membersCare Management, Diabetes Treatment Specialist, Nursing, Nutrition, Pastoral Care, Pharmacy, Physician and Clinical Coordinator and the  patient and healthcare POA (documentation required). Plan of care discussed. See clinical pathway and/or care plan for interventions and desired outcomes. Comfort care, morphine infusing, extubated. Medical bed ordered.

## 2017-07-27 NOTE — PROGRESS NOTES
Patient made comfort measures and now extubated on morphine and versed drips. He is protecting his airway thus far. If remains stable on current cares, then will look to transfer to med floor.

## 2017-07-28 NOTE — ROUTINE PROCESS
Pt arrived on unit via bed, ICU Nurse (Jacques Briscoe) at bedside for bedside report and PCA verification, pt noted without apical pulse, no respirations, S.  Christine Grover RN [facility] Nurse) arrived on unit and notified, charge nurse notified, Hospice Nurse calling MD and family, Jacques Briscoe removed PCA and returned to ICU

## 2017-07-28 NOTE — ROUTINE PROCESS
Bedside, Verbal and Written shift change report given to CHARLENE Coffey (oncoming nurse) by Nani Vance RN   (offgoing nurse). Report included the following information SBAR, Kardex, Procedure Summary, Intake/Output, MAR, Recent Results and Cardiac Rhythm Paced and arrythmias. Kaye Sterling

## 2017-07-28 NOTE — HOSPICE
Order received from Dr. Tom Hinojosa for inpatient hospice for a comfort bed. I spoke with the patients brother Jasbir Zavaleta regarding inpatient hospice and he is open to the patient having hospice here in the hospital.  He stated he and his siblings have made peace with making him a DNR, the patient's declining health and not doing any more aggressive measures. Chart reviewed by Dr. Valdemar Laboy Director who is in agreement with hospice comfort bed. The patient's brother stated he would be coming to the hospital this afternoon to visit with the patient and for signing consents. 1420 Informed the staff nurse Maday Rocha of the plan for a hospice comfort bed. Maday Rocha informed me the patient is scheduled to be transferred to another bed shortly.

## 2017-07-28 NOTE — PROGRESS NOTES
Not able to assess the patient due to medical condition. No family at bedside.     [address] [place] [address]    Board Certified [address] [place] [address]  [phone]

## 2017-07-28 NOTE — PROGRESS NOTES
Death [address] [facility]      Patient lying in bed, mouth open, eyes closed. Pupils fixed and equal bilaterally. No response to verbal or painful stimuli. No heart or lung sounds auscultated. No carotid or peripheral pulses.     Death officially pronounced at , 7/28/2017        Vanetta Aschoff, MD   PGY-1 [address] [facility]  07/28/17 4:56 PM

## 2017-07-28 NOTE — ROUTINE PROCESS
Pt transferred to [address] [place] [address]. While transitioning to a hospice bed pt stopped breathing. Pt had no pulse or respirations. Kacy Rosario RN contacted Dr. Kameron Laboy and patient's family.

## 2017-07-28 NOTE — HOSPICE
Met with the patient's brother Debbie Tolentino. Consent forms signed for hospice comfort bed admission.

## 2017-07-28 NOTE — PROGRESS NOTES
Hospitalist Progress Note    Patient: Danielle Pay Age: 74 y.o. : 1950 MR#: [id number] SSN: [id number]  Date/Time: 2017 11:59 AM    Subjective:     Patient remains on Comfort measure. He was extubated yesterday. Unresponsive but maintain respiration on his own. No myoclonus jerks. Continues to spike fever, and hypotension. Objective:   VS:   Visit Vitals    BP (!) 79/39    Pulse 74    Temp (!) 101.4 °F (38.6 °C)    Resp 15    Ht 6' 1\" (1.854 m)    Wt 98.4 kg (217 lb)    SpO2 96%    BMI 28.63 kg/m2      Tmax/24hrs: Temp (24hrs), Av.4 °F (38.6 °C), Min:101.4 °F (38.6 °C), Max:101.4 °F (38.6 °C)    Intake/Output Summary (Last 24 hours) at 17 1159  Last data filed at 17 0800   Gross per 24 hour   Intake            404.2 ml   Output              600 ml   Net           -195.8 ml       General:  He is comfortable with no distress. Unresponsive and comatose     Additional:   Current Facility-Administered Medications   Medication Dose Route Frequency    LORazepam (ATIVAN) injection 1 mg  1 mg IntraVENous Q15MIN PRN    LORazepam (INTENSOL) 2 mg/mL oral concentrate 1 mg  1 mg Oral Q1H PRN    PHENobarbital (LUMINAL) injection 65 mg  65 mg IntraVENous Q6H    glycopyrrolate (ROBINUL) injection 0.2 mg  0.2 mg IntraVENous Q4H PRN    scopolamine (TRANSDERM-SCOP) 1.5 mg  1.5 mg TransDERmal Q72H PRN    morphine injection 4 mg  4 mg IntraVENous Q15MIN PRN    morphine (PF)  mg/30 ml   IntraVENous TITRATE          Labs:    No results found for this or any previous visit (from the past 24 hour(s)). Assessment/Plan:     Mr Dillan Agrawal is terminally ill with recent cardiac arrest, pulseless VT. He has been extubated since yesterday morning for comfort measure only. He has been on versed drip and morphine gtt. Will transition him off versed and start on prn dose of IV ativan. Transition to medical floor for continuing comfort measure. Hospice has been consult previous. Signed By: Carey Headley MD     July 28, 2017 11:59 AM

## 2017-07-28 NOTE — HOSPICE
Was in the process of transitioning patient to a hospice comfort bed. When I arrived to the patient's new room assignment, I was informed by Amaris Yuan RN that the patient had . Pt without respirations or pulse. Notified Dr. Suraj Edmond that the patient had . Called patient's brother Chao Oscar who had left the building that the patient had . Offered him condolences. He has a  home in mind but wants to call and talk with the  before making a decision. Unable to complete admission orders due to patient expiring prior to completion.

## 2017-07-28 NOTE — H&P
Patient  within few minutes of transferred to hospice. Patient was not seen and no orders were placed.

## 2017-07-28 NOTE — ROUTINE PROCESS
Patient still hanging on. Around 03-04 patient had multiple arythymias that looks like he won't last the night but after an hour or so HR, BP RR and O2 Sat back to the baseline again.

## 2017-07-28 NOTE — ROUTINE PROCESS
TRANSFER - OUT REPORT:    Verbal report given to [facility] RN(name) on Mg Powell  being transferred to [address] [place] [address](unit) for routine progression of care       Report consisted of patients Situation, Background, Assessment and   Recommendations(SBAR). Information from the following report(s) SBAR, Kardex, ED Summary, Intake/Output, MAR, Accordion and Recent Results was reviewed with the receiving nurse. Lines:       Opportunity for questions and clarification was provided.       Patient transported with:   Monitor  Registered Nurse  Tech

## 2017-07-29 LAB
BACTERIA SPEC CULT: NORMAL
BACTERIA SPEC CULT: NORMAL
SERVICE CMNT-IMP: NORMAL
SERVICE CMNT-IMP: NORMAL

## 2017-07-29 NOTE — PROGRESS NOTES
2100-Family visitation completed. Post-mortem care performed. [place] [facility]

## 2017-07-29 NOTE — PROGRESS NOTES
responded to Death of  Jacky Kahn, who was a 74 y. o.,male,     The  provided the following Interventions:  Provided crisis pastoral care, pastoral support and grief interventions. Spent time hearing about the wonderful life and work and service and ministry of [facility], by dorcas Chavarria. Prayed with Jean Deon and other family member. Affirmed decisions of patient being moved to Hospice. Affirmed that Patient was not going to get better, even though there was disagreement with family members on his care at the end. Offered prayers on behalf of the patient. Chart reviewed. Plan:  Chaplains will continue to follow and will provide pastoral care on an as needed/requested basis and grief support for the family. Family [facility] in [place].        [address] [facility]  Spiritual Care   [phone]

## 2017-07-29 NOTE — DISCHARGE SUMMARY
Discharge Summary    Patient: Javier Laughlin               Sex: male          DOA: 7/19/2017         YOB: 1950      Age:  74 y.o.        LOS:  LOS: 9 days                Admit Date: 7/19/2017    Discharge Date: 7/28/2017    Primary care physician: Anabelle Cates NP    Discharge Diagnoses:    1) Cardiac arrest, Pulseless Ventricular tachycardia  2) Acute hypoxic respiratory failure, ventilation dependent, compassionate extubated  3) Anoxic brain injury with comatose state  4) Persistent myoclonus  5)  NUSRAT on CKD III  6)  Liver shock with transaminitis   7)  Pneumonia with staph, enterobacter, proteus   8)  Ischemic cardiomyopathy   9)  Troponin elevation due to VT and hypoxia   10)  Hospice and Comfort measure only       Discharge Condition: Critical  Disposition: Inpatient Hospice   CODE STATUS: DNR/DNI, Comfort measure only    Hospital Course: In brief, Mr Fransisco Olmedo experienced AICD shock while working in his yard. His brother drove him to the hospital after he started   complaining of chest pain and AICD shocks. Prior to arrival, Mr Fransisco Olmedo sustained LOC for unknown duration. On in arrival   to the ER, he was found to be in cardiac arrest with pulseless VT. He was resuscitated and place on cooling protocol and   admitted into the ICU. He had a prolonged hospitalization with very poor prognosis. Cardiac: Post Code Blue, he regained ROSC and was placed on cooling protocol. Cardiology followed, and started him on   amiodarone. AICD was interrogated with normal function. He also had troponin leak due to VT. Neurology: Post rewarming, he was found to be persistently unresponsive. He had myoclonus jerk which was thought to be seizure,   keppra was given. EEG did no show seizure but slowing waves. Neurology suspected he had anoxic brain injury due to his cardiac   arrest and hypoxia. He had poor prognosis with no meaningful response. Respiratory: He was intubated during code blue.  During his ICU stay, he had fever with sputum culture initially grew Staph, Enterococcus,   proteus. He was started on antibiotic empiric antibiotics and continue on bronchial toiletry. GI: he sustained liver shock on presentation with increased INR. This resolved   : he also sustained NUSRAT, Nephrology evaluated him with no plan for HD. Overall he has very poor prognosis. His brother Aj Gonzáles was constantly updated of his condition. His family finally accepted   comfort measure and him extubated on 2017. Mr Dillan Agrawal remains in the ICU due need of Versed drip. He was eventually on morphine drip with Ativan prn and planned for  transitioned to inpatient Hospice/Comfort Care. Family agreed to transfer him on 2017. He  soon after he was transferred out of the ICU.      Time of death: 4:39 PM on 2017          Consults:   [address] [place] [address]  Cardiology  Neurology  Nephrology    Lab/Data Review:  Labs: Results:       Chemistry Recent Labs      17   0629  17   0400   GLU  180*  204*   NA  151*  146*   K  5.3  4.5   CL  123*  117*   CO2  22  21   BUN  69*  69*   CREA  3.77*  3.85*   CA  8.4*  8.2*   AGAP  6  8   BUCR  18  18   AP  67  69   TP  6.6  6.2*   ALB  1.7*  1.7*   GLOB  4.9*  4.5*   AGRAT  0.3*  0.4*      CBC w/Diff Recent Labs      17   0629  17   0400   WBC  11.1  10.0   RBC  2.70*  2.66*   HGB  7.2*  7.3*   HCT  23.1*  22.1*   PLT  201  185   GRANS  76*  78*   LYMPH  10*  9*   EOS  2  3      Coagulation Recent Labs      17   0629  17   0400   PTP  17.3*  19.1*   INR  1.5*  1.7*       Iron/Ferritin    BNP    Cardiac Enzymes    Liver Enzymes Recent Labs      17   0629   TP  6.6   ALB  1.7*   AP  67   SGOT  26      Thyroid Studies      All Micro Results     Procedure Component Value Units Date/Time    CULTURE, BLOOD [[id number]] Collected:  17 1755    Order Status:  Completed Specimen:  Blood from Blood Updated:  17 0736     Special Requests: NO SPECIAL REQUESTS        Culture result: NO GROWTH 5 DAYS       CULTURE, BLOOD [[id number]] Collected:  07/23/17 1915    Order Status:  Completed Specimen:  Blood from Blood Updated:  07/28/17 0736     Special Requests: NO SPECIAL REQUESTS        Culture result: NO GROWTH 5 DAYS       CULTURE, BLOOD [[id number]] Collected:  07/22/17 1557    Order Status:  Completed Specimen:  Blood from Blood Updated:  07/28/17 0736     Special Requests: NO SPECIAL REQUESTS        Culture result: NO GROWTH 6 DAYS       CULTURE, BLOOD [[id number]] Collected:  07/22/17 1600    Order Status:  Completed Specimen:  Blood from Blood Updated:  07/28/17 0736     Special Requests: NO SPECIAL REQUESTS        Culture result: NO GROWTH 6 DAYS       CULTURE, RESPIRATORY/SPUTUM/BRONCH W GRAM STAIN [[id number]]  (Abnormal)  (Susceptibility) Collected:  07/23/17 1755    Order Status:  Completed Specimen:  Sputum from Sputum,ET Suction Updated:  07/26/17 0923     Special Requests: NO SPECIAL REQUESTS        GRAM STAIN >25 WBC/lpf         <10 EPI/lpf         MUCUS PRESENT                 FEW GRAM POSITIVE COCCI IN PAIRS IN GROUPS      RARE GRAM POSITIVE RODS         RARE GRAM NEGATIVE RODS        Culture result:         MODERATE STAPHYLOCOCCUS AUREUS (A)              FEW ENTEROBACTER AEROGENES (A)      FEW PROTEUS MIRABILIS (A)                 FEW NORMAL RESPIRATORY RENARD    CULTURE, RESPIRATORY/SPUTUM/BRONCH [employer]inda [employer] STAIN [[id number]]  (Abnormal)  (Susceptibility) Collected:  07/22/17 1615    Order Status:  Completed Specimen:  Sputum from Sputum,ET Suction Updated:  07/25/17 0903     Special Requests: NO SPECIAL REQUESTS        GRAM STAIN MODERATE WBC'S                 MODERATE GRAM POSITIVE COCCI IN PAIRS IN GROUPS      FEW GRAM NEGATIVE RODS        Culture result:         MANY STAPHYLOCOCCUS AUREUS (A)              MODERATE ENTEROBACTER AEROGENES (A)              MODERATE PROTEUS MIRABILIS (A)              FEW NORMAL RESPIRATORY RENARD    CULTURE, BLOOD [[id number]] Collected:  07/19/17 1530    Order Status:  Completed Specimen:  Blood from Blood Updated:  07/25/17 0753     Special Requests: NO SPECIAL REQUESTS        Culture result: NO GROWTH 6 DAYS       CULTURE, URINE [[id number]] Collected:  07/22/17 1735    Order Status:  Completed Specimen:  Urine from [place] Specimen Updated:  07/24/17 0858     Special Requests: NO SPECIAL REQUESTS        Culture result: NO GROWTH 2 DAYS       CULTURE, URINE [[id number]] Collected:  07/20/17 0900    Order Status:  Completed Specimen:  [place] Specimen Updated:  07/22/17 0838     Special Requests: NO SPECIAL REQUESTS        Culture result: NO GROWTH 2 DAYS       CULTURE, BLOOD [[id number]]  (Abnormal)  (Susceptibility) Collected:  07/19/17 1530    Order Status:  Completed Specimen:  Blood from Blood Updated:  07/22/17 0708     Special Requests: NONE        GRAM STAIN         AEROBIC AND ANAEROBIC BOTTLES GRAM POSITIVE COCCI              SMEAR CALLED TO AND CORRECTLY REPEATED BY: Donn [address] RN CCU 1130 7/20 TO B     Culture result:         ANAEROBIC BOTTLE STAPHYLOCOCCUS EPIDERMIDIS (A)              AEROBIC BOTTLE STAPHYLOCOCCUS LUGDUNENSIS (A)                Medications at discharge  including reasons for change and indications for new ones:   Discharge Medication List as of 7/28/2017  4:04 PM      START taking these medications    Details   glycopyrrolate (ROBINUL) 0.2 mg/mL injection 1 mL by IntraVENous route every four (4) hours as needed. Secretion, Print, Disp-1 Vial, R-0      LORazepam (ATIVAN) 2 mg/mL injection 0.5 mL by IntraVENous route every fifteen (15) minutes as needed. Max Daily Amount: 96 mg. For Anxiety, Sedation, Print, Disp-1 Vial, R-0      LORazepam (INTENSOL) 2 mg/mL concentrated solution Take 0.5 mL by mouth every one (1) hour as needed. Max Daily Amount: 24 mg. For Anxiety, Print, Disp-1 Bottle, R-0      morphine 4 mg/mL injection 1 mL by IntraVENous route every fifteen (15) minutes as needed.  Max Daily Amount: 384 mg. For pain and Dyspnea, Print, Disp-20 mL, R-0      PHENobarbital (LUMINAL) 65 mg/mL injection 0.46 mL by IntraVENous route every six (6) hours. Max Daily Amount: 120 mg., Print, Disp-1 Vial, R-0      scopolamine (TRANSDERM-SCOP) 1.5 mg (1 mg over 3 days) pt3d 1 Patch by TransDERmal route every seventy-two (72) hours as needed for Other (Secretions). , Print, Disp-2 Patch, R-0         STOP taking these medications       mexiletine (MEXITIL) 150 mg capsule Comments:   Reason for Stopping:         spironolactone (ALDACTONE) 25 mg tablet Comments:   Reason for Stopping:         warfarin (COUMADIN) 5 mg tablet Comments:   Reason for Stopping:         amiodarone (CORDARONE) 200 mg tablet Comments:   Reason for Stopping:         amLODIPine (NORVASC) 10 mg tablet Comments:   Reason for Stopping:         enalapril (VASOTEC) 20 mg tablet Comments:   Reason for Stopping:         glipiZIDE SR (GLUCOTROL XL) 10 mg CR tablet Comments:   Reason for Stopping:         metoprolol succinate (TOPROL XL) 200 mg XL tablet Comments:   Reason for Stopping:         aspirin 81 mg chewable tablet Comments:   Reason for Stopping:         sildenafil citrate (VIAGRA) 100 mg tablet Comments:   Reason for Stopping:         ferrous sulfate 325 mg (65 mg iron) tablet Comments:   Reason for Stopping:         rosuvastatin (CRESTOR) 20 mg tablet Comments:   Reason for Stopping:         insulin NPH (NOVOLIN N, HUMULIN N) 100 unit/mL injection Comments:   Reason for Stopping:                          Fabian Malik MD  7/28/2017  10:22 PM

## 2020-12-18 NOTE — CONSULTS
Called and notified pt that no fractures just swelling.  Gave him name and number to call for Dr. Tommie Manning Consult Note  Consult requested by: Dr. Isaac Galeas is a 74 y.o. male [address] who is being seen on consult for acute renal failure. Chief Complaint   Patient presents with    Cardiac arrest     Impression & Plan:   IMPRESSION:   Acute kidney injury , suspect ATN for hypotension, sepsis , rising creatinine, poor urine output,   Metabolic acidosis , mild renal failure   Respiratory failure, intubated, tachypnea,   S/p cardiac arrest  Anoxic brain injury, severe per neurology colleague   PLAN:    Mr. Annalisa Lao has MODS, severe brain injury , severe cardiomyopathy , at present respi failure requiring intubation. Base on current situation , I doubt there will be meaningful recovery from renal stand point and in general.    He has no urgent indication for dialysis, and he is not good candidate for dialysis in my opinion. Also hypotension would pose more risk for dialysis support. Discussed with family at bedside, discussed brifely about goal of care. Family will have meeting today. Will follow very closely. Continue iv fluid. Adjust all meds per current renal function status. Thank you very much for allowing me to participate in the care of this patient. We will continue to monitor with you and make recommendations as needed.     Discussed with ICU team, Dr. Naldo Alvarenga. Admission diagnosis: cardiac arrest  Patient is unable to provide any history , major part of history obtained by chart review and discussion with primary team.     HPI:  72y M with PMH HTN, CKD, Ischemic cardiomyopathy, s/p AICD placement, was presented to ER on 19th July after cardiac arrest.   Patient was  recussicated in ER, intubated, and started on hypothermia protocol. He was admitted to ICU for further care. He has been receiving antibiotic for sepsis and bacteremia. His neurology work up shows severe anoxic brain injury. His code status is active , as  family request continue aggressive care.   His urine output is dropping over last 48hrs and his creatinine has been rising, nephrology service consulted for further co management. His creatinine was higher on admission, he received aggressive IV fluid , had good urine output initially. His urine output is dropping in last 24-48hrs, had episode of hypotension in icu. During my evaluation he remain intubated, has poor urine output, not on any vasopressure. Past Medical History:   Diagnosis Date    AICD (automatic cardioverter/defibrillator) present     Aneurysm of left ventricle of heart     with vt    Apical mural thrombus (HCC)     CKD (chronic kidney disease) stage 3, GFR 30-59 ml/min     Diabetes ([address]     IDDM    Essential hypertension, benign     Ischemic cardiomyopathy     MI (myocardial infarction) ([address]     Mixed hyperlipidemia     Other and unspecified angina pectoris ([address]     Paroxysmal ventricular tachycardia ([address]     SBO (small bowel obstruction) ([address]     Systolic heart failure ([address]       Past Surgical History:   Procedure Laterality Date    HX CATARACT REMOVAL      HX COLECTOMY  7/2003    Laparoscopic; villous adenoma    HX COLONOSCOPY  12/2015; 2010    diverticula; h/o polyps    HX HEMORRHOIDECTOMY      HX HERNIA REPAIR  90/3175    DEISY/Umbilical Hernia Repair    HX PACEMAKER  1999    ICD, 1999 with most recent pulse generator replacement in 2010       Social History     Social History    Marital status: SINGLE     Spouse name: N/A    Number of children: N/A    Years of education: N/A     Occupational History    Not on file.      Social History Main Topics    Smoking status: Former Smoker     Quit date: 1/1/1985    Smokeless tobacco: Never Used    Alcohol use No    Drug use: No    Sexual activity: Not Currently     Partners: Female     Other Topics Concern    Not on file     Social History Narrative       Family History   Problem Relation Age of Onset    Heart Disease Mother     Cancer Father      lung cancer    Cancer Sister     Heart Disease Brother      No Known Allergies     Home Medications:     Prior to Admission Medications   Prescriptions Last Dose Informant Patient Reported? Taking? amLODIPine (NORVASC) 10 mg tablet Unknown at Unknown time  Yes No   Sig: Take 10 mg by mouth daily. amiodarone (CORDARONE) 200 mg tablet Unknown at Unknown time  Yes No   Sig: Take 100 mg by mouth two (2) times a day. aspirin 81 mg chewable tablet Unknown at Unknown time  Yes No   Sig: Take 81 mg by mouth daily. enalapril (VASOTEC) 20 mg tablet Unknown at Unknown time  Yes No   Sig: Take 40 mg by mouth daily. ferrous sulfate 325 mg (65 mg iron) tablet Unknown at Unknown time  No No   Sig: Take 1 Tab by mouth Daily (before breakfast). glipiZIDE SR (GLUCOTROL XL) 10 mg CR tablet Unknown at Unknown time  Yes No   Sig: Take 10 mg by mouth daily. insulin NPH (NOVOLIN N, HUMULIN N) 100 unit/mL injection Unknown at Unknown time  No No   Si units qam; 25 qpm   Patient taking differently: 25 units qam; 25 qpm   metoprolol succinate (TOPROL XL) 200 mg XL tablet Unknown at Unknown time  Yes No   Sig: Take 200 mg by mouth daily. mexiletine (MEXITIL) 150 mg capsule Unknown at Unknown time  Yes No   Sig: Take 1 Cap by mouth three (3) times daily. 16, QTY #90, Thirty Day Supply by Dr. Ida Mccabe   rosuvastatin (CRESTOR) 20 mg tablet Unknown at Unknown time  No No   Sig: Take 1 Tab by mouth nightly. sildenafil citrate (VIAGRA) 100 mg tablet Unknown at Unknown time  No No   Si/2 or 1 po 1 hr prior to sex   spironolactone (ALDACTONE) 25 mg tablet Unknown at Unknown time  Yes No   Sig: Take 1 Tab by mouth daily. 16, QTY #30, [de-identified] Day Supply by Dr. Lia Hollins. Herre. warfarin (COUMADIN) 5 mg tablet Unknown at Unknown time  Yes No   Sig: Take 5 mg by mouth daily.       Facility-Administered Medications: None       Current Facility-Administered Medications   Medication Dose Route Frequency    cefTRIAXone (ROCEPHIN) 1 g in 0.9% sodium chloride (MBP/ADV) 50 mL MBP  1 g IntraVENous Q24H    ELECTROLYTE REPLACEMENT PROTOCOL  1 Each Other PRN    ELECTROLYTE REPLACEMENT PROTOCOL  1 Each Other PRN    ELECTROLYTE REPLACEMENT PROTOCOL  1 Each Other PRN    acetaminophen (TYLENOL) solution 1,000 mg  1,000 mg Per NG tube Q6H PRN    clindamycin phosphate (CLEOCIN) 600 mg in 0.9% sodium chloride (MBP/ADV) 100 mL ADV  600 mg IntraVENous Q8H    LORazepam (ATIVAN) injection 2-4 mg  2-4 mg IntraVENous Q2H PRN    insulin lispro (HUMALOG) injection   SubCUTAneous Q6H    midazolam in normal saline (VERSED) 1 mg/mL infusion  0-10 mg/hr IntraVENous TITRATE    levETIRAcetam (KEPPRA) 500 mg in 100 ml IVPB  500 mg IntraVENous Q12H    propofol (DIPRIVAN) infusion  5-50 mcg/kg/min IntraVENous TITRATE    fentaNYL citrate (PF) injection  mcg   mcg IntraVENous Q4H PRN    amiodarone (NEXTERONE) 360 mg in dextrose 200 mL (1.8 mg/mL) infusion  0.5 mg/min IntraVENous TITRATE    0.9% sodium chloride infusion  50 mL/hr IntraVENous CONTINUOUS    hydroxypropyl methylcellulose (GONAK) 2.5 % ophthalmic solution 1 Drop  1 Drop Both Eyes Q12H    glucose chewable tablet 16 g  4 Tab Oral PRN    glucagon (GLUCAGEN) injection 1 mg  1 mg IntraMUSCular PRN    dextrose (D50W) injection syrg 12.5-25 g  25-50 mL IntraVENous PRN    pantoprazole (PROTONIX) 40 mg in sodium chloride 0.9 % 10 mL injection  40 mg IntraVENous DAILY       Review of Systems:   Unable to provide   Data Review:    Labs: Results:       Chemistry Recent Labs      07/23/17   0439  07/22/17   0400  07/21/17   0620   GLU  213*  193*  196*   NA  139  141  139   K  4.8  4.4  4.6   CL  108  110*  107   CO2  20*  21  21   BUN  53*  33*  21*   CREA  4.26*  2.73*  2.11*   CA  7.8*  7.6*  8.1*   AGAP  11  10  11   BUCR  12  12  10*   AP  63  72  83   TP  6.1*  6.1*  6.6   ALB  2.1*  2.4*  2.9*   GLOB  4.0  3.7  3.7   AGRAT  0.5*  0.6*  0.8      CBC w/Diff Recent Labs 07/23/17 0439 07/22/17   0400  07/21/17   0620   WBC  8.5  11.6  13.9*   RBC  3.17*  3.53*  3.95*   HGB  8.7*  9.6*  10.6*   HCT  26.1*  29.7*  32.6*   PLT  173  192  232   GRANS  33*  80*  91*   LYMPH  9*  2*  4*   EOS  0  0  0      Coagulation Recent Labs      07/23/17 0439 07/22/17   0400  07/21/17   0620  07/20/17   2200   PTP  17.8*  17.1*  47.4*  43.5*   INR  1.5*  1.5*  5.6*  5.0*   APTT   --    --   79.0*  71.7*       Iron/Ferritin No results for input(s): IRON in the last 72 hours. No lab exists for component: TIBCCALC   BNP No results for input(s): BNPP in the last 72 hours. Cardiac Enzymes No results for input(s): CPK, CKND1, LAMONT in the last 72 hours. No lab exists for component: CKRMB, TROIP   Liver Enzymes Recent Labs      07/23/17 0439   TP  6.1*   ALB  2.1*   AP  63   SGOT  53*      Thyroid Studies No results found for: T4, T3U, TSH, TSHEXT      EKG: wide qrs complex on admission.      Physical Assessment:     Visit Vitals    BP 99/47 (BP 1 Location: Left arm, BP Patient Position: At rest)    Pulse 60    Temp 99.4 °F (37.4 °C)    Resp 30    Ht 6' 1\" (1.854 m)    Wt 107 kg (235 lb 12.8 oz)    SpO2 98%    BMI 31.11 kg/m2     Weight change:     Intake/Output Summary (Last 24 hours) at 07/23/17 1106  Last data filed at 07/23/17 0600   Gross per 24 hour   Intake          3575.07 ml   Output              120 ml   Net          3455.07 ml     Physical Exam:   General: intubated, tachypnea    HEENT sclera anicteric,   CVS: S1S2 heard,  no rub  RS: + air entry b/l,   Abd: Soft, Non tender,  Neuro: on sedation   Extrm: + edema, no cyanosis, clubbing   Skin: no visible  Rash  Musculoskeletal: No gross joints or bone deformities     Procedures/imaging: see electronic medical records for all procedures, Xrays and details which were not copied into this note but were reviewed prior to creation of Tamara Lloyd MD  July 23, 2017  [facility] Nephrology  Office [phone]

## 2023-02-19 NOTE — H&P
History & Physical    Patient: Clara Sol MRN: [id number]  CSN: [id number]    YOB: 1950  Age: 74 y.o. Sex: male      DOA: 7/19/2017       HPI:     Clara Sol is a 74 y.o. male with a history of ischemic cardiomyopathy, CABG and ICD was reportedly speaking with his brother this morning when his defibrillator went off. When EMS arrival patient was found pulseless. ACLS measures continued through the emergency department. Patient was recovered to V. Tach, ROSC achieved. Patient intubated to ventilator. Dr Emelina Severino agrees to ICU admission.       Past Medical History:   Diagnosis Date    AICD (automatic cardioverter/defibrillator) present     Aneurysm of left ventricle of heart     with vt    Apical mural thrombus (HCC)     CKD (chronic kidney disease) stage 3, GFR 30-59 ml/min     Diabetes ([address]     IDDM    Essential hypertension, benign     Ischemic cardiomyopathy     MI (myocardial infarction) ([address]     Mixed hyperlipidemia     Other and unspecified angina pectoris ([address]     Paroxysmal ventricular tachycardia ([address]     SBO (small bowel obstruction) ([address]     Systolic heart failure ([address]        Past Surgical History:   Procedure Laterality Date    HX CATARACT REMOVAL      HX COLECTOMY  7/2003    Laparoscopic; villous adenoma    HX COLONOSCOPY  12/2015; 2010    diverticula; h/o polyps    HX HEMORRHOIDECTOMY      HX HERNIA REPAIR  53/8562    DEISY/Umbilical Hernia Repair    HX PACEMAKER  1999    ICD, 1999 with most recent pulse generator replacement in 2010       Family History   Problem Relation Age of Onset    Heart Disease Mother     Cancer Father      lung cancer    Cancer Sister     Heart Disease Brother        Social History     Social History    Marital status: SINGLE     Spouse name: N/A    Number of children: N/A    Years of education: N/A     Social History Main Topics    Smoking status: Former Smoker     Quit date: 1/1/1985    Smokeless tobacco: Never Used  Alcohol use No    Drug use: No    Sexual activity: Not Currently     Partners: Female     Other Topics Concern    Not on file     Social History Narrative       Prior to Admission medications    Medication Sig Start Date End Date Taking? Authorizing Provider   mexiletine (MEXITIL) 150 mg capsule Take 1 Cap by mouth three (3) times daily. 12/6/16, QTY #90, Thirty Day Supply by Dr. Bola Lima 12/6/16   Wally Merritt MD   spironolactone (ALDACTONE) 25 mg tablet Take 1 Tab by mouth daily. 12/9/16, QTY #30, [de-identified] Day Supply by Dr. Zohra Granado. Herre. 12/9/16   Wally Merritt MD   warfarin (COUMADIN) 5 mg tablet Take 5 mg by mouth daily. Wally Merritt MD   amiodarone (CORDARONE) 200 mg tablet Take 100 mg by mouth two (2) times a day. 6/24/16   Wally Merritt MD   amLODIPine (NORVASC) 10 mg tablet Take 10 mg by mouth daily. Wally Merritt MD   enalapril (VASOTEC) 20 mg tablet Take 40 mg by mouth daily. Wally Merritt MD   glipiZIDE SR (GLUCOTROL XL) 10 mg CR tablet Take 10 mg by mouth daily. Wally Merritt MD   metoprolol succinate (TOPROL XL) 200 mg XL tablet Take 200 mg by mouth daily. Wally Merritt MD   aspirin 81 mg chewable tablet Take 81 mg by mouth daily. Wally Merritt MD   sildenafil citrate (VIAGRA) 100 mg tablet 1/2 or 1 po 1 hr prior to sex 8/9/16   Cyndi Moss MD   ferrous sulfate 325 mg (65 mg iron) tablet Take 1 Tab by mouth Daily (before breakfast). 7/21/16   Cyndi Moss MD   rosuvastatin (CRESTOR) 20 mg tablet Take 1 Tab by mouth nightly.  6/23/16   Cyndi Moss MD   insulin NPH (NOVOLIN N, HUMULIN N) 100 unit/mL injection 35 units qam; 25 qpm  Patient taking differently: 25 units qam; 25 qpm 12/11/15   Cyndi Moss MD       No Known Allergies           Physical Exam:      Visit Vitals    BP 91/62    Pulse 60    Temp (!) 91.6 °F (33.1 °C)    Resp 18    Ht 6' 1\" (1.854 m)    Wt 97.1 kg (214 lb)    SpO2 100%    BMI 28.23 kg/m2       Physical Exam:  GENERAL: unresponsive  HEENT ARPIT, conjunctiva clear, no JVD  Chest  heart rate regular 60              Lungs clear  Abdomen soft, bowel sounds positive  Extremities no edema positive pulses                Current Facility-Administered Medications   Medication Dose Route Frequency    amiodarone (NEXTERONE) 360 mg in dextrose 200 mL (1.8 mg/mL) infusion  0.5-1 mg/min IntraVENous TITRATE    propofol (DIPRIVAN) infusion  5-50 mcg/kg/min IntraVENous TITRATE    0.9% sodium chloride infusion  50 mL/hr IntraVENous CONTINUOUS    hydroxypropyl methylcellulose (GONAK) 2.5 % ophthalmic solution 1 Drop  1 Drop Both Eyes Q12H    LORazepam (ATIVAN) injection 2 mg  2 mg IntraVENous Q2H PRN    insulin regular (NOVOLIN R, HUMULIN R) 100 Units in 0.9% sodium chloride 100 mL infusion  0-50 Units/hr IntraVENous TITRATE    glucose chewable tablet 16 g  4 Tab Oral PRN    glucagon (GLUCAGEN) injection 1 mg  1 mg IntraMUSCular PRN    dextrose (D50W) injection syrg 12.5-25 g  25-50 mL IntraVENous PRN    [START ON 7/20/2017] pantoprazole (PROTONIX) 40 mg in sodium chloride 0.9 % 10 mL injection  40 mg IntraVENous DAILY    vecuronium (NORCURON) 100 mg in 0.9% sodium chloride 100 mL infusion  0.8-1.2 mcg/kg/min IntraVENous TITRATE    [START ON 7/20/2017] levETIRAcetam (KEPPRA) 500 mg in 100 ml IVPB  500 mg IntraVENous Q12H     Lab/Data Review:  Labs: Results:       Chemistry Recent Labs      07/19/17 2119 07/19/17   1530  07/19/17   0951   GLU  255*  368*  258*   NA  141  138  139   K  3.6  4.4  3.8   CL  109*  108  106   CO2  17*  17*  21   BUN  21*  22*  21*   CREA  2.15*  2.14*  2.36*   CA  8.9  8.4*  8.4*   AGAP  15  13  12   BUCR  10*  10*  9*   AP  97  101  92   TP  7.9  7.3  7.5   ALB  3.4  3.6  3.5   GLOB  4.5*  3.7  4.0   AGRAT  0.8  1.0  0.9      CBC w/Diff Recent Labs      07/19/17 2119 07/19/17   1530  07/19/17   0951   WBC  14.4*  15.6*  9.6   RBC  4.38*  4.29*  4.38*   HGB  12.0*  11.6*  11.8*   HCT  36.0  35.8*  37.3   PLT  241  138 657 GRANS  91*  90*  49   LYMPH  3*  3*  32   EOS  0  0  3      Coagulation Recent Labs      07/19/17 2119 07/19/17 1530   PTP  31.6*  28.0*   INR  3.3*  2.8*   APTT  42.1*  37.6*       Iron/Ferritin No results for input(s): IRON in the last 72 hours. No lab exists for component: TIBCCALC   BNP No results for input(s): BNPP in the last 72 hours. Cardiac Enzymes Recent Labs      07/19/17 2119 07/19/17 1530   CPK  683*  509*   CKND1  6.0*  5.8*      Liver Enzymes Recent Labs      07/19/17 2119   TP  7.9   ALB  3.4   AP  97   SGOT  892*      Thyroid Studies No results found for: T4, T3U, TSH, TSHEXT       All Micro Results     Procedure Component Value Units Date/Time    CULTURE, BLOOD [[id number]] Collected:  07/19/17 1530    Order Status:  Completed Specimen:  Blood from Blood Updated:  07/19/17 1631    CULTURE, BLOOD [[id number]] Collected:  07/19/17 1530    Order Status:  Completed Specimen:  Blood from Blood Updated:  07/19/17 1631          Imaging Reviewed:  CT head:Mild probably ischemic white matter disease. Otherwise unremarkable    Chest: Endotracheal tube terminates 4 cm proximal to the susana. The esophagogastric  tube enters the stomach and extends beyond off the film. No change in the right  atrial nor the right ventricular lead of the left upper chest AICD. The lungs are hypoinflated. Pulmonary vascularity is normal. The costophrenic angles are  sharp. The heart is mild to moderately enlarged. Sternal sutures and clips of CABG are demonstrated. .       Assessment:   Active Problems:    Cardiac arrest ([facility] (7/19/2017)    VDRF  Downtime greater than 6 minutes with possible global anoxia  Plan:    intensivist support appreciated  ET to ventilator Sedation per protocol  Hypothermia protocol this time  Cardiology follow appreciated  Continue full support      Rachelle December, DO 7/19/2017, 10:44 PM  [phone] Yes